# Patient Record
Sex: MALE | Race: WHITE | NOT HISPANIC OR LATINO | Employment: OTHER | URBAN - METROPOLITAN AREA
[De-identification: names, ages, dates, MRNs, and addresses within clinical notes are randomized per-mention and may not be internally consistent; named-entity substitution may affect disease eponyms.]

---

## 2017-01-09 ENCOUNTER — TRANSCRIBE ORDERS (OUTPATIENT)
Dept: ADMINISTRATIVE | Facility: HOSPITAL | Age: 47
End: 2017-01-09

## 2017-01-09 ENCOUNTER — APPOINTMENT (OUTPATIENT)
Dept: LAB | Facility: MEDICAL CENTER | Age: 47
End: 2017-01-09
Payer: MEDICARE

## 2017-01-09 DIAGNOSIS — Z79.891 ENCOUNTER FOR LONG-TERM METHADONE USE: Primary | ICD-10-CM

## 2017-01-09 DIAGNOSIS — Z79.891 ENCOUNTER FOR LONG-TERM METHADONE USE: ICD-10-CM

## 2017-01-09 LAB
ALBUMIN SERPL BCP-MCNC: 4.1 G/DL (ref 3.5–5)
ALP SERPL-CCNC: 129 U/L (ref 46–116)
ALT SERPL W P-5'-P-CCNC: 26 U/L (ref 12–78)
ANION GAP SERPL CALCULATED.3IONS-SCNC: 7 MMOL/L (ref 4–13)
AST SERPL W P-5'-P-CCNC: 11 U/L (ref 5–45)
BILIRUB SERPL-MCNC: 0.4 MG/DL (ref 0.2–1)
BUN SERPL-MCNC: 19 MG/DL (ref 5–25)
CALCIUM SERPL-MCNC: 9.8 MG/DL (ref 8.3–10.1)
CHLORIDE SERPL-SCNC: 100 MMOL/L (ref 100–108)
CO2 SERPL-SCNC: 34 MMOL/L (ref 21–32)
CREAT SERPL-MCNC: 1.44 MG/DL (ref 0.6–1.3)
ERYTHROCYTE [DISTWIDTH] IN BLOOD BY AUTOMATED COUNT: 13.1 % (ref 11.6–15.1)
GFR SERPL CREATININE-BSD FRML MDRD: 52.4 ML/MIN/1.73SQ M
GLUCOSE SERPL-MCNC: 240 MG/DL (ref 65–140)
HCT VFR BLD AUTO: 51.3 % (ref 36.5–49.3)
HGB BLD-MCNC: 16.5 G/DL (ref 12–17)
MCH RBC QN AUTO: 28.7 PG (ref 26.8–34.3)
MCHC RBC AUTO-ENTMCNC: 32.2 G/DL (ref 31.4–37.4)
MCV RBC AUTO: 89 FL (ref 82–98)
PLATELET # BLD AUTO: 223 THOUSANDS/UL (ref 149–390)
PMV BLD AUTO: 10.2 FL (ref 8.9–12.7)
POTASSIUM SERPL-SCNC: 4.2 MMOL/L (ref 3.5–5.3)
PROT SERPL-MCNC: 8.1 G/DL (ref 6.4–8.2)
RBC # BLD AUTO: 5.75 MILLION/UL (ref 3.88–5.62)
SODIUM SERPL-SCNC: 141 MMOL/L (ref 136–145)
WBC # BLD AUTO: 7.34 THOUSAND/UL (ref 4.31–10.16)

## 2017-01-09 PROCEDURE — 36415 COLL VENOUS BLD VENIPUNCTURE: CPT

## 2017-01-09 PROCEDURE — 85027 COMPLETE CBC AUTOMATED: CPT

## 2017-01-09 PROCEDURE — 80053 COMPREHEN METABOLIC PANEL: CPT

## 2017-05-18 ENCOUNTER — APPOINTMENT (EMERGENCY)
Dept: CT IMAGING | Facility: HOSPITAL | Age: 47
End: 2017-05-18
Payer: MEDICARE

## 2017-05-18 ENCOUNTER — HOSPITAL ENCOUNTER (EMERGENCY)
Facility: HOSPITAL | Age: 47
Discharge: HOME | End: 2017-05-18
Attending: EMERGENCY MEDICINE
Payer: MEDICARE

## 2017-05-18 VITALS
OXYGEN SATURATION: 95 % | RESPIRATION RATE: 18 BRPM | HEART RATE: 92 BPM | SYSTOLIC BLOOD PRESSURE: 137 MMHG | TEMPERATURE: 98.4 F | DIASTOLIC BLOOD PRESSURE: 81 MMHG | WEIGHT: 230 LBS

## 2017-05-18 DIAGNOSIS — S22.31XA CLOSED FRACTURE OF RIB OF RIGHT SIDE, INITIAL ENCOUNTER: Primary | ICD-10-CM

## 2017-05-18 DIAGNOSIS — J90 PLEURAL EFFUSION: ICD-10-CM

## 2017-05-18 LAB
ALBUMIN SERPL BCP-MCNC: 3.5 G/DL (ref 3.5–5)
ALP SERPL-CCNC: 124 U/L (ref 46–116)
ALT SERPL W P-5'-P-CCNC: 55 U/L (ref 12–78)
ANION GAP SERPL CALCULATED.3IONS-SCNC: 4 MMOL/L (ref 4–13)
AST SERPL W P-5'-P-CCNC: 29 U/L (ref 5–45)
BASOPHILS # BLD AUTO: 0.02 THOUSANDS/ΜL (ref 0–0.1)
BASOPHILS NFR BLD AUTO: 0 % (ref 0–1)
BILIRUB SERPL-MCNC: 0.6 MG/DL (ref 0.2–1)
BUN SERPL-MCNC: 13 MG/DL (ref 5–25)
CALCIUM SERPL-MCNC: 9.1 MG/DL (ref 8.3–10.1)
CHLORIDE SERPL-SCNC: 99 MMOL/L (ref 100–108)
CO2 SERPL-SCNC: 35 MMOL/L (ref 21–32)
CREAT SERPL-MCNC: 0.73 MG/DL (ref 0.6–1.3)
EOSINOPHIL # BLD AUTO: 0.15 THOUSAND/ΜL (ref 0–0.61)
EOSINOPHIL NFR BLD AUTO: 2 % (ref 0–6)
ERYTHROCYTE [DISTWIDTH] IN BLOOD BY AUTOMATED COUNT: 13.9 % (ref 11.6–15.1)
GFR SERPL CREATININE-BSD FRML MDRD: >60 ML/MIN/1.73SQ M
GLUCOSE SERPL-MCNC: 199 MG/DL (ref 65–140)
HCT VFR BLD AUTO: 44.9 % (ref 36.5–49.3)
HGB BLD-MCNC: 14.3 G/DL (ref 12–17)
LYMPHOCYTES # BLD AUTO: 1.84 THOUSANDS/ΜL (ref 0.6–4.47)
LYMPHOCYTES NFR BLD AUTO: 26 % (ref 14–44)
MCH RBC QN AUTO: 28.2 PG (ref 26.8–34.3)
MCHC RBC AUTO-ENTMCNC: 31.8 G/DL (ref 31.4–37.4)
MCV RBC AUTO: 89 FL (ref 82–98)
MONOCYTES # BLD AUTO: 0.67 THOUSAND/ΜL (ref 0.17–1.22)
MONOCYTES NFR BLD AUTO: 9 % (ref 4–12)
NEUTROPHILS # BLD AUTO: 4.46 THOUSANDS/ΜL (ref 1.85–7.62)
NEUTS SEG NFR BLD AUTO: 63 % (ref 43–75)
PLATELET # BLD AUTO: 218 THOUSANDS/UL (ref 149–390)
PMV BLD AUTO: 9.3 FL (ref 8.9–12.7)
POTASSIUM SERPL-SCNC: 4.2 MMOL/L (ref 3.5–5.3)
PROT SERPL-MCNC: 7.6 G/DL (ref 6.4–8.2)
RBC # BLD AUTO: 5.07 MILLION/UL (ref 3.88–5.62)
SODIUM SERPL-SCNC: 138 MMOL/L (ref 136–145)
WBC # BLD AUTO: 7.14 THOUSAND/UL (ref 4.31–10.16)

## 2017-05-18 PROCEDURE — 99284 EMERGENCY DEPT VISIT MOD MDM: CPT

## 2017-05-18 PROCEDURE — 96374 THER/PROPH/DIAG INJ IV PUSH: CPT

## 2017-05-18 PROCEDURE — 74177 CT ABD & PELVIS W/CONTRAST: CPT

## 2017-05-18 PROCEDURE — 71260 CT THORAX DX C+: CPT

## 2017-05-18 PROCEDURE — 96375 TX/PRO/DX INJ NEW DRUG ADDON: CPT

## 2017-05-18 PROCEDURE — 36415 COLL VENOUS BLD VENIPUNCTURE: CPT | Performed by: EMERGENCY MEDICINE

## 2017-05-18 PROCEDURE — 80053 COMPREHEN METABOLIC PANEL: CPT | Performed by: EMERGENCY MEDICINE

## 2017-05-18 PROCEDURE — 85025 COMPLETE CBC W/AUTO DIFF WBC: CPT | Performed by: EMERGENCY MEDICINE

## 2017-05-18 RX ORDER — IBUPROFEN 600 MG/1
600 TABLET ORAL EVERY 8 HOURS PRN
Qty: 15 TABLET | Refills: 0 | Status: SHIPPED | OUTPATIENT
Start: 2017-05-18 | End: 2017-06-17

## 2017-05-18 RX ORDER — ONDANSETRON 2 MG/ML
4 INJECTION INTRAMUSCULAR; INTRAVENOUS ONCE
Status: COMPLETED | OUTPATIENT
Start: 2017-05-18 | End: 2017-05-18

## 2017-05-18 RX ORDER — ZOLPIDEM TARTRATE 10 MG/1
10 TABLET ORAL
COMMUNITY
End: 2022-06-02

## 2017-05-18 RX ORDER — HYDROCODONE BITARTRATE AND ACETAMINOPHEN 5; 325 MG/1; MG/1
1 TABLET ORAL EVERY 6 HOURS PRN
Qty: 20 TABLET | Refills: 0 | Status: SHIPPED | OUTPATIENT
Start: 2017-05-18

## 2017-05-18 RX ORDER — OXYCODONE HYDROCHLORIDE AND ACETAMINOPHEN 5; 325 MG/1; MG/1
1 TABLET ORAL EVERY 6 HOURS PRN
Qty: 20 TABLET | Refills: 0 | Status: SHIPPED | OUTPATIENT
Start: 2017-05-18 | End: 2017-05-18

## 2017-05-18 RX ADMIN — IOHEXOL 100 ML: 350 INJECTION, SOLUTION INTRAVENOUS at 15:49

## 2017-05-18 RX ADMIN — ONDANSETRON 4 MG: 2 INJECTION INTRAMUSCULAR; INTRAVENOUS at 14:24

## 2017-05-18 RX ADMIN — HYDROMORPHONE HYDROCHLORIDE 1 MG: 1 INJECTION, SOLUTION INTRAMUSCULAR; INTRAVENOUS; SUBCUTANEOUS at 14:24

## 2018-01-12 NOTE — MISCELLANEOUS
Message  I called the patient with results of sleep study showing mild obstructive sleep apnea  With his hypercapnia, restrictive lung disease and daytime somnolence I believe he would benefit from a CPAP titration study and nocturnal use of at least CPAP and possibly BiPAP as that is what he has required in the past       Plan  DEONTE (obstructive sleep apnea)    · *Polysomnography, Sleep Study, CPAP/BiPAP titration; Status:Need Information -  Financial Authorization; Requested for:[id number];   there any other medical conditions or medications that would explain these      symptoms? : Yes  is the sleep disturbance affecting the patient's ability to function? : hypercapnia  History/Symptoms: : daytime somnolence  Study Only or Consult : Sleep Study Only Follow up with Referring Physician    Signatures   Electronically signed by : Ramos Burns DO;  Apr 11 2016 10:02AM EST                       (Author)

## 2019-09-17 ENCOUNTER — TRANSCRIBE ORDERS (OUTPATIENT)
Dept: ADMINISTRATIVE | Facility: HOSPITAL | Age: 49
End: 2019-09-17

## 2021-10-20 ENCOUNTER — APPOINTMENT (OUTPATIENT)
Dept: LAB | Facility: CLINIC | Age: 51
End: 2021-10-20
Payer: COMMERCIAL

## 2021-10-20 DIAGNOSIS — E78.5 HYPERLIPIDEMIA, UNSPECIFIED HYPERLIPIDEMIA TYPE: ICD-10-CM

## 2021-10-20 DIAGNOSIS — D64.9 ANEMIA, UNSPECIFIED TYPE: ICD-10-CM

## 2021-10-20 DIAGNOSIS — E55.9 AVITAMINOSIS D: ICD-10-CM

## 2021-10-20 LAB
25(OH)D3 SERPL-MCNC: 13.8 NG/ML (ref 30–100)
ALBUMIN SERPL BCP-MCNC: 4.1 G/DL (ref 3.5–5)
ALP SERPL-CCNC: 117 U/L (ref 46–116)
ALT SERPL W P-5'-P-CCNC: 34 U/L (ref 12–78)
ANION GAP SERPL CALCULATED.3IONS-SCNC: 2 MMOL/L (ref 4–13)
AST SERPL W P-5'-P-CCNC: 10 U/L (ref 5–45)
BILIRUB SERPL-MCNC: 0.77 MG/DL (ref 0.2–1)
BUN SERPL-MCNC: 20 MG/DL (ref 5–25)
CALCIUM SERPL-MCNC: 10.3 MG/DL (ref 8.3–10.1)
CHLORIDE SERPL-SCNC: 102 MMOL/L (ref 100–108)
CHOLEST SERPL-MCNC: 331 MG/DL (ref 50–200)
CO2 SERPL-SCNC: 32 MMOL/L (ref 21–32)
COLOGUARD RESULT REPORTABLE: NEGATIVE
CREAT SERPL-MCNC: 0.88 MG/DL (ref 0.6–1.3)
ERYTHROCYTE [DISTWIDTH] IN BLOOD BY AUTOMATED COUNT: 13 % (ref 11.6–15.1)
EST. AVERAGE GLUCOSE BLD GHB EST-MCNC: 217 MG/DL
GFR SERPL CREATININE-BSD FRML MDRD: 98 ML/MIN/1.73SQ M
GLUCOSE P FAST SERPL-MCNC: 172 MG/DL (ref 65–99)
HBA1C MFR BLD: 9.2 %
HCT VFR BLD AUTO: 53.4 % (ref 36.5–49.3)
HDLC SERPL-MCNC: 60 MG/DL
HGB BLD-MCNC: 16.6 G/DL (ref 12–17)
LDLC SERPL CALC-MCNC: 229 MG/DL (ref 0–100)
MCH RBC QN AUTO: 28.1 PG (ref 26.8–34.3)
MCHC RBC AUTO-ENTMCNC: 31.1 G/DL (ref 31.4–37.4)
MCV RBC AUTO: 90 FL (ref 82–98)
NONHDLC SERPL-MCNC: 271 MG/DL
PLATELET # BLD AUTO: 235 THOUSANDS/UL (ref 149–390)
PMV BLD AUTO: 9.9 FL (ref 8.9–12.7)
POTASSIUM SERPL-SCNC: 4.1 MMOL/L (ref 3.5–5.3)
PROT SERPL-MCNC: 8 G/DL (ref 6.4–8.2)
PSA SERPL-MCNC: 2.2 NG/ML (ref 0–4)
RBC # BLD AUTO: 5.91 MILLION/UL (ref 3.88–5.62)
SODIUM SERPL-SCNC: 136 MMOL/L (ref 136–145)
TRIGL SERPL-MCNC: 209 MG/DL
WBC # BLD AUTO: 5.79 THOUSAND/UL (ref 4.31–10.16)

## 2021-10-20 PROCEDURE — 84153 ASSAY OF PSA TOTAL: CPT

## 2021-10-20 PROCEDURE — 80061 LIPID PANEL: CPT

## 2021-10-20 PROCEDURE — 83036 HEMOGLOBIN GLYCOSYLATED A1C: CPT

## 2021-10-20 PROCEDURE — 82306 VITAMIN D 25 HYDROXY: CPT

## 2021-10-20 PROCEDURE — 80053 COMPREHEN METABOLIC PANEL: CPT

## 2021-10-20 PROCEDURE — 85027 COMPLETE CBC AUTOMATED: CPT

## 2021-10-20 PROCEDURE — 36415 COLL VENOUS BLD VENIPUNCTURE: CPT

## 2022-06-02 ENCOUNTER — TELEPHONE (OUTPATIENT)
Dept: FAMILY MEDICINE CLINIC | Facility: CLINIC | Age: 52
End: 2022-06-02

## 2022-06-02 ENCOUNTER — APPOINTMENT (OUTPATIENT)
Dept: LAB | Facility: CLINIC | Age: 52
End: 2022-06-02
Payer: COMMERCIAL

## 2022-06-02 ENCOUNTER — OFFICE VISIT (OUTPATIENT)
Dept: FAMILY MEDICINE CLINIC | Facility: CLINIC | Age: 52
End: 2022-06-02
Payer: COMMERCIAL

## 2022-06-02 VITALS
BODY MASS INDEX: 31.42 KG/M2 | OXYGEN SATURATION: 94 % | HEIGHT: 70 IN | DIASTOLIC BLOOD PRESSURE: 72 MMHG | TEMPERATURE: 97.3 F | HEART RATE: 86 BPM | SYSTOLIC BLOOD PRESSURE: 110 MMHG | WEIGHT: 219.5 LBS

## 2022-06-02 DIAGNOSIS — Z00.00 MEDICARE ANNUAL WELLNESS VISIT, SUBSEQUENT: Primary | ICD-10-CM

## 2022-06-02 DIAGNOSIS — J96.10 CHRONIC RESPIRATORY FAILURE, UNSPECIFIED WHETHER WITH HYPOXIA OR HYPERCAPNIA (HCC): ICD-10-CM

## 2022-06-02 DIAGNOSIS — E78.00 HYPERCHOLESTEREMIA: ICD-10-CM

## 2022-06-02 DIAGNOSIS — G83.9 SPASTIC PARESIS (HCC): ICD-10-CM

## 2022-06-02 DIAGNOSIS — E11.65 CONTROLLED TYPE 2 DIABETES MELLITUS WITH HYPERGLYCEMIA, WITHOUT LONG-TERM CURRENT USE OF INSULIN (HCC): ICD-10-CM

## 2022-06-02 DIAGNOSIS — F11.20 CONTINUOUS OPIOID DEPENDENCE (HCC): ICD-10-CM

## 2022-06-02 DIAGNOSIS — E11.9 CONTROLLED TYPE 2 DIABETES MELLITUS WITHOUT COMPLICATION, WITHOUT LONG-TERM CURRENT USE OF INSULIN (HCC): ICD-10-CM

## 2022-06-02 DIAGNOSIS — S24.109S: ICD-10-CM

## 2022-06-02 DIAGNOSIS — G47.33 OSA (OBSTRUCTIVE SLEEP APNEA): ICD-10-CM

## 2022-06-02 DIAGNOSIS — M54.12 CERVICAL RADICULOPATHY: ICD-10-CM

## 2022-06-02 PROBLEM — J96.11 CHRONIC RESPIRATORY FAILURE WITH HYPOXIA (HCC): Status: ACTIVE | Noted: 2022-06-02

## 2022-06-02 PROBLEM — Z97.8 PRESENCE OF INTRATHECAL BACLOFEN PUMP: Status: ACTIVE | Noted: 2020-09-02

## 2022-06-02 PROBLEM — S24.109A: Status: ACTIVE | Noted: 2020-09-02

## 2022-06-02 LAB
ALBUMIN SERPL BCP-MCNC: 4 G/DL (ref 3.5–5)
ALP SERPL-CCNC: 114 U/L (ref 46–116)
ALT SERPL W P-5'-P-CCNC: 37 U/L (ref 12–78)
ANION GAP SERPL CALCULATED.3IONS-SCNC: 1 MMOL/L (ref 4–13)
AST SERPL W P-5'-P-CCNC: 20 U/L (ref 5–45)
BILIRUB SERPL-MCNC: 0.57 MG/DL (ref 0.2–1)
BUN SERPL-MCNC: 16 MG/DL (ref 5–25)
CALCIUM SERPL-MCNC: 10.2 MG/DL (ref 8.3–10.1)
CHLORIDE SERPL-SCNC: 101 MMOL/L (ref 100–108)
CHOLEST SERPL-MCNC: 298 MG/DL
CO2 SERPL-SCNC: 34 MMOL/L (ref 21–32)
CREAT SERPL-MCNC: 0.84 MG/DL (ref 0.6–1.3)
ERYTHROCYTE [DISTWIDTH] IN BLOOD BY AUTOMATED COUNT: 13.1 % (ref 11.6–15.1)
GFR SERPL CREATININE-BSD FRML MDRD: 99 ML/MIN/1.73SQ M
GLUCOSE P FAST SERPL-MCNC: 161 MG/DL (ref 65–99)
HCT VFR BLD AUTO: 52.9 % (ref 36.5–49.3)
HDLC SERPL-MCNC: 57 MG/DL
HGB BLD-MCNC: 16.6 G/DL (ref 12–17)
LDLC SERPL CALC-MCNC: 202 MG/DL (ref 0–100)
MCH RBC QN AUTO: 28.8 PG (ref 26.8–34.3)
MCHC RBC AUTO-ENTMCNC: 31.4 G/DL (ref 31.4–37.4)
MCV RBC AUTO: 92 FL (ref 82–98)
PLATELET # BLD AUTO: 222 THOUSANDS/UL (ref 149–390)
PMV BLD AUTO: 9.9 FL (ref 8.9–12.7)
POTASSIUM SERPL-SCNC: 4.4 MMOL/L (ref 3.5–5.3)
PROT SERPL-MCNC: 8 G/DL (ref 6.4–8.2)
RBC # BLD AUTO: 5.77 MILLION/UL (ref 3.88–5.62)
SL AMB POCT HEMOGLOBIN AIC: 8.3 (ref ?–6.5)
SODIUM SERPL-SCNC: 136 MMOL/L (ref 136–145)
TRIGL SERPL-MCNC: 194 MG/DL
TSH SERPL DL<=0.05 MIU/L-ACNC: 3.02 UIU/ML (ref 0.45–4.5)
WBC # BLD AUTO: 5.97 THOUSAND/UL (ref 4.31–10.16)

## 2022-06-02 PROCEDURE — 36415 COLL VENOUS BLD VENIPUNCTURE: CPT

## 2022-06-02 PROCEDURE — 80061 LIPID PANEL: CPT

## 2022-06-02 PROCEDURE — 84443 ASSAY THYROID STIM HORMONE: CPT

## 2022-06-02 PROCEDURE — 85027 COMPLETE CBC AUTOMATED: CPT

## 2022-06-02 PROCEDURE — 83036 HEMOGLOBIN GLYCOSYLATED A1C: CPT | Performed by: FAMILY MEDICINE

## 2022-06-02 PROCEDURE — 3725F SCREEN DEPRESSION PERFORMED: CPT | Performed by: FAMILY MEDICINE

## 2022-06-02 PROCEDURE — G0439 PPPS, SUBSEQ VISIT: HCPCS | Performed by: FAMILY MEDICINE

## 2022-06-02 PROCEDURE — 1003F LEVEL OF ACTIVITY ASSESS: CPT | Performed by: FAMILY MEDICINE

## 2022-06-02 PROCEDURE — 99204 OFFICE O/P NEW MOD 45 MIN: CPT | Performed by: FAMILY MEDICINE

## 2022-06-02 PROCEDURE — 80053 COMPREHEN METABOLIC PANEL: CPT

## 2022-06-02 PROCEDURE — 3052F HG A1C>EQUAL 8.0%<EQUAL 9.0%: CPT | Performed by: FAMILY MEDICINE

## 2022-06-02 RX ORDER — CANAGLIFLOZIN AND METFORMIN HYDROCHLORIDE 50; 1000 MG/1; MG/1
1 TABLET, FILM COATED ORAL DAILY
COMMUNITY
Start: 2016-03-03 | End: 2022-06-02

## 2022-06-02 RX ORDER — PREGABALIN 25 MG/1
25 CAPSULE ORAL 3 TIMES DAILY
Qty: 90 CAPSULE | Refills: 0 | Status: SHIPPED | OUTPATIENT
Start: 2022-06-02 | End: 2022-07-06

## 2022-06-02 RX ORDER — BACLOFEN 500 UG/ML
INJECTION, SOLUTION INTRATHECAL
COMMUNITY

## 2022-06-02 RX ORDER — ATORVASTATIN CALCIUM 40 MG/1
TABLET, FILM COATED ORAL
COMMUNITY
Start: 2022-05-26

## 2022-06-02 RX ORDER — ZOLPIDEM TARTRATE 10 MG/1
1 TABLET ORAL
COMMUNITY
End: 2022-07-25 | Stop reason: SDUPTHER

## 2022-06-02 NOTE — ASSESSMENT & PLAN NOTE
Lab Results   Component Value Date    HGBA1C 8 3 (A) 06/02/2022   Uncontrolled  Currently on metformin 500 once daily  Increase gradually to 1000mg BID  Obtain labs  Review vaccines, foot/eye exam next visit   F/u 3 months

## 2022-06-02 NOTE — PATIENT INSTRUCTIONS
Medicare Preventive Visit Patient Instructions  Thank you for completing your Welcome to Medicare Visit or Medicare Annual Wellness Visit today  Your next wellness visit will be due in one year (6/3/2023)  The screening/preventive services that you may require over the next 5-10 years are detailed below  Some tests may not apply to you based off risk factors and/or age  Screening tests ordered at today's visit but not completed yet may show as past due  Also, please note that scanned in results may not display below  Preventive Screenings:  Service Recommendations Previous Testing/Comments   Colorectal Cancer Screening  · Colonoscopy    · Fecal Occult Blood Test (FOBT)/Fecal Immunochemical Test (FIT)  · Fecal DNA/Cologuard Test  · Flexible Sigmoidoscopy Age: 54-65 years old   Colonoscopy: every 10 years (May be performed more frequently if at higher risk)  OR  FOBT/FIT: every 1 year  OR  Cologuard: every 3 years  OR  Sigmoidoscopy: every 5 years  Screening may be recommended earlier than age 48 if at higher risk for colorectal cancer  Also, an individualized decision between you and your healthcare provider will decide whether screening between the ages of 74-80 would be appropriate   Colonoscopy: 10/20/2021  FOBT/FIT: Not on file  Cologuard: Not on file  Sigmoidoscopy: Not on file    Screening Current     Prostate Cancer Screening Individualized decision between patient and health care provider in men between ages of 53-78   Medicare will cover every 12 months beginning on the day after your 50th birthday PSA: 2 2 ng/mL     Screening Current     Hepatitis C Screening Once for adults born between 1945 and 1965  More frequently in patients at high risk for Hepatitis C Hep C Antibody: Not on file        Diabetes Screening 1-2 times per year if you're at risk for diabetes or have pre-diabetes Fasting glucose: 172 mg/dL   A1C: 9 2 %    Screening Current   Cholesterol Screening Once every 5 years if you don't have a lipid disorder  May order more often based on risk factors  Lipid panel: 10/20/2021    Screening Not Indicated  History Lipid Disorder      Other Preventive Screenings Covered by Medicare:  1  Abdominal Aortic Aneurysm (AAA) Screening: covered once if your at risk  You're considered to be at risk if you have a family history of AAA or a male between the age of 73-68 who smoking at least 100 cigarettes in your lifetime  2  Lung Cancer Screening: covers low dose CT scan once per year if you meet all of the following conditions: (1) Age 50-69; (2) No signs or symptoms of lung cancer; (3) Current smoker or have quit smoking within the last 15 years; (4) You have a tobacco smoking history of at least 30 pack years (packs per day x number of years you smoked); (5) You get a written order from a healthcare provider  3  Glaucoma Screening: covered annually if you're considered high risk: (1) You have diabetes OR (2) Family history of glaucoma OR (3)  aged 48 and older OR (3)  American aged 72 and older  3  Osteoporosis Screening: covered every 2 years if you meet one of the following conditions: (1) Have a vertebral abnormality; (2) On glucocorticoid therapy for more than 3 months; (3) Have primary hyperparathyroidism; (4) On osteoporosis medications and need to assess response to drug therapy  5  HIV Screening: covered annually if you're between the age of 12-76  Also covered annually if you are younger than 13 and older than 72 with risk factors for HIV infection  For pregnant patients, it is covered up to 3 times per pregnancy      Immunizations:  Immunization Recommendations   Influenza Vaccine Annual influenza vaccination during flu season is recommended for all persons aged >= 6 months who do not have contraindications   Pneumococcal Vaccine (Prevnar and Pneumovax)  * Prevnar = PCV13  * Pneumovax = PPSV23 Adults 25-60 years old: 1-3 doses may be recommended based on certain risk factors  Adults 72 years old: Prevnar (PCV13) vaccine recommended followed by Pneumovax (PPSV23) vaccine  If already received PPSV23 since turning 65, then PCV13 recommended at least one year after PPSV23 dose  Hepatitis B Vaccine 3 dose series if at intermediate or high risk (ex: diabetes, end stage renal disease, liver disease)   Tetanus (Td) Vaccine - COST NOT COVERED BY MEDICARE PART B Following completion of primary series, a booster dose should be given every 10 years to maintain immunity against tetanus  Td may also be given as tetanus wound prophylaxis  Tdap Vaccine - COST NOT COVERED BY MEDICARE PART B Recommended at least once for all adults  For pregnant patients, recommended with each pregnancy  Shingles Vaccine (Shingrix) - COST NOT COVERED BY MEDICARE PART B  2 shot series recommended in those aged 48 and above     Health Maintenance Due:      Topic Date Due    Hepatitis C Screening  Never done    HIV Screening  Never done    Colorectal Cancer Screening  Never done     Immunizations Due:      Topic Date Due    COVID-19 Vaccine (1) Never done    DTaP,Tdap,and Td Vaccines (1 - Tdap) Never done    Influenza Vaccine (Season Ended) 09/01/2022     Advance Directives   What are advance directives? Advance directives are legal documents that state your wishes and plans for medical care  These plans are made ahead of time in case you lose your ability to make decisions for yourself  Advance directives can apply to any medical decision, such as the treatments you want, and if you want to donate organs  What are the types of advance directives? There are many types of advance directives, and each state has rules about how to use them  You may choose a combination of any of the following:  · Living will: This is a written record of the treatment you want  You can also choose which treatments you do not want, which to limit, and which to stop at a certain time   This includes surgery, medicine, IV fluid, and tube feedings  · Durable power of  for healthcare [place] [facility]): This is a written record that states who you want to make healthcare choices for you when you are unable to make them for yourself  This person, called a proxy, is usually a family member or a friend  You may choose more than 1 proxy  · Do not resuscitate (DNR) order:  A DNR order is used in case your heart stops beating or you stop breathing  It is a request not to have certain forms of treatment, such as CPR  A DNR order may be included in other types of advance directives  · Medical directive: This covers the care that you want if you are in a coma, near death, or unable to make decisions for yourself  You can list the treatments you want for each condition  Treatment may include pain medicine, surgery, blood transfusions, dialysis, IV or tube feedings, and a ventilator (breathing machine)  · Values history: This document has questions about your views, beliefs, and how you feel and think about life  This information can help others choose the care that you would choose  Why are advance directives important? An advance directive helps you control your care  Although spoken wishes may be used, it is better to have your wishes written down  Spoken wishes can be misunderstood, or not followed  Treatments may be given even if you do not want them  An advance directive may make it easier for your family to make difficult choices about your care  Weight Management   Why it is important to manage your weight:  Being overweight increases your risk of health conditions such as heart disease, high blood pressure, type 2 diabetes, and certain types of cancer  It can also increase your risk for osteoarthritis, sleep apnea, and other respiratory problems  Aim for a slow, steady weight loss  Even a small amount of weight loss can lower your risk of health problems    How to lose weight safely:  A safe and healthy way to lose weight is to eat fewer calories and get regular exercise  You can lose up about 1 pound a week by decreasing the number of calories you eat by 500 calories each day  Healthy meal plan for weight management:  A healthy meal plan includes a variety of foods, contains fewer calories, and helps you stay healthy  A healthy meal plan includes the following:  · Eat whole-grain foods more often  A healthy meal plan should contain fiber  Fiber is the part of grains, fruits, and vegetables that is not broken down by your body  Whole-grain foods are healthy and provide extra fiber in your diet  Some examples of whole-grain foods are whole-wheat breads and pastas, oatmeal, brown rice, and bulgur  · Eat a variety of vegetables every day  Include dark, leafy greens such as spinach, kale, jay greens, and mustard greens  Eat yellow and orange vegetables such as carrots, sweet potatoes, and winter squash  · Eat a variety of fruits every day  Choose fresh or canned fruit (canned in its own juice or light syrup) instead of juice  Fruit juice has very little or no fiber  · Eat low-fat dairy foods  Drink fat-free (skim) milk or 1% milk  Eat fat-free yogurt and low-fat cottage cheese  Try low-fat cheeses such as mozzarella and other reduced-fat cheeses  · Choose meat and other protein foods that are low in fat  Choose beans or other legumes such as split peas or lentils  Choose fish, skinless poultry (chicken or turkey), or lean cuts of red meat (beef or pork)  Before you cook meat or poultry, cut off any visible fat  · Use less fat and oil  Try baking foods instead of frying them  Add less fat, such as margarine, sour cream, regular salad dressing and mayonnaise to foods  Eat fewer high-fat foods  Some examples of high-fat foods include french fries, doughnuts, ice cream, and cakes  · Eat fewer sweets  Limit foods and drinks that are high in sugar  This includes candy, cookies, regular soda, and sweetened drinks    Exercise: Exercise at least 30 minutes per day on most days of the week  Some examples of exercise include walking, biking, dancing, and swimming  You can also fit in more physical activity by taking the stairs instead of the elevator or parking farther away from stores  Ask your healthcare provider about the best exercise plan for you  Narcotic (Opioid) Safety    Use narcotics safely:  · Take prescribed narcotics exactly as directed  · Do not give narcotics to others or take narcotics that belong to someone else  · Do not mix narcotics without medicines or alcohol  · Do not drive or operate heavy machinery after you take the narcotic  · Monitor for side effects and notify your healthcare provider if you experienced side effects such as nausea, sleepiness, itching, or trouble thinking clearly  Manage constipation:    Constipation is the most common side effect of narcotic medicine  Constipation is when you have hard, dry bowel movements, or you go longer than usual between bowel movements  Tell your healthcare provider about all changes in your bowel movements while you are taking narcotics  He or she may recommend laxative medicine to help you have a bowel movement  He or she may also change the kind of narcotic you are taking, or change when you take it  The following are more ways you can prevent or relieve constipation:    · Drink liquids as directed  You may need to drink extra liquids to help soften and move your bowels  Ask how much liquid to drink each day and which liquids are best for you  · Eat high-fiber foods  This may help decrease constipation by adding bulk to your bowel movements  High-fiber foods include fruits, vegetables, whole-grain breads and cereals, and beans  Your healthcare provider or dietitian can help you create a high-fiber meal plan  Your provider may also recommend a fiber supplement if you cannot get enough fiber from food  · Exercise regularly    Regular physical activity can help stimulate your intestines  Walking is a good exercise to prevent or relieve constipation  Ask which exercises are best for you  · Schedule a time each day to have a bowel movement  This may help train your body to have regular bowel movements  Bend forward while you are on the toilet to help move the bowel movement out  Sit on the toilet for at least 10 minutes, even if you do not have a bowel movement  Store narcotics safely:   · Store narcotics where others cannot easily get them  Keep them in a locked cabinet or secure area  Do not  keep them in a purse or other bag you carry with you  A person may be looking for something else and find the narcotics  · Make sure narcotics are stored out of the reach of children  A child can easily overdose on narcotics  Narcotics may look like candy to a small child  The best way to dispose of narcotics: The laws vary by country and area  In the [place], the best way is to return the narcotics through a take-back program  This program is offered by the [employer])  The following are options for using the program:  · Take the narcotics to a VLAD collection site  The site is often a law enforcement center  Call your local law enforcement center for scheduled take-back days in your area  You will be given information on where to go if the collection site is in a different location  · Take the narcotics to an approved pharmacy or hospital   A pharmacy or hospital may be set up as a collection site  You will need to ask if it is a VLAD collection site if you were not directed there  A pharmacy or doctor's office may not be able to take back narcotics unless it is a VLAD site  · Use a mail-back system  This means you are given containers to put the narcotics into  You will then mail them in the containers  · Use a take-back drop box  This is a place to leave the narcotics at any time   People and animals will not be able to get into the box  Your local law enforcement agency can tell you where to find a drop box in your area  Other ways to manage pain:   · Ask your healthcare provider about non-narcotic medicines to control pain  Nonprescription medicines include NSAIDs (such as ibuprofen) and acetaminophen  Prescription medicines include muscle relaxers, antidepressants, and steroids  · Pain may be managed without any medicines  Some ways to relieve pain include massage, aromatherapy, or meditation  Physical or occupational therapy may also help  For more information:   · Drug Enforcement Administration  [facility] [address]  Phone: 4- 248 - [phone]  Web Address: [facility]/drug_disposal/    · [address]  and Drug Administration  [facility] , [address] [facility]  Phone: 5- 919 - [phone]  Web Address: http://[employer]/     © Copyright [employer] 2018 Information is for End User's use only and may not be sold, redistributed or otherwise used for commercial purposes   All illustrations and images included in CareNotes® are the copyrighted property of A D A M , Inc  or [employer]

## 2022-06-02 NOTE — ASSESSMENT & PLAN NOTE
Mostly compliant with CPAP  Does not follow up with specialist, no recent sleep exam  Will review settings next visit

## 2022-06-02 NOTE — PROGRESS NOTES
Brayden Thorpe Page 1970 male MRN: [id number]  [address]    Visit to Establish Care: Family Medicine    Assessment/Plan     Type 2 diabetes mellitus with hyperglycemia, without long-term current use of insulin ([facility] )    Lab Results   Component Value Date    HGBA1C 8 3 (A) 06/02/2022   Uncontrolled  Currently on metformin 500 once daily  Increase gradually to 1000mg BID  Obtain labs  Review vaccines, foot/eye exam next visit   F/u 3 months     Cervical radiculopathy  Abnormal biceps reflexes with induced fasciculations on exam, associated with paresthesias and significant pain  Obtain MRI to assess for progression since last imaging 2014 to to determine need for neuro-intervention  Trial of Lyrica 25mg TID for neuropathy   Continue OTC medication, CBD, and prn Norco for pain  Continue HEP     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[address] )  Managed by pain medicine with baclofen pump    DEONTE (obstructive sleep apnea)  Mostly compliant with CPAP  Does not follow up with specialist, no recent sleep exam  Will review settings next visit     Depression Screening and Follow-up Plan: Patient was screened for depression during today's encounter  They screened negative with a PHQ-2 score of 0  Leverne Dollar was seen today for medicare wellness visit and back pain  Diagnoses and all orders for this visit:    Medicare annual wellness visit, subsequent    Controlled type 2 diabetes mellitus with hyperglycemia, without long-term current use of insulin (HCC)  -     POCT hemoglobin A1c  -     metFORMIN (GLUCOPHAGE) 1000 MG tablet; Take 1 tablet (1,000 mg total) by mouth 2 (two) times a day with meals  -     Lipid Panel with Direct LDL reflex; Future  -     Comprehensive metabolic panel; Future  -     CBC; Future  -     Microalbumin / creatinine urine ratio; Future  -     UA (URINE) with reflex to Scope; Future  -     TSH, 3rd generation with Free T4 reflex;  Future    Cervical radiculopathy  -     pregabalin (LYRICA) 25 mg capsule; Take 1 capsule (25 mg total) by mouth 3 (three) times a day  -     MRI cervical spine wo contrast; Future    Continuous opioid dependence (HCC)    Chronic respiratory failure, unspecified whether with hypoxia or hypercapnia ([address] )    Thoracic cord injury without spinal bone injury, sequela (HCC)    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HCC)    BMI 31 0-31 9,adult    DEONTE (obstructive sleep apnea)    In addition to the above, the patient was counseled on general preventative health care subjects, including but not limited to:  - Nutrition, healthy weight, aerobic and weight-bearing exercise  - Mental health, social support, and self care  - Advised of the importance of dental hygiene and routine dental visits   - Patient made aware of  services at the office  Future Appointments   Date Time Provider Maricruz Hidalgo   9/13/2022 11:00 AM Shanta Jimenez MD [facility]         Shanta Jimenez MD  [address]  6/2/2022      Please be aware that this note contains text that was dictated and there may be errors pertaining to "sound-alike" words during the dictation process  SUBJECTIVE    HPI:  Anatoly Willis is a 45 y o  male who presented to establish care with this family medicine practice  He had diabetes  He is currently taking metformin 500mg at night  He is not taking other medications listed  He has Jardiance at home but is not taking it  His last labs were in October 2021  Insomnia- takes Ambien as needed  Cholesterol - takes Lipitor, no concerns  Chronic pain:  - 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  He states this caused nerve damage and he has spasticity and chronic pain from this  He has been seeing a pain management specialist and currently is managed for his spasticity with a baclofen pump  He denies having had surgical interventions, injections (steroid, analgesia) for his pain   Takes hydrocodone-acetaminophen 5-325 as needed for pain, he does not take this every day  He recalls previously being on Lyrica (unsure why it was stopped) and another medication he can't remember the name that made him loopy  Pain is primarily located in his arms, hands, and lower back  He gets radiculopathy in both arms, and has constant numbness/tingling  Gets shooting electric pain in his arms  Tried CBD with mixed benefits  Got medical marijuana card but it   Pain level: Average day 5/10  At worst 8/10  At best 3 5/10  Varies sometimes without trigger, and does change with weather  Not necessarily worse with activity or time of day  Pain does interfere with QOL and ADL  Review of Systems   Constitutional: Negative for chills, fatigue, fever and unexpected weight change  HENT: Negative for ear pain and sore throat  Eyes: Negative for pain and visual disturbance  Respiratory: Negative for cough and shortness of breath  Cardiovascular: Negative for chest pain and palpitations  Gastrointestinal: Negative for abdominal pain and vomiting  Genitourinary: Negative for dysuria and hematuria  Musculoskeletal: Positive for arthralgias, back pain and gait problem  Skin: Negative for color change and rash  Neurological: Positive for tremors and numbness  Negative for syncope, light-headedness and headaches  Psychiatric/Behavioral: Negative for sleep disturbance  All other systems reviewed and are negative        Historical Information   Past Medical History:   Diagnosis Date    Diabetes mellitus ([facility] [address] )      Past Surgical History:   Procedure Laterality Date    BACK SURGERY      KNEE SURGERY Left     VENTRAL HERNIA REPAIR       Family History   Problem Relation Age of Onset    Diabetes Mother     Lung cancer Father     Skin cancer Father     Prostate cancer Father     Colon cancer Neg Hx     Hypertension Neg Hx     Hyperlipidemia Neg Hx     Thyroid disease Neg Hx      Social History     Socioeconomic History    Marital status:      Spouse name: Not on file    Number of children: Not on file    Years of education: Not on file    Highest education level: Not on file   Occupational History    Not on file   Tobacco Use    Smoking status: Never Smoker    Smokeless tobacco: Never Used   Vaping Use    Vaping Use: Never used   Substance and Sexual Activity    Alcohol use: No    Drug use: No    Sexual activity: Not on file   Other Topics Concern    Not on file   Social History Narrative    Not on file     Social Determinants of Health     Financial Resource Strain: Not on file   Food Insecurity: Not on file   Transportation Needs: Not on file   Physical Activity: Not on file   Stress: Not on file   Social Connections: Not on file   Intimate Partner Violence: Not on file   Housing Stability: Not on file     ?       Medications:      Current Outpatient Medications:     atorvastatin (LIPITOR) 40 mg tablet, , Disp: , Rfl:     baclofen (LIORESAL) 10 MG/20ML, by Intrathecal route, Disp: , Rfl:     BACLOFEN IT, by Intrathecal route, Disp: , Rfl:     HYDROcodone-acetaminophen (NORCO) 5-325 mg per tablet, Take 1 tablet by mouth every 6 (six) hours as needed for pain Max Daily Amount: 4 tablets, Disp: 20 tablet, Rfl: 0    metFORMIN (GLUCOPHAGE) 1000 MG tablet, Take 1 tablet (1,000 mg total) by mouth 2 (two) times a day with meals, Disp: 180 tablet, Rfl: 1    pregabalin (LYRICA) 25 mg capsule, Take 1 capsule (25 mg total) by mouth 3 (three) times a day, Disp: 90 capsule, Rfl: 0    sertraline (ZOLOFT) 50 mg tablet, Take 50 mg by mouth daily, Disp: , Rfl:     zolpidem (AMBIEN) 10 mg tablet, Take 1 tablet by mouth, Disp: , Rfl:     ibuprofen (MOTRIN) 600 mg tablet, Take 1 tablet by mouth every 8 (eight) hours as needed for moderate pain (pain) for up to 30 days, Disp: 15 tablet, Rfl: 0    Allergies   Allergen Reactions    Gadolinium Derivatives Itching     Media Ready-Box       Most Recent Immunizations Administered Date(s) Administered    Pneumococcal Polysaccharide PPV23 1970       OBJECTIVE  Vitals:   Vitals:    06/02/22 0952   BP: 110/72   Pulse: 86   Temp: (!) 97 3 °F (36 3 °C)   SpO2: 94%   Weight: 99 6 kg (219 lb 8 oz)   Height: 5' 10" (1 778 m)     Wt Readings from Last 3 Encounters:   06/02/22 99 6 kg (219 lb 8 oz)   05/18/17 104 kg (230 lb)   03/03/16 102 kg (224 lb)     Body mass index is 31 49 kg/m²  BP Readings from Last 3 Encounters:   06/02/22 110/72   05/18/17 137/81   03/03/16 110/70     No LMP for male patient  Physical Exam  Vitals and nursing note reviewed  Constitutional:       General: He is not in acute distress  Appearance: He is well-developed  He is obese  He is not ill-appearing, toxic-appearing or diaphoretic  HENT:      Head: Normocephalic and atraumatic  Right Ear: Tympanic membrane, ear canal and external ear normal       Left Ear: Tympanic membrane, ear canal and external ear normal       Nose: Nose normal       Mouth/Throat:      Pharynx: Uvula midline  Tonsils: No tonsillar exudate  Eyes:      Conjunctiva/sclera: Conjunctivae normal       Pupils: Pupils are equal, round, and reactive to light  Neck:      Thyroid: No thyromegaly  Cardiovascular:      Rate and Rhythm: Normal rate and regular rhythm  Heart sounds: Normal heart sounds  No murmur heard  Pulmonary:      Effort: Pulmonary effort is normal  No respiratory distress  Breath sounds: Decreased breath sounds present  No wheezing, rhonchi or rales  Abdominal:      General: Abdomen is protuberant  Bowel sounds are normal  There is no distension  Palpations: Abdomen is soft  Abdomen is not rigid  Tenderness: There is no abdominal tenderness  There is no guarding or rebound  Comments: Baclofen in the abdominal cavity as marked    Musculoskeletal:      Cervical back: Normal range of motion and neck supple  Lymphadenopathy:      Cervical: No cervical adenopathy  Skin:     General: Skin is warm and dry  Neurological:      Mental Status: He is alert and oriented to person, place, and time  Sensory: No sensory deficit  Motor: No abnormal muscle tone  Gait: Gait abnormal and tandem walk abnormal       Deep Tendon Reflexes: Reflexes abnormal       Reflex Scores:       Bicep reflexes are 1+ on the right side and 1+ on the left side  Patellar reflexes are 3+ on the right side and 3+ on the left side  Comments: Abnormal gait  Some spasticity noted at rest during exam  Fasciculations induced by upper extremity reflex exam for >30s         Lab:  I have personally reviewed all pertinent results  Office Visit on 06/02/2022   Component Date Value Ref Range Status    Hemoglobin A1C 06/02/2022 8 3 (A) 6 5 Final     Imaging:  I have personally reviewed all pertinent results

## 2022-06-02 NOTE — ASSESSMENT & PLAN NOTE
Abnormal biceps reflexes with induced fasciculations on exam, associated with paresthesias and significant pain  Obtain MRI to assess for progression since last imaging 2014 to to determine need for neuro-intervention  Trial of Lyrica 25mg TID for neuropathy   Continue OTC medication, CBD, and prn Norco for pain  Continue HEP

## 2022-06-02 NOTE — TELEPHONE ENCOUNTER
Matt from [facility] care team called stating that patient has to call the manufacturers to see if baclofen pump is compatible with the MRI equipment

## 2022-06-02 NOTE — PROGRESS NOTES
Assessment and Plan:   BMI Counseling: Body mass index is 31 49 kg/m²  The BMI is above normal  Nutrition recommendations include decreasing portion sizes, encouraging healthy choices of fruits and vegetables and limiting drinks that contain sugar  Exercise recommendations include moderate physical activity 150 minutes/week, exercising 3-5 times per week and strength training exercises  Patient referred to PCP  Rationale for BMI follow-up plan is due to patient being overweight or obese  Depression Screening and Follow-up Plan: Patient was screened for depression during today's encounter  They screened negative with a PHQ-2 score of 0  Preventive health issues were discussed with patient, and age appropriate screening tests were ordered as noted in patient's After Visit Summary  Personalized health advice and appropriate referrals for health education or preventive services given if needed, as noted in patient's After Visit Summary       History of Present Illness:     Patient presents for Medicare Annual Wellness visit    Patient Care Team:  Aminah Butler MD as PCP - General (Family Medicine)  Avtar Jerez DO     Problem List:     Patient Active Problem List   Diagnosis    Type 2 diabetes mellitus with hyperglycemia, without long-term current use of insulin ([address] )    Thoracic cord injury without spinal bone injury ([address] )    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[address] )    DEONTE (obstructive sleep apnea)    Cervical radiculopathy    Chronic pain disorder    Continuous opioid dependence ([address] )    Chronic respiratory failure (HCC)    Elevated hemidiaphragm    Presence of intrathecal baclofen pump    Restrictive lung disease      Past Medical and Surgical History:     Past Medical History:   Diagnosis Date    Diabetes mellitus ([address] )      Past Surgical History:   Procedure Laterality Date    BACK SURGERY      KNEE SURGERY Left     VENTRAL HERNIA REPAIR        Family History:     Family History   Problem Relation Age of Onset    Diabetes Mother     Lung cancer Father     Skin cancer Father     Prostate cancer Father     Colon cancer Neg Hx     Hypertension Neg Hx     Hyperlipidemia Neg Hx     Thyroid disease Neg Hx       Social History:     Social History     Socioeconomic History    Marital status:      Spouse name: None    Number of children: None    Years of education: None    Highest education level: None   Occupational History    None   Tobacco Use    Smoking status: Never Smoker    Smokeless tobacco: Never Used   Vaping Use    Vaping Use: Never used   Substance and Sexual Activity    Alcohol use: No    Drug use: No    Sexual activity: None   Other Topics Concern    None   Social History Narrative    None     Social Determinants of Health     Financial Resource Strain: Not on file   Food Insecurity: Not on file   Transportation Needs: Not on file   Physical Activity: Not on file   Stress: Not on file   Social Connections: Not on file   Intimate Partner Violence: Not on file   Housing Stability: Not on file      Medications and Allergies:     Current Outpatient Medications   Medication Sig Dispense Refill    atorvastatin (LIPITOR) 40 mg tablet       baclofen (LIORESAL) 10 MG/20ML by Intrathecal route      BACLOFEN IT by Intrathecal route      HYDROcodone-acetaminophen (NORCO) 5-325 mg per tablet Take 1 tablet by mouth every 6 (six) hours as needed for pain Max Daily Amount: 4 tablets 20 tablet 0    metFORMIN (GLUCOPHAGE) 1000 MG tablet Take 1 tablet (1,000 mg total) by mouth 2 (two) times a day with meals 180 tablet 1    pregabalin (LYRICA) 25 mg capsule Take 1 capsule (25 mg total) by mouth 3 (three) times a day 90 capsule 0    sertraline (ZOLOFT) 50 mg tablet Take 50 mg by mouth daily      zolpidem (AMBIEN) 10 mg tablet Take 1 tablet by mouth      ibuprofen (MOTRIN) 600 mg tablet Take 1 tablet by mouth every 8 (eight) hours as needed for moderate pain (pain) for up to 30 days 15 tablet 0 No current facility-administered medications for this visit  Allergies   Allergen Reactions    Gadolinium Derivatives Itching     Media Ready-Box      Immunizations:     Immunization History   Administered Date(s) Administered    Pneumococcal Polysaccharide PPV23 1970      Health Maintenance:         Topic Date Due    Hepatitis C Screening  Never done    HIV Screening  Never done    Colorectal Cancer Screening  Never done         Topic Date Due    COVID-19 Vaccine (1) Never done    Pneumococcal Vaccine: Pediatrics (0 to 5 Years) and At-Risk Patients (6 to 59 Years) (1 - PCV) 05/12/1974    DTaP,Tdap,and Td Vaccines (1 - Tdap) Never done    Influenza Vaccine (Season Ended) 09/01/2022      Medicare Health Risk Assessment:     /72   Pulse 86   Temp (!) 97 3 °F (36 3 °C)   Ht 5' 10" (1 778 m)   Wt 99 6 kg (219 lb 8 oz)   SpO2 94%   BMI 31 49 kg/m²      Mayra Casillas is here for his Subsequent Wellness visit  Health Risk Assessment:   Patient rates overall health as good  Patient feels that their physical health rating is same  Patient is satisfied with their life  Eyesight was rated as same  Hearing was rated as same  Patient feels that their emotional and mental health rating is same  Patients states they are never, rarely angry  Patient states they are sometimes unusually tired/fatigued  Pain experienced in the last 7 days has been a lot  Patient's pain rating has been 7/10  Patient states that he has experienced no weight loss or gain in last 6 months  Depression Screening:   PHQ-2 Score: 0      Fall Risk Screening: In the past year, patient has experienced: history of falling in past year    Number of falls: 2 or more  Injured during fall?: No    Feels unsteady when standing or walking?: No    Worried about falling?: No      Home Safety:  Patient has trouble with stairs inside or outside of their home  Patient has working smoke alarms and has working carbon monoxide detector  Home safety hazards include: none  Nutrition:   Current diet is Regular  Medications:   Patient is not currently taking any over-the-counter supplements  Patient is able to manage medications  Activities of Daily Living (ADLs)/Instrumental Activities of Daily Living (IADLs):   Walk and transfer into and out of bed and chair?: Yes  Dress and groom yourself?: Yes    Bathe or shower yourself?: Yes    Feed yourself? Yes  Do your laundry/housekeeping?: Yes  Manage your money, pay your bills and track your expenses?: Yes  Make your own meals?: Yes    Do your own shopping?: Yes    Previous Hospitalizations:   Any hospitalizations or ED visits within the last 12 months?: No      Advance Care Planning:   Living will: Yes    Advanced directive: Yes      Cognitive Screening:   Provider or family/friend/caregiver concerned regarding cognition?: No    PREVENTIVE SCREENINGS      Cardiovascular Screening:    General: Screening Not Indicated and History Lipid Disorder      Diabetes Screening:     General: Screening Current      Colorectal Cancer Screening:     General: Screening Current      Prostate Cancer Screening:    General: Screening Current      Osteoporosis Screening:    General: Screening Not Indicated      Abdominal Aortic Aneurysm (AAA) Screening:        General: Screening Not Indicated      Lung Cancer Screening:     General: Screening Not Indicated    Screening, Brief Intervention, and Referral to Treatment (SBIRT)    Screening  Typical number of drinks in a day: 0  Typical number of drinks in a week: 0  Interpretation: Low risk drinking behavior      AUDIT-C Screenin) How often did you have a drink containing alcohol in the past year? never  2) How many drinks did you have on a typical day when you were drinking in the past year? 0  3) How often did you have 6 or more drinks on one occasion in the past year? never    AUDIT-C Score: 0  Interpretation: Score 0-3 (male): Negative screen for alcohol misuse    Single Item Drug Screening:  How often have you used an illegal drug (including marijuana) or a prescription medication for non-medical reasons in the past year? never    Single Item Drug Screen Score: 0  Interpretation: Negative screen for possible drug use disorder    Brief Intervention  Alcohol & drug use screenings were reviewed  No concerns regarding substance use disorder identified  Review of Current Opioid Use    Opioid Risk Tool (ORT) Interpretation: Score 0-3: Low risk for opioid misuse    [place] PDMP or [place]  reviewed   No red flags were identified    Educational information on non-opioid treatment options provided      Sol Red MD

## 2022-06-02 NOTE — TELEPHONE ENCOUNTER
Please call his other doctor to verify he can have an MRI with his baclofen pump    Marley Mcelroy MD    [address]   [address] [facility] [facility] [place] [address] [phone] Norbert Regalado

## 2022-06-10 ENCOUNTER — OFFICE VISIT (OUTPATIENT)
Dept: FAMILY MEDICINE CLINIC | Facility: CLINIC | Age: 52
End: 2022-06-10
Payer: COMMERCIAL

## 2022-06-10 ENCOUNTER — APPOINTMENT (OUTPATIENT)
Dept: LAB | Facility: CLINIC | Age: 52
End: 2022-06-10
Payer: COMMERCIAL

## 2022-06-10 VITALS
DIASTOLIC BLOOD PRESSURE: 70 MMHG | WEIGHT: 220 LBS | HEART RATE: 89 BPM | OXYGEN SATURATION: 96 % | HEIGHT: 70 IN | BODY MASS INDEX: 31.5 KG/M2 | SYSTOLIC BLOOD PRESSURE: 110 MMHG | TEMPERATURE: 97.1 F

## 2022-06-10 DIAGNOSIS — E11.8 DIABETIC FOOT (HCC): Primary | ICD-10-CM

## 2022-06-10 DIAGNOSIS — M54.12 CERVICAL RADICULOPATHY: ICD-10-CM

## 2022-06-10 LAB
BACTERIA UR QL AUTO: ABNORMAL /HPF
BILIRUB UR QL STRIP: NEGATIVE
CLARITY UR: CLEAR
COLOR UR: YELLOW
CREAT UR-MCNC: 139 MG/DL
GLUCOSE UR STRIP-MCNC: ABNORMAL MG/DL
HGB UR QL STRIP.AUTO: NEGATIVE
HYALINE CASTS #/AREA URNS LPF: ABNORMAL /LPF
KETONES UR STRIP-MCNC: NEGATIVE MG/DL
LEUKOCYTE ESTERASE UR QL STRIP: ABNORMAL
MICROALBUMIN UR-MCNC: 6.8 MG/L (ref 0–20)
MICROALBUMIN/CREAT 24H UR: 5 MG/G CREATININE (ref 0–30)
MUCOUS THREADS UR QL AUTO: ABNORMAL
NITRITE UR QL STRIP: NEGATIVE
NON-SQ EPI CELLS URNS QL MICRO: ABNORMAL /HPF
PH UR STRIP.AUTO: 6 [PH]
PROT UR STRIP-MCNC: ABNORMAL MG/DL
RBC #/AREA URNS AUTO: ABNORMAL /HPF
SP GR UR STRIP.AUTO: 1.03 (ref 1–1.03)
UROBILINOGEN UR STRIP-ACNC: <2 MG/DL
WBC #/AREA URNS AUTO: ABNORMAL /HPF

## 2022-06-10 PROCEDURE — 3008F BODY MASS INDEX DOCD: CPT | Performed by: FAMILY MEDICINE

## 2022-06-10 PROCEDURE — 82570 ASSAY OF URINE CREATININE: CPT

## 2022-06-10 PROCEDURE — 99213 OFFICE O/P EST LOW 20 MIN: CPT | Performed by: FAMILY MEDICINE

## 2022-06-10 PROCEDURE — 82043 UR ALBUMIN QUANTITATIVE: CPT

## 2022-06-10 PROCEDURE — 1036F TOBACCO NON-USER: CPT | Performed by: FAMILY MEDICINE

## 2022-06-10 PROCEDURE — 3061F NEG MICROALBUMINURIA REV: CPT | Performed by: FAMILY MEDICINE

## 2022-06-10 PROCEDURE — 81001 URINALYSIS AUTO W/SCOPE: CPT

## 2022-06-10 NOTE — PROGRESS NOTES
Peña Lawson Page 1970 male MRN: [id number]    Acute Visit    Assessment/Plan   Radiculopathy  We discussed Lyrica would be of benefit if the pain reduction outweighs the side effects  We agreed it may not benefit him to take on a regular basis, but may be of benefit if he is having a pain flare  We will switch this to PRN use  Diabetic foot exam  Recommend routine foot care by podiatry     Angie Latham was seen today for follow-up  Diagnoses and all orders for this visit:    Diabetic foot [facility])  -     Ambulatory Referral to Podiatry; Future    Cervical radiculopathy      Troy Pack MD  [address]  6/10/2022      Please be aware that this note contains text that was dictated and there may be errors pertaining to "sound-alike "words during the dictation process  SUBJECTIVE    CC: Follow-up (Discuss medication//)    HPI:  Peña Richmond is a 45 y o  male who presented for an acute visit to discuss medication side effect  He started the Lyrica and has noted he feels more off balance and weak while taking it  He did feel it helped with the burning symptoms of his pain  Review of Systems   Constitutional: Negative for activity change, chills and fever  HENT: Negative for congestion, rhinorrhea and sore throat  Eyes: Negative for visual disturbance  Respiratory: Negative for cough, shortness of breath and wheezing  Cardiovascular: Negative for chest pain and palpitations  Gastrointestinal: Negative for abdominal pain, blood in stool, constipation, diarrhea, nausea and vomiting  Genitourinary: Negative for dysuria  Musculoskeletal: Positive for arthralgias and back pain  Negative for myalgias  Skin: Negative for rash  Neurological: Positive for weakness and light-headedness (off balance)  Negative for dizziness and headaches  All other systems reviewed and are negative      Medications:   Meds/Allergies   Current Outpatient Medications   Medication Sig Dispense Refill    atorvastatin (LIPITOR) 40 mg tablet       baclofen (LIORESAL) 10 MG/20ML by Intrathecal route      BACLOFEN IT by Intrathecal route      metFORMIN (GLUCOPHAGE) 1000 MG tablet Take 1 tablet (1,000 mg total) by mouth 2 (two) times a day with meals 180 tablet 1    pregabalin (LYRICA) 25 mg capsule Take 1 capsule (25 mg total) by mouth 3 (three) times a day 90 capsule 0    sertraline (ZOLOFT) 50 mg tablet Take 50 mg by mouth daily      zolpidem (AMBIEN) 10 mg tablet Take 1 tablet by mouth      HYDROcodone-acetaminophen (NORCO) 5-325 mg per tablet Take 1 tablet by mouth every 6 (six) hours as needed for pain Max Daily Amount: 4 tablets (Patient not taking: Reported on 6/10/2022) 20 tablet 0    ibuprofen (MOTRIN) 600 mg tablet Take 1 tablet by mouth every 8 (eight) hours as needed for moderate pain (pain) for up to 30 days 15 tablet 0     No current facility-administered medications for this visit  Allergies   Allergen Reactions    Gadolinium Derivatives Itching     Media Ready-Box       OBJECTIVE    Vitals:   Vitals:    06/10/22 0900   BP: 110/70   Pulse: 89   Temp: (!) 97 1 °F (36 2 °C)   SpO2: 96%   Weight: 99 8 kg (220 lb)   Height: 5' 10" (1 778 m)       Physical Exam  Vitals and nursing note reviewed  Constitutional:       General: He is not in acute distress  Appearance: He is well-developed  He is not diaphoretic  HENT:      Head: Normocephalic and atraumatic  Right Ear: External ear normal       Left Ear: External ear normal    Eyes:      Conjunctiva/sclera: Conjunctivae normal    Cardiovascular:      Pulses: no weak pulses          Dorsalis pedis pulses are 1+ on the right side and 1+ on the left side  Posterior tibial pulses are 1+ on the right side and 1+ on the left side  Pulmonary:      Effort: Pulmonary effort is normal  No respiratory distress  Musculoskeletal:        Feet:    Feet:      Right foot:      Skin integrity: Callus and dry skin present   No ulcer, skin breakdown, erythema or warmth  Left foot:      Skin integrity: Callus and dry skin present  No ulcer, skin breakdown, erythema or warmth  Skin:     Findings: No rash  Neurological:      Mental Status: He is alert  Cranial Nerves: No cranial nerve deficit  Diabetic Foot Exam    Patient's shoes and socks removed  Right Foot/Ankle   Right Foot Inspection  Skin Exam: skin normal, skin intact, dry skin, callus, abnormal color and callus  No warmth, no erythema, no maceration, no pre-ulcer and no ulcer  Toe Exam: ROM and strength within normal limits  Sensory   Vibration: absent  Monofilament testing: diminished    Vascular  Capillary refills: elevated  The right DP pulse is 1+  The right PT pulse is 1+  Left Foot/Ankle  Left Foot Inspection  Skin Exam: skin normal, skin intact, dry skin, abnormal color and callus  No warmth, no erythema, no maceration, no pre-ulcer and no ulcer  Toe Exam: ROM and strength within normal limits  Sensory   Vibration: absent  Monofilament testing: diminished    Vascular  Capillary refills: elevated  The left DP pulse is 1+  The left PT pulse is 1+       Assign Risk Category  No deformity present  No loss of protective sensation  No weak pulses  Risk: 0

## 2022-06-20 ENCOUNTER — TELEPHONE (OUTPATIENT)
Dept: FAMILY MEDICINE CLINIC | Facility: CLINIC | Age: 52
End: 2022-06-20

## 2022-06-20 DIAGNOSIS — M54.12 CERVICAL RADICULOPATHY: Primary | ICD-10-CM

## 2022-06-20 NOTE — TELEPHONE ENCOUNTER
Pt called stating he can not get an MRI because of implants  States he needs you to order a cat scan instead

## 2022-07-01 ENCOUNTER — TELEPHONE (OUTPATIENT)
Dept: FAMILY MEDICINE CLINIC | Facility: CLINIC | Age: 52
End: 2022-07-01

## 2022-07-01 NOTE — TELEPHONE ENCOUNTER
Please call patient  Please let him know insurance denied his CT of the neck  They will approve it if he completes 6 weeks of physical therapy, either next door or with a home exercise program     Please mail letter to him that I created with exercises  I will cancel his CT

## 2022-07-01 NOTE — TELEPHONE ENCOUNTER
Explained to patient   He said he will look into his PT coverage and decide if he wants to do in home or come next door and let us know     I told him in the meantime I am going to send him the in home exercises so he has them

## 2022-07-06 DIAGNOSIS — M54.12 CERVICAL RADICULOPATHY: ICD-10-CM

## 2022-07-06 RX ORDER — PREGABALIN 25 MG/1
CAPSULE ORAL
Qty: 90 CAPSULE | Refills: 0 | Status: SHIPPED | OUTPATIENT
Start: 2022-07-06 | End: 2022-08-12

## 2022-07-19 ENCOUNTER — CONSULT (OUTPATIENT)
Dept: PODIATRY | Facility: CLINIC | Age: 52
End: 2022-07-19
Payer: COMMERCIAL

## 2022-07-19 VITALS — HEIGHT: 70 IN | BODY MASS INDEX: 31.78 KG/M2 | WEIGHT: 222 LBS

## 2022-07-19 DIAGNOSIS — B35.3 TINEA PEDIS OF BOTH FEET: ICD-10-CM

## 2022-07-19 DIAGNOSIS — M24.573 EQUINUS CONTRACTURE OF ANKLE: ICD-10-CM

## 2022-07-19 DIAGNOSIS — E11.40 TYPE 2 DIABETES MELLITUS WITH DIABETIC NEUROPATHY, WITHOUT LONG-TERM CURRENT USE OF INSULIN (HCC): Primary | ICD-10-CM

## 2022-07-19 PROCEDURE — 99203 OFFICE O/P NEW LOW 30 MIN: CPT | Performed by: PODIATRIST

## 2022-07-19 RX ORDER — CLOTRIMAZOLE AND BETAMETHASONE DIPROPIONATE 10; .64 MG/G; MG/G
CREAM TOPICAL 2 TIMES DAILY
Qty: 45 G | Refills: 6 | Status: SHIPPED | OUTPATIENT
Start: 2022-07-19 | End: 2022-08-26 | Stop reason: ALTCHOICE

## 2022-07-19 NOTE — PROGRESS NOTES
PATIENT:  Guilherme Pizarro Page    1970    ASSESSMENT:     1  Type 2 diabetes mellitus with diabetic neuropathy, without long-term current use of insulin ([facility] [address] )  Ambulatory Referral to Podiatry   2  Tinea pedis of both feet  clotrimazole-betamethasone (LOTRISONE) 1-0 05 % cream   3  Equinus contracture of ankle           PLAN:  1  Reviewed medical records and note from PCP  Patient was counseled on the condition and diagnosis  2   Educated disease prevention and risks related to diabetes  3   Educated proper daily foot care and exam   Instructed proper skin care / protection and footwear  Instructed to identify any signs of infection and related foot problem  4   The recent blood work was reviewed / discussed and the last HbA1c was 8 3  Discussed proper blood glucose control with diet and exercise  5  Rx: Lotrisone cream for chronic tinea pedis  Discussed proper skin care  6  Instructed stretching exercise for ankle equinus  7  He has low risk diabetic foot and will return in 1 year for diabetic foot exam       Imaging: I have personally reviewed pertinent films in PACS  Labs, pathology, and Other Studies: I have personally reviewed pertinent reports  Subjective:          HPI  The patient was referred to my office for diabetic foot evaluation  The patient has diabetes for 20 years  The blood glucose has been better  The patient denied any history of diabetic foot ulcer, related foot infection, or amputation  He has some numbness and paresthesia  He related this to spine surgery  Denied weakness or significant functional deficit  The patient presents with sneakers  No acute pedal problems  The following portions of the patient's history were reviewed and updated as appropriate: allergies, current medications, past family history, past medical history, past social history, past surgical history and problem list   All pertinent labs and images were reviewed      Past Medical History  Past Medical History:   Diagnosis Date    Diabetes mellitus ([facility] [address] )        Past Surgical History  Past Surgical History:   Procedure Laterality Date    BACK SURGERY      KNEE SURGERY Left     VENTRAL HERNIA REPAIR          Allergies:  Gadolinium derivatives    Medications:  Current Outpatient Medications   Medication Sig Dispense Refill    atorvastatin (LIPITOR) 40 mg tablet       baclofen (LIORESAL) 10 MG/20ML by Intrathecal route      BACLOFEN IT by Intrathecal route      clotrimazole-betamethasone (LOTRISONE) 1-0 05 % cream Apply topically 2 (two) times a day 45 g 6    metFORMIN (GLUCOPHAGE) 1000 MG tablet Take 1 tablet (1,000 mg total) by mouth 2 (two) times a day with meals 180 tablet 1    pregabalin (LYRICA) 25 mg capsule TAKE 1 CAPSULE BY MOUTH 3 TIMES A DAY  90 capsule 0    sertraline (ZOLOFT) 50 mg tablet Take 50 mg by mouth daily      zolpidem (AMBIEN) 10 mg tablet Take 1 tablet by mouth      HYDROcodone-acetaminophen (NORCO) 5-325 mg per tablet Take 1 tablet by mouth every 6 (six) hours as needed for pain Max Daily Amount: 4 tablets (Patient not taking: No sig reported) 20 tablet 0    ibuprofen (MOTRIN) 600 mg tablet Take 1 tablet by mouth every 8 (eight) hours as needed for moderate pain (pain) for up to 30 days 15 tablet 0     No current facility-administered medications for this visit         Social History:  Social History     Socioeconomic History    Marital status:      Spouse name: None    Number of children: None    Years of education: None    Highest education level: None   Occupational History    None   Tobacco Use    Smoking status: Never Smoker    Smokeless tobacco: Never Used   Vaping Use    Vaping Use: Never used   Substance and Sexual Activity    Alcohol use: No    Drug use: No    Sexual activity: None   Other Topics Concern    None   Social History Narrative    None     Social Determinants of Health     Financial Resource Strain: Not on file   Food Insecurity: Not on file   Transportation Needs: Not on file   Physical Activity: Not on file   Stress: Not on file   Social Connections: Not on file   Intimate Partner Violence: Not on file   Housing Stability: Not on file        Review of Systems   Constitutional: Negative for appetite change, chills and fever  HENT: Negative for sore throat  Eyes: Negative for visual disturbance  Respiratory: Negative for cough and shortness of breath  Cardiovascular: Negative for chest pain  Gastrointestinal: Negative for diarrhea, nausea and vomiting  Musculoskeletal: Negative for gait problem and joint swelling  Skin: Negative for rash and wound  Neurological: Positive for numbness  Negative for weakness  Hematological: Negative  Psychiatric/Behavioral: Negative for behavioral problems and confusion  Objective:      Ht 5' 10" (1 778 m) Comment: verbal  Wt 101 kg (222 lb)   BMI 31 85 kg/m²          Physical Exam  Vitals reviewed  Constitutional:       General: He is not in acute distress  Appearance: He is obese  He is not toxic-appearing  HENT:      Head: Normocephalic and atraumatic  Eyes:      Extraocular Movements: Extraocular movements intact  Cardiovascular:      Rate and Rhythm: Normal rate and regular rhythm  Pulses: Normal pulses  no weak pulses          Dorsalis pedis pulses are 2+ on the right side and 2+ on the left side  Posterior tibial pulses are 2+ on the right side and 2+ on the left side  Pulmonary:      Effort: Pulmonary effort is normal  No respiratory distress  Musculoskeletal:         General: Deformity present  No swelling, tenderness or signs of injury  Cervical back: Normal range of motion and neck supple  Right foot: No Charcot foot or foot drop  Left foot: No Charcot foot or foot drop  Comments: Pes cavus presents  Tight achilles/ calf with moderate equinus     Feet:      Right foot:      Protective Sensation: 10 sites tested  10 sites sensed  Skin integrity: Dry skin present  No ulcer or skin breakdown  Left foot:      Protective Sensation: 10 sites tested  10 sites sensed  Skin integrity: Dry skin present  No ulcer or skin breakdown  Skin:     General: Skin is warm and dry  Capillary Refill: Capillary refill takes less than 2 seconds  Coloration: Skin is not cyanotic or mottled  Findings: No abscess or ecchymosis  Nails: There is no clubbing  Comments: Chronic tinea pedis with skin peeling and redness around toes and plantar feet  Thick, mycotic toenails noted  Neurological:      General: No focal deficit present  Mental Status: He is alert and oriented to person, place, and time  Cranial Nerves: No cranial nerve deficit  Sensory: No sensory deficit  Motor: No weakness  Coordination: Coordination normal    Psychiatric:         Mood and Affect: Mood normal          Behavior: Behavior normal          Thought Content: Thought content normal          Judgment: Judgment normal              Diabetic Foot Exam    Patient's shoes and socks removed  Right Foot/Ankle   Right Foot Inspection  Skin Exam: skin intact and dry skin  No maceration, no pre-ulcer and no ulcer  Toe Exam: No swelling, no tenderness, erythema and  no right toe deformity    Sensory   Vibration: diminished  Proprioception: intact  Monofilament testing: intact    Vascular  Capillary refills: < 3 seconds  The right DP pulse is 2+  The right PT pulse is 2+  Left Foot/Ankle  Left Foot Inspection  Skin Exam: skin intact and dry skin  No maceration, no pre-ulcer and no ulcer  Toe Exam: No swelling, no tenderness, no erythema and no left toe deformity  Sensory   Vibration: diminished  Proprioception: intact  Monofilament testing: intact    Vascular  Capillary refills: < 3 seconds  The left DP pulse is 2+  The left PT pulse is 2+       Assign Risk Category  No deformity present  No loss of protective sensation  No weak pulses  Risk: 0

## 2022-07-19 NOTE — LETTER
July 20, 2022     Melina Nobles MD  [id number] [address] [address]    Patient: Tabby Jackson Page   YOB: 1970   Date of Visit: 7/19/2022       Dear Dr Nohemi Gardner: Thank you for referring Erica Garcia to me for evaluation  Below are my notes for this consultation  If you have questions, please do not hesitate to call me  I look forward to following your patient along with you  Sincerely,        Milad Bernstein DPM        CC: No Recipients  CHARIS Moura [facility]  7/20/2022  6:30 PM  Sign when Signing Visit        PATIENT:  Tabby Jackson Page    1970    ASSESSMENT:     1  Type 2 diabetes mellitus with diabetic neuropathy, without long-term current use of insulin ([facility] [address] )  Ambulatory Referral to Podiatry   2  Tinea pedis of both feet  clotrimazole-betamethasone (LOTRISONE) 1-0 05 % cream   3  Equinus contracture of ankle           PLAN:  1  Reviewed medical records and note from PCP  Patient was counseled on the condition and diagnosis  2   Educated disease prevention and risks related to diabetes  3   Educated proper daily foot care and exam   Instructed proper skin care / protection and footwear  Instructed to identify any signs of infection and related foot problem  4   The recent blood work was reviewed / discussed and the last HbA1c was 8 3  Discussed proper blood glucose control with diet and exercise  5  Rx: Lotrisone cream for chronic tinea pedis  Discussed proper skin care  6  Instructed stretching exercise for ankle equinus  7  He has low risk diabetic foot and will return in 1 year for diabetic foot exam       Imaging: I have personally reviewed pertinent films in PACS  Labs, pathology, and Other Studies: I have personally reviewed pertinent reports  Subjective:          HPI  The patient was referred to my office for diabetic foot evaluation  The patient has diabetes for 20 years  The blood glucose has been better    The patient denied any history of diabetic foot ulcer, related foot infection, or amputation  He has some numbness and paresthesia  He related this to spine surgery  Denied weakness or significant functional deficit  The patient presents with sneakers  No acute pedal problems  The following portions of the patient's history were reviewed and updated as appropriate: allergies, current medications, past family history, past medical history, past social history, past surgical history and problem list   All pertinent labs and images were reviewed  Past Medical History  Past Medical History:   Diagnosis Date    Diabetes mellitus ([address] )        Past Surgical History  Past Surgical History:   Procedure Laterality Date    BACK SURGERY      KNEE SURGERY Left     VENTRAL HERNIA REPAIR          Allergies:  Gadolinium derivatives    Medications:  Current Outpatient Medications   Medication Sig Dispense Refill    atorvastatin (LIPITOR) 40 mg tablet       baclofen (LIORESAL) 10 MG/20ML by Intrathecal route      BACLOFEN IT by Intrathecal route      clotrimazole-betamethasone (LOTRISONE) 1-0 05 % cream Apply topically 2 (two) times a day 45 g 6    metFORMIN (GLUCOPHAGE) 1000 MG tablet Take 1 tablet (1,000 mg total) by mouth 2 (two) times a day with meals 180 tablet 1    pregabalin (LYRICA) 25 mg capsule TAKE 1 CAPSULE BY MOUTH 3 TIMES A DAY  90 capsule 0    sertraline (ZOLOFT) 50 mg tablet Take 50 mg by mouth daily      zolpidem (AMBIEN) 10 mg tablet Take 1 tablet by mouth      HYDROcodone-acetaminophen (NORCO) 5-325 mg per tablet Take 1 tablet by mouth every 6 (six) hours as needed for pain Max Daily Amount: 4 tablets (Patient not taking: No sig reported) 20 tablet 0    ibuprofen (MOTRIN) 600 mg tablet Take 1 tablet by mouth every 8 (eight) hours as needed for moderate pain (pain) for up to 30 days 15 tablet 0     No current facility-administered medications for this visit         Social History:  Social History     Socioeconomic History    Marital status:      Spouse name: None    Number of children: None    Years of education: None    Highest education level: None   Occupational History    None   Tobacco Use    Smoking status: Never Smoker    Smokeless tobacco: Never Used   Vaping Use    Vaping Use: Never used   Substance and Sexual Activity    Alcohol use: No    Drug use: No    Sexual activity: None   Other Topics Concern    None   Social History Narrative    None     Social Determinants of Health     Financial Resource Strain: Not on file   Food Insecurity: Not on file   Transportation Needs: Not on file   Physical Activity: Not on file   Stress: Not on file   Social Connections: Not on file   Intimate Partner Violence: Not on file   Housing Stability: Not on file        Review of Systems   Constitutional: Negative for appetite change, chills and fever  HENT: Negative for sore throat  Eyes: Negative for visual disturbance  Respiratory: Negative for cough and shortness of breath  Cardiovascular: Negative for chest pain  Gastrointestinal: Negative for diarrhea, nausea and vomiting  Musculoskeletal: Negative for gait problem and joint swelling  Skin: Negative for rash and wound  Neurological: Positive for numbness  Negative for weakness  Hematological: Negative  Psychiatric/Behavioral: Negative for behavioral problems and confusion  Objective:      Ht 5' 10" (1 778 m) Comment: verbal  Wt 101 kg (222 lb)   BMI 31 85 kg/m²          Physical Exam  Vitals reviewed  Constitutional:       General: He is not in acute distress  Appearance: He is obese  He is not toxic-appearing  HENT:      Head: Normocephalic and atraumatic  Eyes:      Extraocular Movements: Extraocular movements intact  Cardiovascular:      Rate and Rhythm: Normal rate and regular rhythm  Pulses: Normal pulses  no weak pulses          Dorsalis pedis pulses are 2+ on the right side and 2+ on the left side          Posterior tibial pulses are 2+ on the right side and 2+ on the left side  Pulmonary:      Effort: Pulmonary effort is normal  No respiratory distress  Musculoskeletal:         General: Deformity present  No swelling, tenderness or signs of injury  Cervical back: Normal range of motion and neck supple  Right foot: No Charcot foot or foot drop  Left foot: No Charcot foot or foot drop  Comments: Pes cavus presents  Tight achilles/ calf with moderate equinus  Feet:      Right foot:      Protective Sensation: 10 sites tested  10 sites sensed  Skin integrity: Dry skin present  No ulcer or skin breakdown  Left foot:      Protective Sensation: 10 sites tested  10 sites sensed  Skin integrity: Dry skin present  No ulcer or skin breakdown  Skin:     General: Skin is warm and dry  Capillary Refill: Capillary refill takes less than 2 seconds  Coloration: Skin is not cyanotic or mottled  Findings: No abscess or ecchymosis  Nails: There is no clubbing  Comments: Chronic tinea pedis with skin peeling and redness around toes and plantar feet  Thick, mycotic toenails noted  Neurological:      General: No focal deficit present  Mental Status: He is alert and oriented to person, place, and time  Cranial Nerves: No cranial nerve deficit  Sensory: No sensory deficit  Motor: No weakness  Coordination: Coordination normal    Psychiatric:         Mood and Affect: Mood normal          Behavior: Behavior normal          Thought Content: Thought content normal          Judgment: Judgment normal              Diabetic Foot Exam    Patient's shoes and socks removed  Right Foot/Ankle   Right Foot Inspection  Skin Exam: skin intact and dry skin  No maceration, no pre-ulcer and no ulcer       Toe Exam: No swelling, no tenderness, erythema and  no right toe deformity    Sensory   Vibration: diminished  Proprioception: intact  Monofilament testing: intact    Vascular  Capillary refills: < 3 seconds  The right DP pulse is 2+  The right PT pulse is 2+  Left Foot/Ankle  Left Foot Inspection  Skin Exam: skin intact and dry skin  No maceration, no pre-ulcer and no ulcer  Toe Exam: No swelling, no tenderness, no erythema and no left toe deformity  Sensory   Vibration: diminished  Proprioception: intact  Monofilament testing: intact    Vascular  Capillary refills: < 3 seconds  The left DP pulse is 2+  The left PT pulse is 2+       Assign Risk Category  No deformity present  No loss of protective sensation  No weak pulses  Risk: 0

## 2022-07-19 NOTE — PATIENT INSTRUCTIONS
Foot Care for People with Diabetes   AMBULATORY CARE:   What you need to know about foot care: Foot care helps protect your feet and prevent foot ulcers or sores  Long-term high blood sugar levels can damage the blood vessels and nerves in your legs and feet  This damage makes it hard to feel pressure, pain, temperature, and touch  You may not be able to feel a cut or sore, or shoes that are too tight  Foot care is needed to prevent serious problems, such as an infection or amputation  Diabetes may cause your toes to become crooked or curved under  These changes may affect the way you walk and can lead to increased pressure on your foot  The pressure can decrease blood flow to your feet  Lack of blood flow increases your risk for a foot ulcer  Do not ignore small problems, such as dry skin or small wounds  These can become life-threatening over time without proper care  Call your care team provider if:   Your feet become numb, weak, or hard to move  You have pus draining from a sore on your foot  You have a wound on your foot that gets bigger, deeper, or does not heal      You see blisters, cuts, scratches, calluses, or sores on your foot  You have a fever, and your feet become red, warm, and swollen  Your toenails become thick, curled, or yellow  You find it hard to check your feet because your vision is poor  You have questions or concerns about your condition or care  How to care for your feet:   Check your feet each day  Look at your whole foot, including the bottom, and between and under your toes  Check for wounds, corns, and calluses  Use a mirror to see the bottom of your feet  The skin on your feet may be shiny, tight, or darker than normal  Your feet may also be cold and pale  Feel your feet by running your hands along the tops, bottoms, sides, and between your toes  Redness, swelling, and warmth are signs of blood flow problems that can lead to a foot ulcer   Do not try to remove corns or calluses yourself  Wash your feet each day with soap and warm water  Do not use hot water, because this can injure your foot  Dry your feet gently with a towel after you wash them  Dry between and under your toes  Apply lotion or a moisturizer on your dry feet  Ask your care team provider what lotions are best to use  Do not put lotion or moisturizer between your toes  Moisture between your toes could lead to skin breakdown  Cut your toenails correctly  File or cut your toenails straight across  Use a soft brush to clean around your toenails  If your toenails are very thick, you may need to have a care team provider or specialist cut them  Protect your feet  Do not walk barefoot or wear your shoes without socks  Check your shoes for rocks or other objects that can hurt your feet  Wear cotton socks to help keep your feet dry  Wear socks without toe seams, or wear them with the seams inside out  Change your socks each day  Do not wear socks that are dirty or damp  Wear shoes that fit well  Wear shoes that do not rub against any area of your feet  Your shoes should be ½ to ¾ inch (1 to 2 centimeters) longer than your feet  Your shoes should also have extra space around the widest part of your feet  Walking or athletic shoes with laces or straps that adjust are best  Ask your care team provider for help to choose shoes that fit you best  Ask him or her if you need to wear an insert, orthotic, or bandage on your feet  Go to your follow-up visits  Your care team provider will do a foot exam at least once a year  You may need a foot exam more often if you have nerve damage, foot deformities, or ulcers  He will check for nerve damage and how well you can feel your feet  He will check your shoes to see if they fit well  Do not smoke  Smoking can damage your blood vessels and put you at increased risk for foot ulcers   Ask your care team provider for information if you currently smoke and need help to quit  E-cigarettes or smokeless tobacco still contain nicotine  Talk to your care team provider before you use these products  Follow up with your diabetes care team provider or foot specialist as directed: You will need to have your feet checked at least once a year  You may need a foot exam more often if you have nerve damage, foot deformities, or ulcers  Write down your questions so you remember to ask them during your visits  © Copyright [id number] 2022 Information is for End User's use only and may not be sold, redistributed or otherwise used for commercial purposes  All illustrations and images included in CareNotes® are the copyrighted property of A D A M , Inc  or [facility] Joselito Hubbard   The above information is an  only  It is not intended as medical advice for individual conditions or treatments  Talk to your doctor, nurse or pharmacist before following any medical regimen to see if it is safe and effective for you

## 2022-07-25 DIAGNOSIS — F51.01 PRIMARY INSOMNIA: Primary | ICD-10-CM

## 2022-07-25 DIAGNOSIS — F33.0 MILD EPISODE OF RECURRENT MAJOR DEPRESSIVE DISORDER (HCC): ICD-10-CM

## 2022-07-25 RX ORDER — ZOLPIDEM TARTRATE 10 MG/1
10 TABLET ORAL
Qty: 30 TABLET | Refills: 0 | Status: SHIPPED | OUTPATIENT
Start: 2022-07-25 | End: 2022-08-24

## 2022-07-25 NOTE — TELEPHONE ENCOUNTER
1  [id number] 07/06/2022 07/06/2022 Pregabalin (Capsule)  90 0 30 25 MG NA [facility]  [place] / [place]    1  [id number] 06/03/2022 06/02/2022 Pregabalin (Capsule)  90 0 30 25 MG NA [facility]  [place] 0 / 0 [place]    1  [id number] 04/28/2022 04/28/2022 Zolpidem Tartrate (Tablet)  90 0 90 10 MG NA [address] [place] [address] [facility]    Medicare 00 / 0 [place]

## 2022-08-12 DIAGNOSIS — M54.12 CERVICAL RADICULOPATHY: ICD-10-CM

## 2022-08-12 RX ORDER — PREGABALIN 25 MG/1
CAPSULE ORAL
Qty: 90 CAPSULE | Refills: 0 | Status: SHIPPED | OUTPATIENT
Start: 2022-08-12 | End: 2022-08-26 | Stop reason: SINTOL

## 2022-08-12 NOTE — TELEPHONE ENCOUNTER
[place]MP verified  Patient follows with me for this controlled substance  Last refills:    07/25/2022  1   07/25/2022  Zolpidem Tartrate 10 MG Tablet    30 00  [address]   [id number]   Pen (2423)     [place]   [place]   07/06/2022  1   07/06/2022  Pregabalin 25 MG Capsule    90 00  30 [address]   [id number]   Pen (242           I will refill as ordered

## 2022-08-24 DIAGNOSIS — F51.01 PRIMARY INSOMNIA: ICD-10-CM

## 2022-08-24 RX ORDER — ZOLPIDEM TARTRATE 10 MG/1
TABLET ORAL
Qty: 30 TABLET | Refills: 0 | Status: SHIPPED | OUTPATIENT
Start: 2022-08-24 | End: 2022-09-22 | Stop reason: SDUPTHER

## 2022-08-24 NOTE — TELEPHONE ENCOUNTER
1  [id number] 07/25/2022 07/25/2022 Zolpidem Tartrate (Tablet)  30 0 30 10 MG NA [facility]  [place] 0 / 0 [place]    1  [id number] 07/06/2022 07/06/2022 Pregabalin (Capsule)  90 0 30 25 MG NA [facility]  [place] 0 / 0 [place]    1  [id number] 06/03/2022 06/02/2022 Pregabalin (Capsule)  90 0 30 25 MG NA [facility]    [place]are 0 / 0 [place]

## 2022-08-25 ENCOUNTER — TELEPHONE (OUTPATIENT)
Dept: FAMILY MEDICINE CLINIC | Facility: CLINIC | Age: 52
End: 2022-08-25

## 2022-08-26 ENCOUNTER — OFFICE VISIT (OUTPATIENT)
Dept: FAMILY MEDICINE CLINIC | Facility: CLINIC | Age: 52
End: 2022-08-26
Payer: COMMERCIAL

## 2022-08-26 VITALS
SYSTOLIC BLOOD PRESSURE: 114 MMHG | OXYGEN SATURATION: 95 % | HEIGHT: 70 IN | WEIGHT: 222.5 LBS | DIASTOLIC BLOOD PRESSURE: 60 MMHG | HEART RATE: 77 BPM | BODY MASS INDEX: 31.85 KG/M2 | RESPIRATION RATE: 16 BRPM | TEMPERATURE: 97.6 F

## 2022-08-26 DIAGNOSIS — E11.65 CONTROLLED TYPE 2 DIABETES MELLITUS WITH HYPERGLYCEMIA, WITHOUT LONG-TERM CURRENT USE OF INSULIN (HCC): Primary | ICD-10-CM

## 2022-08-26 DIAGNOSIS — G83.9 SPASTIC PARESIS (HCC): ICD-10-CM

## 2022-08-26 PROCEDURE — 99213 OFFICE O/P EST LOW 20 MIN: CPT | Performed by: FAMILY MEDICINE

## 2022-08-26 RX ORDER — MELOXICAM 15 MG/1
15 TABLET ORAL DAILY
Qty: 30 TABLET | Refills: 1 | Status: SHIPPED | OUTPATIENT
Start: 2022-08-26 | End: 2022-10-19

## 2022-08-26 RX ORDER — HYDROCODONE BITARTRATE AND ACETAMINOPHEN 5; 325 MG/1; MG/1
1 TABLET ORAL DAILY PRN
Qty: 30 TABLET | Refills: 0 | Status: SHIPPED | OUTPATIENT
Start: 2022-08-26

## 2022-08-26 NOTE — PROGRESS NOTES
Robb Jackman Page 1970 male MRN: [id number]    Acute Visit    Assessment/Plan   Ana Maria Ramos was seen today for fall  Diagnoses and all orders for this visit:    Controlled type 2 diabetes mellitus with hyperglycemia, without long-term current use of insulin (HCC)  -     meloxicam (Mobic) 15 mg tablet; Take 1 tablet (15 mg total) by mouth daily  -     Ambulatory Referral to Ophthalmology; Future    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HCC)  -     HYDROcodone-acetaminophen (NORCO) 5-325 mg per tablet; Take 1 tablet by mouth daily as needed (severe pain) Max Daily Amount: 1 tablet    Vickie Pinto MD  [address]  8/26/2022      Please be aware that this note contains text that was dictated and there may be errors pertaining to "sound-alike "words during the dictation process  SUBJECTIVE    CC: Fall (Due to medication (Lyrica) making patient "wobbly"/1 fall a week ago; 1 fall was two days ago/New intrafecal pump placed about a month ago by LÁZARO BURNETTE per patient)      HPI:  Jeff Sin is a 45 y o  male who presented for an acute visit to discuss since starting the Lyrica he has had some slight improvement with his pain, but he has felt more wobbly with walking  He has fallen twice since starting it but luckily has not injured  He's previously tried gabapentin but it made him loopy  He says that his pain is 7/10 today  He notes they swapped his baclofen pump a month ago  Review of Systems   Constitutional: Negative for activity change, chills and fever  HENT: Negative for congestion, rhinorrhea and sore throat  Eyes: Negative for visual disturbance  Respiratory: Negative for cough, shortness of breath and wheezing  Cardiovascular: Negative for chest pain and palpitations  Gastrointestinal: Negative for abdominal pain, blood in stool, constipation, diarrhea, nausea and vomiting  Genitourinary: Negative for dysuria  Musculoskeletal: Positive for arthralgias, back pain and myalgias     Skin: Negative for rash  Neurological: Negative for dizziness, weakness and headaches  All other systems reviewed and are negative  Medications:   Meds/Allergies   Current Outpatient Medications   Medication Sig Dispense Refill    atorvastatin (LIPITOR) 40 mg tablet Take 40 mg by mouth daily      baclofen (LIORESAL) 10 MG/20ML by Intrathecal route if needed      HYDROcodone-acetaminophen (NORCO) 5-325 mg per tablet Take 1 tablet by mouth daily as needed (severe pain) Max Daily Amount: 1 tablet 30 tablet 0    ibuprofen (MOTRIN) 600 mg tablet Take 1 tablet by mouth every 8 (eight) hours as needed for moderate pain (pain) for up to 30 days 15 tablet 0    meloxicam (Mobic) 15 mg tablet Take 1 tablet (15 mg total) by mouth daily 30 tablet 1    metFORMIN (GLUCOPHAGE) 1000 MG tablet Take 1 tablet (1,000 mg total) by mouth 2 (two) times a day with meals 180 tablet 1    sertraline (ZOLOFT) 50 mg tablet Take 1 tablet (50 mg total) by mouth daily 90 tablet 1    zolpidem (AMBIEN) 10 mg tablet TAKE 1 TABLET BY MOUTH DAILY AT BEDTIME AS NEEDED FOR SLEEP 30 tablet 0     No current facility-administered medications for this visit  Allergies   Allergen Reactions    Gadolinium Derivatives Itching     Media Ready-Box (Contrast Dye)        OBJECTIVE    Vitals:   Vitals:    08/26/22 1433   BP: 114/60   Pulse: 77   Resp: 16   Temp: 97 6 °F (36 4 °C)   SpO2: 95%   Weight: 101 kg (222 lb 8 oz)   Height: 5' 10" (1 778 m)       Physical Exam  Constitutional:       General: He is not in acute distress  Appearance: He is well-developed  He is not diaphoretic  HENT:      Head: Normocephalic and atraumatic  Right Ear: External ear normal       Left Ear: External ear normal    Eyes:      Conjunctiva/sclera: Conjunctivae normal    Pulmonary:      Effort: Pulmonary effort is normal  No respiratory distress  Skin:     Findings: No rash  Neurological:      Mental Status: He is alert

## 2022-09-13 ENCOUNTER — OFFICE VISIT (OUTPATIENT)
Dept: FAMILY MEDICINE CLINIC | Facility: CLINIC | Age: 52
End: 2022-09-13
Payer: COMMERCIAL

## 2022-09-13 VITALS
HEIGHT: 70 IN | DIASTOLIC BLOOD PRESSURE: 72 MMHG | OXYGEN SATURATION: 98 % | SYSTOLIC BLOOD PRESSURE: 124 MMHG | BODY MASS INDEX: 32.07 KG/M2 | TEMPERATURE: 97.8 F | WEIGHT: 224 LBS | HEART RATE: 96 BPM

## 2022-09-13 DIAGNOSIS — Z12.5 PROSTATE CANCER SCREENING: ICD-10-CM

## 2022-09-13 DIAGNOSIS — E11.65 TYPE 2 DIABETES MELLITUS WITH HYPERGLYCEMIA, WITHOUT LONG-TERM CURRENT USE OF INSULIN (HCC): Primary | ICD-10-CM

## 2022-09-13 DIAGNOSIS — Z11.59 ENCOUNTER FOR HEPATITIS C SCREENING TEST FOR LOW RISK PATIENT: ICD-10-CM

## 2022-09-13 LAB — SL AMB POCT HEMOGLOBIN AIC: 7.9 (ref ?–6.5)

## 2022-09-13 PROCEDURE — 3051F HG A1C>EQUAL 7.0%<8.0%: CPT | Performed by: FAMILY MEDICINE

## 2022-09-13 PROCEDURE — 83036 HEMOGLOBIN GLYCOSYLATED A1C: CPT | Performed by: FAMILY MEDICINE

## 2022-09-13 PROCEDURE — 99214 OFFICE O/P EST MOD 30 MIN: CPT | Performed by: FAMILY MEDICINE

## 2022-09-13 NOTE — PROGRESS NOTES
Sydney Su Page 1970 male MRN: [id number]    Family Medicine Follow-up Visit    Assessment/Plan   Type 2 diabetes mellitus with hyperglycemia, without long-term current use of insulin ([facility] [address] )    Lab Results   Component Value Date    HGBA1C 7 9 (A) 09/13/2022   Improving (down from 8 3)  Continue metformin 1000mg BID  Would add GLP-1 agonist or similar in the future if still not at goal  S/p foot exam  Obtain eye exam  Declined PNA shot  F/u 3 months and obtain labs prior     Apollo Kearns was seen today for follow-up  Diagnoses and all orders for this visit:    Type 2 diabetes mellitus with hyperglycemia, without long-term current use of insulin (HCC)  -     POCT hemoglobin A1c  -     Lipid Panel with Direct LDL reflex; Future  -     Cancel: Hemoglobin A1C; Future  -     Comprehensive metabolic panel; Future  -     CBC; Future  -     Cancel: TSH, 3rd generation with Free T4 reflex; Future    Prostate cancer screening  -     PSA, Total Screen; Future    Encounter for hepatitis C screening test for low risk patient  -     Hepatitis C antibody; Future      Valeriano Anna MD  [address]  9/13/2022      Please be aware that this note contains text that was dictated and there may be errors pertaining to "sound-alike" words during the dictation process  SUBJECTIVE    CC: Follow-up    HPI:  Vijaya De Dios is a 45 y o  male who presented for a follow-up of diabetes  He is following a diabetic diet  He denies hypoglycemic episodes/symptoms  He is generally 120's in the morning but doesn't often check anymore  He is doing well and feels better on the higher dose of metformin  Pain is ok recently  He has tried the Mobic but wasn't sure how much it helped  Might have helped  Symptoms resolved after he stopped Lyrica  Review of Systems   Constitutional: Negative for activity change, chills and fever  HENT: Negative for congestion, rhinorrhea and sore throat      Eyes: Negative for visual disturbance  Respiratory: Negative for cough, shortness of breath and wheezing  Cardiovascular: Negative for chest pain and palpitations  Gastrointestinal: Negative for abdominal pain, blood in stool, constipation, diarrhea, nausea and vomiting  Genitourinary: Negative for dysuria  Musculoskeletal: Positive for arthralgias, back pain and myalgias  Pain is pretty good over the last few days   Skin: Negative for rash  Neurological: Positive for light-headedness  Negative for dizziness and headaches  All other systems reviewed and are negative      Historical Information     The following portions of the patient's history were reviewed and updated as appropriate: allergies, current medications, past medical history, past social history and problem list     Medications:   Meds/Allergies     Current Outpatient Medications:     atorvastatin (LIPITOR) 40 mg tablet, Take 40 mg by mouth daily, Disp: , Rfl:     baclofen (LIORESAL) 10 MG/20ML, by Intrathecal route if needed, Disp: , Rfl:     HYDROcodone-acetaminophen (NORCO) 5-325 mg per tablet, Take 1 tablet by mouth daily as needed (severe pain) Max Daily Amount: 1 tablet, Disp: 30 tablet, Rfl: 0    ibuprofen (MOTRIN) 600 mg tablet, Take 1 tablet by mouth every 8 (eight) hours as needed for moderate pain (pain) for up to 30 days, Disp: 15 tablet, Rfl: 0    metFORMIN (GLUCOPHAGE) 1000 MG tablet, Take 1 tablet (1,000 mg total) by mouth 2 (two) times a day with meals, Disp: 180 tablet, Rfl: 1    sertraline (ZOLOFT) 50 mg tablet, Take 1 tablet (50 mg total) by mouth daily, Disp: 90 tablet, Rfl: 1    zolpidem (AMBIEN) 10 mg tablet, TAKE 1 TABLET BY MOUTH DAILY AT BEDTIME AS NEEDED FOR SLEEP, Disp: 30 tablet, Rfl: 0    meloxicam (Mobic) 15 mg tablet, Take 1 tablet (15 mg total) by mouth daily (Patient not taking: Reported on 9/13/2022), Disp: 30 tablet, Rfl: 1  Allergies   Allergen Reactions    Gadolinium Derivatives Itching     Media Ready-Box (Contrast Dye)        OBJECTIVE    Vitals:   Vitals:    09/13/22 1057   BP: 124/72   Pulse: 96   Temp: 97 8 °F (36 6 °C)   SpO2: 98%   Weight: 102 kg (224 lb)   Height: 5' 10" (1 778 m)     Wt Readings from Last 3 Encounters:   09/13/22 102 kg (224 lb)   08/26/22 101 kg (222 lb 8 oz)   07/19/22 101 kg (222 lb)     Body mass index is 32 14 kg/m²  BP Readings from Last 3 Encounters:   09/13/22 124/72   08/26/22 114/60   06/10/22 110/70     Pulse Readings from Last 3 Encounters:   09/13/22 96   08/26/22 77   06/10/22 89     No LMP for male patient  Physical Exam:    Physical Exam  Vitals and nursing note reviewed  Constitutional:       General: He is not in acute distress  Appearance: He is well-developed  He is not diaphoretic  HENT:      Head: Normocephalic and atraumatic  Right Ear: External ear normal       Left Ear: External ear normal    Eyes:      Conjunctiva/sclera: Conjunctivae normal    Pulmonary:      Effort: Pulmonary effort is normal  No respiratory distress  Skin:     Findings: No rash  Neurological:      Mental Status: He is alert  Cranial Nerves: No cranial nerve deficit  Labs: I have personally reviewed all pertinent results  Imaging:  I have personally reviewed all pertinent results

## 2022-09-13 NOTE — ASSESSMENT & PLAN NOTE
Lab Results   Component Value Date    HGBA1C 7 9 (A) 09/13/2022   Improving (down from 8 3)  Continue metformin 1000mg BID  Would add GLP-1 agonist or similar in the future if still not at goal  S/p foot exam  Obtain eye exam  Declined PNA shot  F/u 3 months and obtain labs prior

## 2022-09-14 ENCOUNTER — TELEPHONE (OUTPATIENT)
Dept: ADMINISTRATIVE | Facility: OTHER | Age: 52
End: 2022-09-14

## 2022-09-14 NOTE — TELEPHONE ENCOUNTER
----- Message from Jeff Corrales sent at 9/13/2022 11:28 AM EDT -----  Regarding: Colonoscopy  09/13/22 11:28 AM    Hello, our patient Floridalma Huang has had CRC: Cologuard completed/performed  Please assist in updating the patient chart by pulling the Care Everywhere (CE) document  The date of service is 10/2021       Thank you,  [address]

## 2022-09-14 NOTE — TELEPHONE ENCOUNTER
Upon review of the In Basket request we were able to locate, review, and update the patient chart as requested for CRC: [place]  Any additional questions or concerns should be emailed to the Practice Liaisons via [email or web address] email, please do not reply via In Basket      Thank you  Gretchen Kat

## 2022-09-22 DIAGNOSIS — F51.01 PRIMARY INSOMNIA: ICD-10-CM

## 2022-09-22 RX ORDER — ZOLPIDEM TARTRATE 10 MG/1
10 TABLET ORAL
Qty: 30 TABLET | Refills: 0 | Status: SHIPPED | OUTPATIENT
Start: 2022-09-22 | End: 2022-10-24

## 2022-09-22 RX ORDER — BACLOFEN 500 UG/ML
INJECTION, SOLUTION INTRATHECAL AS NEEDED
Status: CANCELLED | OUTPATIENT
Start: 2022-09-22

## 2022-09-22 NOTE — TELEPHONE ENCOUNTER
Patient requested baclofen refill, but this usually comes from his pain management doctor so I didn't refill     I did send the Ambien

## 2022-10-19 DIAGNOSIS — E11.65 CONTROLLED TYPE 2 DIABETES MELLITUS WITH HYPERGLYCEMIA, WITHOUT LONG-TERM CURRENT USE OF INSULIN (HCC): ICD-10-CM

## 2022-10-19 RX ORDER — MELOXICAM 15 MG/1
15 TABLET ORAL DAILY
Qty: 30 TABLET | Refills: 1 | Status: SHIPPED | OUTPATIENT
Start: 2022-10-19

## 2022-10-23 DIAGNOSIS — F51.01 PRIMARY INSOMNIA: ICD-10-CM

## 2022-10-24 RX ORDER — ZOLPIDEM TARTRATE 10 MG/1
TABLET ORAL
Qty: 30 TABLET | Refills: 0 | Status: SHIPPED | OUTPATIENT
Start: 2022-10-24

## 2022-10-24 NOTE — TELEPHONE ENCOUNTER
1  [id number] 09/22/2022 09/22/2022 Zolpidem Tartrate (Tablet)  30 0 30 10 MG NA [facility]  Medicare 0 / 0 [place]    1  [id number] 08/26/2022 08/26/2022 HYDROcodone BITARTRATE-ACETAMINOPHE (Tablet)  30 0 30 325 MG-5 MG 5 0 [facility]  Private Pay 0 / 0 [place]    1  [id number] 08/24/2022 08/24/2022 Zolpidem Tartrate (Tablet)  30 0 30 10 MG NA [facility]    Medicare 0 / 0 [place]

## 2022-11-01 ENCOUNTER — TELEPHONE (OUTPATIENT)
Dept: MRI IMAGING | Facility: HOSPITAL | Age: 52
End: 2022-11-01

## 2022-11-01 NOTE — TELEPHONE ENCOUNTER
Removed from [place], [facility]  - [facility] 11/1  Trying to schedule pt since 6/24/22  9/8  l/m for pt to nina  10/31  l/m to nina

## 2022-11-30 DIAGNOSIS — G83.9 SPASTIC PARESIS (HCC): ICD-10-CM

## 2022-11-30 RX ORDER — HYDROCODONE BITARTRATE AND ACETAMINOPHEN 5; 325 MG/1; MG/1
1 TABLET ORAL DAILY PRN
Qty: 30 TABLET | Refills: 0 | Status: SHIPPED | OUTPATIENT
Start: 2022-11-30

## 2022-11-30 NOTE — TELEPHONE ENCOUNTER
1  [id number] 11/25/2022 11/25/2022 Zolpidem Tartrate (Tablet)  30 0 30 10 MG NA [facility]  Medicare 0 / 0 [place]    1  [id number] 10/24/2022  10/24/2022 Zolpidem Tartrate (Tablet)  30 0 30 10 MG NA [facility]  Medicare 0 / 0 [place]    1  [id number] 09/22/2022 09/22/2022 Zolpidem Tartrate (Tablet)  30 0 30 10 MG NA [facility]  Medicare 0 / 0 [place]    1  [id number] 08/26/2022 08/26/2022 HYDROcodone BITARTRATE-ACETAMINOPHE (Tablet)  30 0 30 325 MG-5 MG 5 0 [facility]    Private Pay 0 / 0 [place]

## 2022-12-02 DIAGNOSIS — E11.65 CONTROLLED TYPE 2 DIABETES MELLITUS WITH HYPERGLYCEMIA, WITHOUT LONG-TERM CURRENT USE OF INSULIN (HCC): ICD-10-CM

## 2022-12-12 ENCOUNTER — OFFICE VISIT (OUTPATIENT)
Dept: FAMILY MEDICINE CLINIC | Facility: CLINIC | Age: 52
End: 2022-12-12

## 2022-12-12 VITALS
SYSTOLIC BLOOD PRESSURE: 108 MMHG | WEIGHT: 227 LBS | OXYGEN SATURATION: 91 % | TEMPERATURE: 97.8 F | HEART RATE: 88 BPM | HEIGHT: 70 IN | DIASTOLIC BLOOD PRESSURE: 82 MMHG | BODY MASS INDEX: 32.5 KG/M2

## 2022-12-12 DIAGNOSIS — E11.65 TYPE 2 DIABETES MELLITUS WITH HYPERGLYCEMIA, WITHOUT LONG-TERM CURRENT USE OF INSULIN (HCC): Primary | ICD-10-CM

## 2022-12-12 DIAGNOSIS — F33.1 MODERATE RECURRENT MAJOR DEPRESSION (HCC): ICD-10-CM

## 2022-12-12 PROBLEM — F11.20 CONTINUOUS OPIOID DEPENDENCE (HCC): Status: RESOLVED | Noted: 2022-06-02 | Resolved: 2022-12-12

## 2022-12-12 LAB — SL AMB POCT HEMOGLOBIN AIC: 7.7 (ref ?–6.5)

## 2022-12-12 NOTE — ASSESSMENT & PLAN NOTE
Lab Results   Component Value Date    HGBA1C 7 7 (A) 12/12/2022   improving gradually (8 3>7 9>7 7)  Continue metformin 1000mg BID  We discussed Ozempic, may start next visit if not <7  Eye exam scheduled for this week  S/p foot exam  F/u 3 months

## 2022-12-12 NOTE — PROGRESS NOTES
Name: Brie Dunne      : 1970      MRN: [id number]  Encounter Provider: Kristopher Mendez MD  Encounter Date: 2022   Encounter department: [address]     1  Type 2 diabetes mellitus with hyperglycemia, without long-term current use of insulin ([facility])  Assessment & Plan:    Lab Results   Component Value Date    HGBA1C 7 7 (A) 2022   improving gradually (8 3>7 9>7 7)  Continue metformin 1000mg BID  We discussed Ozempic, may start next visit if not <7  Eye exam scheduled for this week  S/p foot exam  F/u 3 months     Orders:  -     POCT hemoglobin A1c    2  Moderate recurrent major depression ([facility] [address] )    Influenza: Patient was counseled regarding risks and benefits of immunization against influenza virus  he was encouraged to receive vaccination  The patient voiced understands and declines to receive immunization this season  Depression Screening and Follow-up Plan: Patient's depression screening was positive with a PHQ-2 score of 5  Their PHQ-9 score was 10  Patient assessed for underlying major depression  Brief counseling provided and recommend additional follow-up/re-evaluation next office visit  Increase Zoloft to 100mg over the winter, then may lower back to 50mg if desired once days are longer  Subjective      HPI     He is doing about the same as last visit  When he checks his glucose before bed it's usually 130-150, but he doesn't check regularly  He says his spasms/aches are about the same, but they do get a little worse (particularly numbness/dropping items) in the winter  He has depression which may be a bit worse in the winter, too     PHQ-2/9 Depression Screening    Little interest or pleasure in doing things: 3 - nearly every day  Feeling down, depressed, or hopeless: 2 - more than half the days  Trouble falling or staying asleep, or sleeping too much: 0 - not at all  Feeling tired or having little energy: 2 - more than half the days  Poor appetite or overeating: 3 - nearly every day  Feeling bad about yourself - or that you are a failure or have let yourself or your family down: 0 - not at all  Trouble concentrating on things, such as reading the newspaper or watching television: 0 - not at all  Moving or speaking so slowly that other people could have noticed  Or the opposite - being so fidgety or restless that you have been moving around a lot more than usual: 0 - not at all  Thoughts that you would be better off dead, or of hurting yourself in some way: 0 - not at all  PHQ-2 Score: 5  PHQ-2 Interpretation: POSITIVE depression screen  PHQ-9 Score: 10   PHQ-9 Interpretation: Moderate depression            Review of Systems   Constitutional: Negative for activity change, chills and fever  HENT: Negative for congestion, rhinorrhea and sore throat  Eyes: Negative for visual disturbance  Respiratory: Negative for cough, shortness of breath and wheezing  Cardiovascular: Negative for chest pain and palpitations  Gastrointestinal: Negative for abdominal pain, blood in stool, constipation, diarrhea, nausea and vomiting  Genitourinary: Negative for dysuria  Musculoskeletal: Positive for arthralgias, back pain and myalgias  Skin: Negative for rash  Neurological: Negative for dizziness, light-headedness and headaches  All other systems reviewed and are negative  Current Outpatient Medications on File Prior to Visit   Medication Sig   • atorvastatin (LIPITOR) 40 mg tablet Take 40 mg by mouth daily   • baclofen (LIORESAL) 10 MG/20ML by Intrathecal route if needed   • HYDROcodone-acetaminophen (NORCO) 5-325 mg per tablet Take 1 tablet by mouth daily as needed (severe pain) Max Daily Amount: 1 tablet   • ibuprofen (MOTRIN) 600 mg tablet Take 1 tablet by mouth every 8 (eight) hours as needed for moderate pain (pain) for up to 30 days   • meloxicam (MOBIC) 15 mg tablet TAKE 1 TABLET (15 MG TOTAL) BY MOUTH DAILY     • metFORMIN (GLUCOPHAGE) 1000 MG tablet TAKE 1 TABLET BY MOUTH TWICE A DAY WITH MEALS   • sertraline (ZOLOFT) 50 mg tablet Take 1 tablet (50 mg total) by mouth daily   • zolpidem (AMBIEN) 10 mg tablet TAKE 1 TABLET BY MOUTH DAILY AT BEDTIME AS NEEDED FOR SLEEP  • [DISCONTINUED] metFORMIN (GLUCOPHAGE) 500 mg tablet        Objective     /82   Pulse 88   Temp 97 8 °F (36 6 °C)   Ht 5' 10" (1 778 m)   Wt 103 kg (227 lb)   SpO2 91%   BMI 32 57 kg/m²     Physical Exam  Vitals and nursing note reviewed  Constitutional:       General: He is not in acute distress  Appearance: He is well-developed  He is not diaphoretic  HENT:      Head: Normocephalic and atraumatic  Right Ear: External ear normal       Left Ear: External ear normal    Eyes:      Conjunctiva/sclera: Conjunctivae normal    Cardiovascular:      Rate and Rhythm: Normal rate and regular rhythm  Pulses: Normal pulses  Heart sounds: Normal heart sounds  No murmur heard  Pulmonary:      Effort: Pulmonary effort is normal  No respiratory distress  Skin:     Findings: No rash  Neurological:      Mental Status: He is alert  Cranial Nerves: No cranial nerve deficit  Tanya Meraz MD  Depression Screening Follow-up Plan: Patient's depression screening was positive with a PHQ-2 score of 5  Their PHQ-9 score was 10  Patient assessed for underlying major depression  They have no active suicidal ideations  Brief counseling provided and recommend additional follow-up/re-evaluation next office visit

## 2023-02-17 DIAGNOSIS — E11.65 CONTROLLED TYPE 2 DIABETES MELLITUS WITH HYPERGLYCEMIA, WITHOUT LONG-TERM CURRENT USE OF INSULIN (HCC): ICD-10-CM

## 2023-02-17 DIAGNOSIS — G83.9 SPASTIC PARESIS (HCC): ICD-10-CM

## 2023-02-17 RX ORDER — MELOXICAM 15 MG/1
TABLET ORAL
Qty: 30 TABLET | Refills: 1 | Status: SHIPPED | OUTPATIENT
Start: 2023-02-17

## 2023-02-17 RX ORDER — HYDROCODONE BITARTRATE AND ACETAMINOPHEN 5; 325 MG/1; MG/1
1 TABLET ORAL DAILY PRN
Qty: 30 TABLET | Refills: 0 | Status: SHIPPED | OUTPATIENT
Start: 2023-02-17

## 2023-02-17 RX ORDER — BACLOFEN 500 UG/ML
INJECTION, SOLUTION INTRATHECAL AS NEEDED
OUTPATIENT
Start: 2023-02-17

## 2023-02-17 NOTE — TELEPHONE ENCOUNTER
[id number] 01/23/2023 01/23/2023 Zolpidem Tartrate (Tablet)  30 0 30 10 MG NA [facility]  Medicare 0 / 0 PA     1  [id number] 12/23/2022 12/23/2022 Zolpidem Tartrate (Tablet)  30 0 30 10 MG NA [facility]  Medicare 0 / 0 PA    1  [id number] 11/30/2022 11/30/2022 ACETAMINOPHEN 325 MG / HYDROcodone BITARTRATE 5 MG ORAL TABLET (Tablet)  30 0 30 5 0 MG/325 0 MG 5 0 [facility]  Private Pay 0 / 0 PA    1  [id number] 11/25/2022 11/25/2022 Zolpidem Tartrate (Tablet)  30 0 30 10 MG NA [facility]  Medicare 0 / 0 PA    1  [id number] 10/24/2022  10/24/2022 Zolpidem Tartrate (Tablet)  30 0 30 10 MG NA [facility]    Medicare 0 / 0

## 2023-02-21 DIAGNOSIS — F51.01 PRIMARY INSOMNIA: ICD-10-CM

## 2023-02-21 RX ORDER — ZOLPIDEM TARTRATE 10 MG/1
TABLET ORAL
Qty: 30 TABLET | Refills: 0 | Status: SHIPPED | OUTPATIENT
Start: 2023-02-21

## 2023-02-21 NOTE — TELEPHONE ENCOUNTER
[id number] 01/23/2023 01/23/2023 Zolpidem Tartrate (Tablet)  30 0 30 10 MG NA [facility]  Medicare 0 / 0 PA     1  [id number] 12/23/2022 12/23/2022 Zolpidem Tartrate (Tablet)  30 0 30 10 MG NA [facility]  Medicare 0 / 0 PA    1  [id number] 11/30/2022 11/30/2022 ACETAMINOPHEN 325 MG / HYDROcodone BITARTRATE 5 MG ORAL TABLET (Tablet)  30 0 30 5 0 MG/325 0 MG 5 0 [facility]  Private Pay 0 / 0 PA    1  [id number] 11/25/2022 11/25/2022 Zolpidem Tartrate (Tablet)  30 0 30 10 MG NA [facility]  Medicare 0 / 0 PA    1  [id number] 10/24/2022  10/24/2022 Zolpidem Tartrate (Tablet)  30 0 30 10 MG NA [facility]    Medicare 0 / 0

## 2023-03-07 ENCOUNTER — RA CDI HCC (OUTPATIENT)
Dept: OTHER | Facility: HOSPITAL | Age: 53
End: 2023-03-07

## 2023-03-07 NOTE — PROGRESS NOTES
[facility]  coding opportunities       Chart reviewed, no opportunity found:   [address]        Patients Insurance     Medicare Insurance: [employer]

## 2023-03-13 ENCOUNTER — OFFICE VISIT (OUTPATIENT)
Dept: FAMILY MEDICINE CLINIC | Facility: CLINIC | Age: 53
End: 2023-03-13

## 2023-03-13 VITALS
WEIGHT: 224 LBS | BODY MASS INDEX: 32.07 KG/M2 | SYSTOLIC BLOOD PRESSURE: 120 MMHG | TEMPERATURE: 98.4 F | HEART RATE: 90 BPM | DIASTOLIC BLOOD PRESSURE: 84 MMHG | OXYGEN SATURATION: 94 % | HEIGHT: 70 IN

## 2023-03-13 DIAGNOSIS — Z11.59 ENCOUNTER FOR HEPATITIS C SCREENING TEST FOR LOW RISK PATIENT: ICD-10-CM

## 2023-03-13 DIAGNOSIS — E11.65 TYPE 2 DIABETES MELLITUS WITH HYPERGLYCEMIA, WITHOUT LONG-TERM CURRENT USE OF INSULIN (HCC): Primary | ICD-10-CM

## 2023-03-13 DIAGNOSIS — F33.1 MODERATE RECURRENT MAJOR DEPRESSION (HCC): ICD-10-CM

## 2023-03-13 DIAGNOSIS — S24.109S: ICD-10-CM

## 2023-03-13 DIAGNOSIS — Z12.5 PROSTATE CANCER SCREENING: ICD-10-CM

## 2023-03-13 DIAGNOSIS — G83.9 SPASTIC PARESIS (HCC): ICD-10-CM

## 2023-03-13 DIAGNOSIS — J96.10 CHRONIC RESPIRATORY FAILURE, UNSPECIFIED WHETHER WITH HYPOXIA OR HYPERCAPNIA (HCC): ICD-10-CM

## 2023-03-13 LAB — SL AMB POCT HEMOGLOBIN AIC: 7.7 (ref ?–6.5)

## 2023-03-13 RX ORDER — BACLOFEN 10 MG/1
10 TABLET ORAL 3 TIMES DAILY
Qty: 90 TABLET | Refills: 1 | Status: SHIPPED | OUTPATIENT
Start: 2023-03-13

## 2023-03-13 NOTE — ASSESSMENT & PLAN NOTE
Lab Results   Component Value Date    HGBA1C 7 7 (A) 03/13/2023   Stable, above goal  Continue metformin 1000mg BID  Add Ozempic 0 25mg q week for 4 weeks then increase to 0 5 q week  Recommend eye exam   F/u 3 months

## 2023-03-13 NOTE — PROGRESS NOTES
Name: Pilar Major      : 1970      MRN: [id number]  Encounter Provider: Kavon Foster MD  Encounter Date: 3/13/2023   Encounter department: [address] [place] [address] Road     1  Type 2 diabetes mellitus with hyperglycemia, without long-term current use of insulin (HCC)  Assessment & Plan:    Lab Results   Component Value Date    HGBA1C 7 7 (A) 2023   Stable, above goal  Continue metformin 1000mg BID  Add Ozempic 0 25mg q week for 4 weeks then increase to 0 5 q week  Recommend eye exam   F/u 3 months     Orders:  -     POCT hemoglobin A1c  -     Lipid Panel with Direct LDL reflex; Future  -     Comprehensive metabolic panel; Future  -     CBC; Future; Expected date: 2023  -     Microalbumin / creatinine urine ratio; Future; Expected date: 2023  -     UA (URINE) with reflex to Scope; Future  -     TSH, 3rd generation with Free T4 reflex; Future; Expected date: 2023  -     semaglutide, 0 25 or 0 5 mg/dose, (Ozempic) 2 mg/1 5 mL injection pen; Inject 0 19 mL (0 25 mg total) under the skin every 7 days for 30 days, THEN 0 38 mL (0 5 mg total) every 7 days  2  Moderate recurrent major depression (HCC)  Assessment & Plan:  Chronic, stable  Continue Zoloft 50mg qd       3  Thoracic cord injury without spinal bone injury, sequela ([facility] )  Assessment & Plan:  Chronic, stable  Continue to monitor    Orders:  -     baclofen 10 mg tablet; Take 1 tablet (10 mg total) by mouth 3 (three) times a day    4  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HCC)  Assessment & Plan:  Chronic, stable  Managed by pain management with baclofen pump  Requests refill of po baclofen as backup    Orders:  -     baclofen 10 mg tablet; Take 1 tablet (10 mg total) by mouth 3 (three) times a day    5  Chronic respiratory failure, unspecified whether with hypoxia or hypercapnia (HCC)  Assessment & Plan:  Chronic, stable  Continue to monitor      6   Encounter for hepatitis C screening test for low risk patient  - Hepatitis C antibody; Future    7  Prostate cancer screening  -     PSA, Total Screen; Future           Subjective      HPI   Patient is doing well overall  He is working on [employer]  He is compliant with medication  He had been on jardiance and Sanjana Siemens in the past but they were too expensive  He is open to taking a new medication  He went to the eye doctor shortly after our last visit - they unfortunately didn't take his insurance so he still hasn't had a diabetic eye exam      Review of Systems   Constitutional: Negative for activity change, chills and fever  HENT: Negative for congestion, rhinorrhea and sore throat  Eyes: Negative for visual disturbance  Respiratory: Negative for cough, shortness of breath and wheezing  Cardiovascular: Negative for chest pain and palpitations  Gastrointestinal: Negative for abdominal pain, blood in stool, constipation, diarrhea, nausea and vomiting  Genitourinary: Negative for dysuria  Musculoskeletal: Positive for arthralgias, back pain and myalgias  Skin: Negative for rash  Neurological: Negative for dizziness, light-headedness and headaches  All other systems reviewed and are negative        Current Outpatient Medications on File Prior to Visit   Medication Sig   • atorvastatin (LIPITOR) 40 mg tablet Take 40 mg by mouth daily   • baclofen (LIORESAL) 10 MG/20ML by Intrathecal route if needed   • HYDROcodone-acetaminophen (NORCO) 5-325 mg per tablet Take 1 tablet by mouth daily as needed (severe pain) Max Daily Amount: 1 tablet   • ibuprofen (MOTRIN) 600 mg tablet Take 1 tablet by mouth every 8 (eight) hours as needed for moderate pain (pain) for up to 30 days   • meloxicam (MOBIC) 15 mg tablet TAKE 1 TABLET BY MOUTH EVERY DAY   • metFORMIN (GLUCOPHAGE) 1000 MG tablet TAKE 1 TABLET BY MOUTH TWICE A DAY WITH MEALS   • sertraline (ZOLOFT) 50 mg tablet Take 1 tablet (50 mg total) by mouth daily   • zolpidem (AMBIEN) 10 mg tablet TAKE 1 TABLET BY MOUTH DAILY AT BEDTIME AS NEEDED FOR SLEEP  Objective     /84   Pulse 90   Temp 98 4 °F (36 9 °C)   Ht 5' 10" (1 778 m)   Wt 102 kg (224 lb)   SpO2 94%   BMI 32 14 kg/m²     Physical Exam  Vitals and nursing note reviewed  Constitutional:       General: He is not in acute distress  Appearance: He is well-developed  He is not diaphoretic  HENT:      Head: Normocephalic and atraumatic  Right Ear: External ear normal       Left Ear: External ear normal    Eyes:      Conjunctiva/sclera: Conjunctivae normal    Pulmonary:      Effort: Pulmonary effort is normal  No respiratory distress  Skin:     Findings: No rash  Neurological:      Mental Status: He is alert  Cranial Nerves: No cranial nerve deficit         Julieta Harrington MD

## 2023-03-13 NOTE — ASSESSMENT & PLAN NOTE
Chronic, stable  Managed by pain management with baclofen pump  Requests refill of po baclofen as backup

## 2023-03-27 ENCOUNTER — TELEPHONE (OUTPATIENT)
Dept: FAMILY MEDICINE CLINIC | Facility: CLINIC | Age: 53
End: 2023-03-27

## 2023-03-27 DIAGNOSIS — G83.9 SPASTIC PARESIS (HCC): ICD-10-CM

## 2023-03-27 DIAGNOSIS — F51.01 PRIMARY INSOMNIA: ICD-10-CM

## 2023-03-27 RX ORDER — ZOLPIDEM TARTRATE 10 MG/1
10 TABLET ORAL
Qty: 30 TABLET | Refills: 0 | Status: SHIPPED | OUTPATIENT
Start: 2023-03-27

## 2023-03-27 RX ORDER — HYDROCODONE BITARTRATE AND ACETAMINOPHEN 5; 325 MG/1; MG/1
1 TABLET ORAL DAILY PRN
Qty: 30 TABLET | Refills: 0 | Status: SHIPPED | OUTPATIENT
Start: 2023-03-27

## 2023-03-27 NOTE — TELEPHONE ENCOUNTER
"Patient called stating that \"please tell her that the diabetic medicine is affordable\"  He did not wish to elaborate  Patient also requesting refills on medications Zolpidem and Hydrocodone-acetaminophen to be sent to [facility]     "

## 2023-04-23 DIAGNOSIS — E11.65 CONTROLLED TYPE 2 DIABETES MELLITUS WITH HYPERGLYCEMIA, WITHOUT LONG-TERM CURRENT USE OF INSULIN (HCC): ICD-10-CM

## 2023-04-24 RX ORDER — MELOXICAM 15 MG/1
TABLET ORAL
Qty: 30 TABLET | Refills: 1 | Status: SHIPPED | OUTPATIENT
Start: 2023-04-24

## 2023-04-26 DIAGNOSIS — F51.01 PRIMARY INSOMNIA: ICD-10-CM

## 2023-04-26 RX ORDER — ZOLPIDEM TARTRATE 10 MG/1
10 TABLET ORAL
Qty: 30 TABLET | Refills: 0 | Status: SHIPPED | OUTPATIENT
Start: 2023-04-26 | End: 2023-05-26 | Stop reason: SDUPTHER

## 2023-04-26 NOTE — TELEPHONE ENCOUNTER
[id number] 04/05/2023 03/27/2023 ACETAMINOPHEN 325 MG / HYDROcodone BITARTRATE 5 MG ORAL TABLET (Tablet)  30.0 30 5.0 MG/325.0 MG NA [facility]  Private [facility] 0 / 0 PA    1  [id number] 03/27/2023 03/27/2023 Zolpidem Tartrate (Tablet)  30.0 30 10 MG NA [facility]  Medicare 0 / 0 PA    1  [id number] 02/21/2023 02/21/2023 Zolpidem Tartrate (Tablet)  30.0 30 10 MG [facility]  Medicare 0 / 0 PA    1  [id number] 01/23/2023 01/23/2023 Zolpidem Tartrate (Tablet)  30.0 30 10 MG NA RISHABH JOAQUIN  [facility]

## 2023-05-25 DIAGNOSIS — E11.65 CONTROLLED TYPE 2 DIABETES MELLITUS WITH HYPERGLYCEMIA, WITHOUT LONG-TERM CURRENT USE OF INSULIN (HCC): ICD-10-CM

## 2023-05-26 DIAGNOSIS — F51.01 PRIMARY INSOMNIA: ICD-10-CM

## 2023-05-26 RX ORDER — ZOLPIDEM TARTRATE 10 MG/1
10 TABLET ORAL
Qty: 30 TABLET | Refills: 0 | Status: SHIPPED | OUTPATIENT
Start: 2023-05-26

## 2023-06-01 ENCOUNTER — APPOINTMENT (OUTPATIENT)
Dept: LAB | Facility: CLINIC | Age: 53
End: 2023-06-01
Payer: COMMERCIAL

## 2023-06-01 DIAGNOSIS — Z12.5 PROSTATE CANCER SCREENING: ICD-10-CM

## 2023-06-01 DIAGNOSIS — E11.65 TYPE 2 DIABETES MELLITUS WITH HYPERGLYCEMIA, WITHOUT LONG-TERM CURRENT USE OF INSULIN (HCC): ICD-10-CM

## 2023-06-01 DIAGNOSIS — Z11.59 ENCOUNTER FOR HEPATITIS C SCREENING TEST FOR LOW RISK PATIENT: ICD-10-CM

## 2023-06-01 LAB
ALBUMIN SERPL BCP-MCNC: 3.9 G/DL (ref 3.5–5)
ALP SERPL-CCNC: 128 U/L (ref 46–116)
ALT SERPL W P-5'-P-CCNC: 41 U/L (ref 12–78)
ANION GAP SERPL CALCULATED.3IONS-SCNC: 2 MMOL/L (ref 4–13)
AST SERPL W P-5'-P-CCNC: 14 U/L (ref 5–45)
BILIRUB SERPL-MCNC: 0.5 MG/DL (ref 0.2–1)
BUN SERPL-MCNC: 20 MG/DL (ref 5–25)
CALCIUM SERPL-MCNC: 9.4 MG/DL (ref 8.3–10.1)
CHLORIDE SERPL-SCNC: 101 MMOL/L (ref 96–108)
CHOLEST SERPL-MCNC: 188 MG/DL
CO2 SERPL-SCNC: 33 MMOL/L (ref 21–32)
CREAT SERPL-MCNC: 0.93 MG/DL (ref 0.6–1.3)
ERYTHROCYTE [DISTWIDTH] IN BLOOD BY AUTOMATED COUNT: 12.5 % (ref 11.6–15.1)
GFR SERPL CREATININE-BSD FRML MDRD: 92 ML/MIN/1.73SQ M
GLUCOSE P FAST SERPL-MCNC: 174 MG/DL (ref 65–99)
HCT VFR BLD AUTO: 49.6 % (ref 36.5–49.3)
HCV AB SER QL: NORMAL
HDLC SERPL-MCNC: 58 MG/DL
HGB BLD-MCNC: 15.7 G/DL (ref 12–17)
LDLC SERPL CALC-MCNC: 104 MG/DL (ref 0–100)
MCH RBC QN AUTO: 28.6 PG (ref 26.8–34.3)
MCHC RBC AUTO-ENTMCNC: 31.7 G/DL (ref 31.4–37.4)
MCV RBC AUTO: 91 FL (ref 82–98)
PLATELET # BLD AUTO: 225 THOUSANDS/UL (ref 149–390)
PMV BLD AUTO: 9.6 FL (ref 8.9–12.7)
POTASSIUM SERPL-SCNC: 4.4 MMOL/L (ref 3.5–5.3)
PROT SERPL-MCNC: 7.8 G/DL (ref 6.4–8.4)
PSA SERPL-MCNC: 2.5 NG/ML (ref 0–4)
RBC # BLD AUTO: 5.48 MILLION/UL (ref 3.88–5.62)
SODIUM SERPL-SCNC: 136 MMOL/L (ref 135–147)
TRIGL SERPL-MCNC: 131 MG/DL
TSH SERPL DL<=0.05 MIU/L-ACNC: 2.71 UIU/ML (ref 0.45–4.5)
WBC # BLD AUTO: 5.48 THOUSAND/UL (ref 4.31–10.16)

## 2023-06-01 PROCEDURE — 80053 COMPREHEN METABOLIC PANEL: CPT

## 2023-06-01 PROCEDURE — 84443 ASSAY THYROID STIM HORMONE: CPT

## 2023-06-01 PROCEDURE — 80061 LIPID PANEL: CPT

## 2023-06-01 PROCEDURE — 86803 HEPATITIS C AB TEST: CPT

## 2023-06-01 PROCEDURE — 36415 COLL VENOUS BLD VENIPUNCTURE: CPT

## 2023-06-01 PROCEDURE — 85027 COMPLETE CBC AUTOMATED: CPT

## 2023-06-01 PROCEDURE — G0103 PSA SCREENING: HCPCS

## 2023-06-06 ENCOUNTER — OFFICE VISIT (OUTPATIENT)
Dept: FAMILY MEDICINE CLINIC | Facility: CLINIC | Age: 53
End: 2023-06-06
Payer: COMMERCIAL

## 2023-06-06 VITALS
HEART RATE: 102 BPM | SYSTOLIC BLOOD PRESSURE: 136 MMHG | DIASTOLIC BLOOD PRESSURE: 78 MMHG | BODY MASS INDEX: 32.21 KG/M2 | WEIGHT: 225 LBS | HEIGHT: 70 IN | OXYGEN SATURATION: 96 % | TEMPERATURE: 97.7 F

## 2023-06-06 DIAGNOSIS — D36.7 DERMOID CYST OF LEFT LOWER EXTREMITY: ICD-10-CM

## 2023-06-06 DIAGNOSIS — Z00.00 MEDICARE ANNUAL WELLNESS VISIT, SUBSEQUENT: Primary | ICD-10-CM

## 2023-06-06 DIAGNOSIS — E11.65 TYPE 2 DIABETES MELLITUS WITH HYPERGLYCEMIA, WITHOUT LONG-TERM CURRENT USE OF INSULIN (HCC): ICD-10-CM

## 2023-06-06 PROCEDURE — 99214 OFFICE O/P EST MOD 30 MIN: CPT | Performed by: FAMILY MEDICINE

## 2023-06-06 PROCEDURE — G0439 PPPS, SUBSEQ VISIT: HCPCS | Performed by: FAMILY MEDICINE

## 2023-06-06 RX ORDER — DULAGLUTIDE 0.75 MG/.5ML
0.75 INJECTION, SOLUTION SUBCUTANEOUS
Qty: 2 ML | Refills: 0 | Status: SHIPPED | OUTPATIENT
Start: 2023-06-06 | End: 2023-07-06

## 2023-06-06 NOTE — PATIENT INSTRUCTIONS
Medicare Preventive Visit Patient Instructions  Thank you for completing your Welcome to Medicare Visit or Medicare Annual Wellness Visit today  Your next wellness visit will be due in one year (6/6/2024)  The screening/preventive services that you may require over the next 5-10 years are detailed below  Some tests may not apply to you based off risk factors and/or age  Screening tests ordered at today's visit but not completed yet may show as past due  Also, please note that scanned in results may not display below  Preventive Screenings:  Service Recommendations Previous Testing/Comments   Colorectal Cancer Screening  · Colonoscopy    · Fecal Occult Blood Test (FOBT)/Fecal Immunochemical Test (FIT)  · Fecal DNA/Cologuard Test  · Flexible Sigmoidoscopy Age: 39-70 years old   Colonoscopy: every 10 years (May be performed more frequently if at higher risk)  OR  FOBT/FIT: every 1 year  OR  Cologuard: every 3 years  OR  Sigmoidoscopy: every 5 years  Screening may be recommended earlier than age 39 if at higher risk for colorectal cancer  Also, an individualized decision between you and your healthcare provider will decide whether screening between the ages of 74-80 would be appropriate   Colonoscopy: 10/20/2021  FOBT/FIT: Not on file  Cologuard: 10/20/2021  Sigmoidoscopy: Not on file    Screening Current     Prostate Cancer Screening Individualized decision between patient and health care provider in men between ages of 53-78   Medicare will cover every 12 months beginning on the day after your 50th birthday PSA: 2 50 ng/mL     Screening Current     Hepatitis C Screening Once for adults born between 1945 and 1965  More frequently in patients at high risk for Hepatitis C Hep C Antibody: 06/01/2023    Screening Current   Diabetes Screening 1-2 times per year if you're at risk for diabetes or have pre-diabetes Fasting glucose: 174 mg/dL (6/1/2023)  A1C: 7 7 (3/13/2023)  Screening Not Indicated  History Diabetes Cholesterol Screening Once every 5 years if you don't have a lipid disorder  May order more often based on risk factors  Lipid panel: 06/01/2023  Screening Current      Other Preventive Screenings Covered by Medicare:  1  Abdominal Aortic Aneurysm (AAA) Screening: covered once if your at risk  You're considered to be at risk if you have a family history of AAA or a male between the age of 73-68 who smoking at least 100 cigarettes in your lifetime  2  Lung Cancer Screening: covers low dose CT scan once per year if you meet all of the following conditions: (1) Age 50-69; (2) No signs or symptoms of lung cancer; (3) Current smoker or have quit smoking within the last 15 years; (4) You have a tobacco smoking history of at least 20 pack years (packs per day x number of years you smoked); (5) You get a written order from a healthcare provider  3  Glaucoma Screening: covered annually if you're considered high risk: (1) You have diabetes OR (2) Family history of glaucoma OR (3)  aged 48 and older OR (3)  American aged 72 and older  3  Osteoporosis Screening: covered every 2 years if you meet one of the following conditions: (1) Have a vertebral abnormality; (2) On glucocorticoid therapy for more than 3 months; (3) Have primary hyperparathyroidism; (4) On osteoporosis medications and need to assess response to drug therapy  5  HIV Screening: covered annually if you're between the age of 12-76  Also covered annually if you are younger than 13 and older than 72 with risk factors for HIV infection  For pregnant patients, it is covered up to 3 times per pregnancy      Immunizations:  Immunization Recommendations   Influenza Vaccine Annual influenza vaccination during flu season is recommended for all persons aged >= 6 months who do not have contraindications   Pneumococcal Vaccine   * Pneumococcal conjugate vaccine = PCV13 (Prevnar 13), PCV15 (Vaxneuvance), PCV20 (Prevnar 20)  * Pneumococcal polysaccharide vaccine = PPSV23 (Pneumovax) Adults 2364 years old: 1-3 doses may be recommended based on certain risk factors  Adults 72 years old: 1-2 doses may be recommended based off what pneumonia vaccine you previously received   Hepatitis B Vaccine 3 dose series if at intermediate or high risk (ex: diabetes, end stage renal disease, liver disease)   Tetanus (Td) Vaccine - COST NOT COVERED BY MEDICARE PART B Following completion of primary series, a booster dose should be given every 10 years to maintain immunity against tetanus  Td may also be given as tetanus wound prophylaxis  Tdap Vaccine - COST NOT COVERED BY MEDICARE PART B Recommended at least once for all adults  For pregnant patients, recommended with each pregnancy  Shingles Vaccine (Shingrix) - COST NOT COVERED BY MEDICARE PART B  2 shot series recommended in those aged 48 and above     Health Maintenance Due:      Topic Date Due   • HIV Screening  Never done   • Colorectal Cancer Screening  10/20/2024   • Hepatitis C Screening  Completed     Immunizations Due:      Topic Date Due   • COVID-19 Vaccine (1) Never done   • Influenza Vaccine (Season Ended) 09/01/2023     Advance Directives   What are advance directives? Advance directives are legal documents that state your wishes and plans for medical care  These plans are made ahead of time in case you lose your ability to make decisions for yourself  Advance directives can apply to any medical decision, such as the treatments you want, and if you want to donate organs  What are the types of advance directives? There are many types of advance directives, and each state has rules about how to use them  You may choose a combination of any of the following:  · Living will: This is a written record of the treatment you want  You can also choose which treatments you do not want, which to limit, and which to stop at a certain time  This includes surgery, medicine, IV fluid, and tube feedings  · Durable power of  for healthcare [facility]): This is a written record that states who you want to make healthcare choices for you when you are unable to make them for yourself  This person, called a proxy, is usually a family member or a friend  You may choose more than 1 proxy  · Do not resuscitate (DNR) order:  A DNR order is used in case your heart stops beating or you stop breathing  It is a request not to have certain forms of treatment, such as CPR  A DNR order may be included in other types of advance directives  · Medical directive: This covers the care that you want if you are in a coma, near death, or unable to make decisions for yourself  You can list the treatments you want for each condition  Treatment may include pain medicine, surgery, blood transfusions, dialysis, IV or tube feedings, and a ventilator (breathing machine)  · Values history: This document has questions about your views, beliefs, and how you feel and think about life  This information can help others choose the care that you would choose  Why are advance directives important? An advance directive helps you control your care  Although spoken wishes may be used, it is better to have your wishes written down  Spoken wishes can be misunderstood, or not followed  Treatments may be given even if you do not want them  An advance directive may make it easier for your family to make difficult choices about your care  Weight Management   Why it is important to manage your weight:  Being overweight increases your risk of health conditions such as heart disease, high blood pressure, type 2 diabetes, and certain types of cancer  It can also increase your risk for osteoarthritis, sleep apnea, and other respiratory problems  Aim for a slow, steady weight loss  Even a small amount of weight loss can lower your risk of health problems    How to lose weight safely:  A safe and healthy way to lose weight is to eat fewer calories and get regular exercise  You can lose up about 1 pound a week by decreasing the number of calories you eat by 500 calories each day  Healthy meal plan for weight management:  A healthy meal plan includes a variety of foods, contains fewer calories, and helps you stay healthy  A healthy meal plan includes the following:  · Eat whole-grain foods more often  A healthy meal plan should contain fiber  Fiber is the part of grains, fruits, and vegetables that is not broken down by your body  Whole-grain foods are healthy and provide extra fiber in your diet  Some examples of whole-grain foods are whole-wheat breads and pastas, oatmeal, brown rice, and bulgur  · Eat a variety of vegetables every day  Include dark, leafy greens such as spinach, kale, jay greens, and mustard greens  Eat yellow and orange vegetables such as carrots, sweet potatoes, and winter squash  · Eat a variety of fruits every day  Choose fresh or canned fruit (canned in its own juice or light syrup) instead of juice  Fruit juice has very little or no fiber  · Eat low-fat dairy foods  Drink fat-free (skim) milk or 1% milk  Eat fat-free yogurt and low-fat cottage cheese  Try low-fat cheeses such as mozzarella and other reduced-fat cheeses  · Choose meat and other protein foods that are low in fat  Choose beans or other legumes such as split peas or lentils  Choose fish, skinless poultry (chicken or turkey), or lean cuts of red meat (beef or pork)  Before you cook meat or poultry, cut off any visible fat  · Use less fat and oil  Try baking foods instead of frying them  Add less fat, such as margarine, sour cream, regular salad dressing and mayonnaise to foods  Eat fewer high-fat foods  Some examples of high-fat foods include french fries, doughnuts, ice cream, and cakes  · Eat fewer sweets  Limit foods and drinks that are high in sugar  This includes candy, cookies, regular soda, and sweetened drinks    Exercise:  Exercise at least 30 minutes per day on most days of the week  Some examples of exercise include walking, biking, dancing, and swimming  You can also fit in more physical activity by taking the stairs instead of the elevator or parking farther away from stores  Ask your healthcare provider about the best exercise plan for you  Narcotic (Opioid) Safety    Use narcotics safely:  · Take prescribed narcotics exactly as directed  · Do not give narcotics to others or take narcotics that belong to someone else  · Do not mix narcotics without medicines or alcohol  · Do not drive or operate heavy machinery after you take the narcotic  · Monitor for side effects and notify your healthcare provider if you experienced side effects such as nausea, sleepiness, itching, or trouble thinking clearly  Manage constipation:    Constipation is the most common side effect of narcotic medicine  Constipation is when you have hard, dry bowel movements, or you go longer than usual between bowel movements  Tell your healthcare provider about all changes in your bowel movements while you are taking narcotics  He or she may recommend laxative medicine to help you have a bowel movement  He or she may also change the kind of narcotic you are taking, or change when you take it  The following are more ways you can prevent or relieve constipation:    · Drink liquids as directed  You may need to drink extra liquids to help soften and move your bowels  Ask how much liquid to drink each day and which liquids are best for you  · Eat high-fiber foods  This may help decrease constipation by adding bulk to your bowel movements  High-fiber foods include fruits, vegetables, whole-grain breads and cereals, and beans  Your healthcare provider or dietitian can help you create a high-fiber meal plan  Your provider may also recommend a fiber supplement if you cannot get enough fiber from food  · Exercise regularly  Regular physical activity can help stimulate your intestines  Walking is a good exercise to prevent or relieve constipation  Ask which exercises are best for you  · Schedule a time each day to have a bowel movement  This may help train your body to have regular bowel movements  Bend forward while you are on the toilet to help move the bowel movement out  Sit on the toilet for at least 10 minutes, even if you do not have a bowel movement  Store narcotics safely:   · Store narcotics where others cannot easily get them  Keep them in a locked cabinet or secure area  Do not  keep them in a purse or other bag you carry with you  A person may be looking for something else and find the narcotics  · Make sure narcotics are stored out of the reach of children  A child can easily overdose on narcotics  Narcotics may look like candy to a small child  The best way to dispose of narcotics: The laws vary by country and area  In the [place], the best way is to return the narcotics through a take-back program  This program is offered by the [employer]  The following are options for using the program:  · Take the narcotics to a VLAD collection site  The site is often a law enforcement center  Call your local law enforcement center for scheduled take-back days in your area  You will be given information on where to go if the collection site is in a different location  · Take the narcotics to an approved pharmacy or hospital   A pharmacy or hospital may be set up as a collection site  You will need to ask if it is a VLAD collection site if you were not directed there  A pharmacy or doctor's office may not be able to take back narcotics unless it is a VLAD site  · Use a mail-back system  This means you are given containers to put the narcotics into  You will then mail them in the containers  · Use a take-back drop box  This is a place to leave the narcotics at any time  People and animals will not be able to get into the box   Your local law enforcement agency can tell you where to find a drop box in your area  Other ways to manage pain:   · Ask your healthcare provider about non-narcotic medicines to control pain  Nonprescription medicines include NSAIDs (such as ibuprofen) and acetaminophen  Prescription medicines include muscle relaxers, antidepressants, and steroids  · Pain may be managed without any medicines  Some ways to relieve pain include massage, aromatherapy, or meditation  Physical or occupational therapy may also help  For more information:   · Drug Enforcement Administration  [facility] [address]  Phone: 7- 636 - 022-6939  Web Address: [facility]/drug_disposal/    · [address]  and Drug Administration  [facility] , [address] [facility]  Phone: 8- 746 - 181-1057  Web Address: http://[employer]/     © Copyright [address] 2018 Information is for End User's use only and may not be sold, redistributed or otherwise used for commercial purposes   All illustrations and images included in CareNotes® are the copyrighted property of A D A M , Inc  or [address] [place] [address]

## 2023-06-06 NOTE — ASSESSMENT & PLAN NOTE
Lab Results   Component Value Date    HGBA1C 7 7 (A) 03/13/2023   Stop Ozempic due to headaches side effect, start Trulicity 2 72MV   Assuming it's tolerated, he will reach out after 1 month for dose increases  F/u 3 months with Akmila and POC A1c  F/u 6 months with DM labs with me

## 2023-06-06 NOTE — PROGRESS NOTES
Assessment and Plan:     Problem List Items Addressed This Visit        Endocrine    Type 2 diabetes mellitus with hyperglycemia, without long-term current use of insulin ([facility] [address] )       Lab Results   Component Value Date    HGBA1C 7 7 (A) 03/13/2023   Stop Ozempic due to headaches side effect, start Trulicity 7 91GG   Assuming it's tolerated, he will reach out after 1 month for dose increases  F/u 3 months with Kamila and POC A1c  F/u 6 months with DM labs with me          Relevant Medications    dulaglutide (Trulicity) 2 77 UR/8 0KG injection    Other Relevant Orders    Lipid Panel with Direct LDL reflex    Hemoglobin A1C    Comprehensive metabolic panel    CBC    Albumin / creatinine urine ratio    UA (URINE) with reflex to Scope    TSH, 3rd generation with Free T4 reflex   Other Visit Diagnoses     Medicare annual wellness visit, subsequent    -  Primary    Dermoid cyst of left lower extremity        Relevant Orders    Ambulatory referral to General Surgery    BMI 31 0-31 9,adult            BMI Counseling: Body mass index is 32 28 kg/m²  The BMI is above normal  Nutrition recommendations include decreasing portion sizes, encouraging healthy choices of fruits and vegetables and limiting drinks that contain sugar  Exercise recommendations include moderate physical activity 150 minutes/week, exercising 3-5 times per week and strength training exercises  Patient referred to PCP  Rationale for BMI follow-up plan is due to patient being overweight or obese  Preventive health issues were discussed with patient, and age appropriate screening tests were ordered as noted in patient's After Visit Summary  Personalized health advice and appropriate referrals for health education or preventive services given if needed, as noted in patient's After Visit Summary       History of Present Illness:     Patient presents for a Medicare Wellness Visit    Patient did receive and start Ozempic, however self-discontinued after 3 doses as he noted headaches that started a few days after his first dose, that did not remit  He also reports a lump that is sometimes irritated or tender at the superior point of his scar over the left knee, would like removed  Patient Care Team:  Yokasta Arreola MD as PCP - General (Family Medicine)  Roman Moss DO     Review of Systems:     Review of Systems   Constitutional: Negative for activity change, chills and fever  HENT: Negative for congestion, rhinorrhea and sore throat  Eyes: Negative for visual disturbance  Respiratory: Negative for cough, shortness of breath and wheezing  Cardiovascular: Negative for chest pain and palpitations  Gastrointestinal: Negative for abdominal pain, blood in stool, constipation, diarrhea, nausea and vomiting  Genitourinary: Negative for dysuria  Musculoskeletal: Negative for arthralgias and myalgias  Skin: Negative for rash  Neurological: Negative for dizziness, weakness and headaches  All other systems reviewed and are negative         Problem List:     Patient Active Problem List   Diagnosis   • Type 2 diabetes mellitus with hyperglycemia, without long-term current use of insulin ([facility])   • Thoracic cord [facility] without [facility]   • 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[facility])   • DEONTE (obstructive sleep apnea)   • Cervical radiculopathy   • Chronic pain disorder   • Chronic respiratory failure ([facility])   • Elevated hemidiaphragm   • Presence of intrathecal baclofen pump   • Restrictive lung disease   • Moderate recurrent major depression ([facility] [address] )      Past Medical and Surgical History:     Past Medical History:   Diagnosis Date   • Diabetes mellitus ([facility]      Past Surgical History:   Procedure Laterality Date   • BACK SURGERY     • KNEE SURGERY Left    • VENTRAL HERNIA REPAIR        Family History:     Family History   Problem Relation Age of Onset   • Diabetes Mother    • Lung cancer Father    • Skin cancer Father    • Prostate cancer Father • Colon cancer Neg Hx    • Hypertension Neg Hx    • Hyperlipidemia Neg Hx    • Thyroid disease Neg Hx       Social History:     Social History     Socioeconomic History   • Marital status:      Spouse name: None   • Number of children: None   • Years of education: None   • Highest education level: None   Occupational History   • None   Tobacco Use   • Smoking status: Never     Passive exposure: Never   • Smokeless tobacco: Never   Vaping Use   • Vaping Use: Never used   Substance and Sexual Activity   • Alcohol use: No   • Drug use: No   • Sexual activity: None   Other Topics Concern   • None   Social History Narrative   • None     Social Determinants of Health     Financial Resource Strain: Low Risk  (6/6/2023)    Overall Financial Resource Strain (CARDIA)    • Difficulty of Paying Living Expenses: Not very hard   Food Insecurity: Not on file   Transportation Needs: No Transportation Needs (6/6/2023)    PRAPARE - Transportation    • Lack of Transportation (Medical): No    • Lack of Transportation (Non-Medical):  No   Physical Activity: Not on file   Stress: Not on file   Social Connections: Not on file   Intimate Partner Violence: Not on file   Housing Stability: Not on file      Medications and Allergies:     Current Outpatient Medications   Medication Sig Dispense Refill   • atorvastatin (LIPITOR) 40 mg tablet Take 40 mg by mouth daily     • baclofen (LIORESAL) 10 MG/20ML by Intrathecal route if needed     • baclofen 10 mg tablet TAKE 1 TABLET BY MOUTH THREE TIMES A  tablet 1   • dulaglutide (Trulicity) 2 73 WO/6 1KA injection Inject 0 5 mL (0 75 mg total) under the skin every 7 days 2 mL 0   • HYDROcodone-acetaminophen (NORCO) 5-325 mg per tablet Take 1 tablet by mouth daily as needed (severe pain) Max Daily Amount: 1 tablet 30 tablet 0   • ibuprofen (MOTRIN) 600 mg tablet Take 1 tablet by mouth every 8 (eight) hours as needed for moderate pain (pain) for up to 30 days 15 tablet 0   • meloxicam (MOBIC) 15 mg tablet TAKE 1 TABLET BY MOUTH EVERY DAY 30 tablet 1   • metFORMIN (GLUCOPHAGE) 1000 MG tablet TAKE 1 TABLET BY MOUTH TWICE A DAY WITH MEALS 180 tablet 1   • sertraline (ZOLOFT) 50 mg tablet TAKE 1 TABLET BY MOUTH EVERY DAY 90 tablet 1   • zolpidem (AMBIEN) 10 mg tablet Take 1 tablet (10 mg total) by mouth daily at bedtime as needed for sleep 30 tablet 0     No current facility-administered medications for this visit  Allergies   Allergen Reactions   • Gadolinium Derivatives Itching     Media Ready-Box (Contrast Dye)    • Ozempic (0 25 Or 0 5 Mg-Dose) [Semaglutide(0 25 Or 0 5mg-Dos)] Headache      Immunizations:     Immunization History   Administered Date(s) Administered   • Pneumococcal Polysaccharide PPV23 1970      Health Maintenance:         Topic Date Due   • HIV Screening  Never done   • Colorectal Cancer Screening  10/20/2024   • Hepatitis C Screening  Completed         Topic Date Due   • COVID-19 Vaccine (1) Never done   • Influenza Vaccine (Season Ended) 09/01/2023      Medicare Screening Tests and Risk Assessments:     Sandy Orozco is here for his Subsequent Wellness visit  Health Risk Assessment:   Patient rates overall health as good  Patient feels that their physical health rating is same  Patient is satisfied with their life  Eyesight was rated as same  Hearing was rated as same  Patient feels that their emotional and mental health rating is same  Patients states they are never, rarely angry  Patient states they are sometimes unusually tired/fatigued  Pain experienced in the last 7 days has been none  Patient states that he has experienced no weight loss or gain in last 6 months  Depression Screening:   PHQ-9 Score: 2      Fall Risk Screening:    In the past year, patient has experienced: history of falling in past year    Number of falls: 2 or more  Injured during fall?: No    Feels unsteady when standing or walking?: Yes    Worried about falling?: No      Home Safety:  Patient has trouble with stairs inside or outside of their home  Patient has working smoke alarms and has working carbon monoxide detector  Home safety hazards include: none  Nutrition:   Current diet is Regular  Medications:   Patient is currently taking over-the-counter supplements  OTC medications include: see medication list  Patient is able to manage medications  Activities of Daily Living (ADLs)/Instrumental Activities of Daily Living (IADLs):   Walk and transfer into and out of bed and chair?: Yes  Dress and groom yourself?: Yes    Bathe or shower yourself?: Yes    Feed yourself? Yes  Do your laundry/housekeeping?: Yes  Manage your money, pay your bills and track your expenses?: Yes  Make your own meals?: Yes    Do your own shopping?: Yes    Previous Hospitalizations:   Any hospitalizations or ED visits within the last 12 months?: No      Advance Care Planning:   Living will: Yes    Durable POA for healthcare: Yes    Advanced directive: Yes      Cognitive Screening:   Provider or family/friend/caregiver concerned regarding cognition?: No    PREVENTIVE SCREENINGS      Cardiovascular Screening:    General: Screening Current      Diabetes Screening:     General: Screening Not Indicated and History Diabetes      Colorectal Cancer Screening:     General: Screening Current      Prostate Cancer Screening:    General: Screening Current      Osteoporosis Screening:    General: Screening Not Indicated      Abdominal Aortic Aneurysm (AAA) Screening:        General: Screening Not Indicated      Lung Cancer Screening:     General: Screening Not Indicated      Hepatitis C Screening:    General: Screening Current    Screening, Brief Intervention, and Referral to Treatment (SBIRT)    Screening  Typical number of drinks in a day: 0  Typical number of drinks in a week: 0  Interpretation: Low risk drinking behavior      AUDIT-C Screenin) How often did you have a drink containing alcohol in the past "year? never  2) How many drinks did you have on a typical day when you were drinking in the past year? 0  3) How often did you have 6 or more drinks on one occasion in the past year? never    AUDIT-C Score: 0  Interpretation: Score 0-3 (male): Negative screen for alcohol misuse    Single Item Drug Screening:  How often have you used an illegal drug (including marijuana) or a prescription medication for non-medical reasons in the past year? never    Single Item Drug Screen Score: 0  Interpretation: Negative screen for possible drug use disorder    Brief Intervention  Alcohol & drug use screenings were reviewed  No concerns regarding substance use disorder identified  Review of Current Opioid Use    Opioid Risk Tool (ORT) Interpretation: Complete Opioid Risk Tool (ORT)    Other Counseling Topics:   Regular weightbearing exercise  No results found  Physical Exam:     /78   Pulse 102   Temp 97 7 °F (36 5 °C)   Ht 5' 10\" (1 778 m)   Wt 102 kg (225 lb)   SpO2 96%   BMI 32 28 kg/m²     Physical Exam  Vitals and nursing note reviewed  Constitutional:       General: He is not in acute distress  Appearance: Normal appearance  He is well-developed and normal weight  HENT:      Head: Normocephalic and atraumatic  Right Ear: Tympanic membrane, ear canal and external ear normal       Left Ear: Tympanic membrane, ear canal and external ear normal       Nose: Nose normal       Mouth/Throat:      Mouth: Mucous membranes are moist       Pharynx: No oropharyngeal exudate  Eyes:      Extraocular Movements: Extraocular movements intact  Conjunctiva/sclera: Conjunctivae normal       Pupils: Pupils are equal, round, and reactive to light  Neck:      Trachea: No tracheal deviation  Cardiovascular:      Rate and Rhythm: Normal rate and regular rhythm  Pulses: Normal pulses  Heart sounds: Normal heart sounds  No murmur heard    Pulmonary:      Effort: Pulmonary effort is normal  No " respiratory distress  Breath sounds: Normal breath sounds  No wheezing, rhonchi or rales  Chest:      Chest wall: No swelling, crepitus or edema  Abdominal:      General: Abdomen is protuberant  Bowel sounds are normal  There is no distension  Palpations: Abdomen is soft  There is no mass  Tenderness: There is no abdominal tenderness  There is no guarding  Comments: Baclofen pump as indicated    Musculoskeletal:      Right lower leg: No edema  Left lower leg: No edema  Lymphadenopathy:      Cervical: No cervical adenopathy  Skin:     General: Skin is warm and dry  Capillary Refill: Capillary refill takes less than 2 seconds  Findings: No erythema  Comments: approx 1cm diameter subdermal mass, suspected cyst    Neurological:      General: No focal deficit present  Mental Status: He is alert and oriented to person, place, and time  Cranial Nerves: No cranial nerve deficit  Gait: Gait abnormal (due to spastic paresis)     Psychiatric:         Mood and Affect: Mood normal           Lety Rojas MD

## 2023-06-09 LAB
CREAT UR-MCNC: 117 MG/DL
MICROALBUMIN UR-MCNC: 8.2 MG/L (ref 0–20)
MICROALBUMIN/CREAT 24H UR: 7 MG/G CREATININE (ref 0–30)

## 2023-06-09 PROCEDURE — 82043 UR ALBUMIN QUANTITATIVE: CPT | Performed by: FAMILY MEDICINE

## 2023-06-09 PROCEDURE — 82570 ASSAY OF URINE CREATININE: CPT | Performed by: FAMILY MEDICINE

## 2023-06-23 DIAGNOSIS — E11.65 CONTROLLED TYPE 2 DIABETES MELLITUS WITH HYPERGLYCEMIA, WITHOUT LONG-TERM CURRENT USE OF INSULIN (HCC): ICD-10-CM

## 2023-06-23 RX ORDER — MELOXICAM 15 MG/1
TABLET ORAL
Qty: 30 TABLET | Refills: 1 | Status: SHIPPED | OUTPATIENT
Start: 2023-06-23

## 2023-06-24 DIAGNOSIS — F51.01 PRIMARY INSOMNIA: ICD-10-CM

## 2023-06-26 RX ORDER — ZOLPIDEM TARTRATE 10 MG/1
TABLET ORAL
Qty: 30 TABLET | Refills: 0 | Status: SHIPPED | OUTPATIENT
Start: 2023-06-26

## 2023-06-26 RX ORDER — ZOLPIDEM TARTRATE 10 MG/1
10 TABLET ORAL
Qty: 30 TABLET | Refills: 0 | OUTPATIENT
Start: 2023-06-26

## 2023-06-26 NOTE — TELEPHONE ENCOUNTER
1 [id number] 05/26/2023 05/26/2023 Zolpidem Tartrate (Tablet) 30 0 30 10 MG NA SHAYY MG [facility]  Medicare 0 / 0 [place]    1 [id number] 04/26/2023 04/26/2023 Zolpidem Tartrate (Tablet) 30 0 30 10 MG NA [facility]  Medicare 0 / 0 PA    1 [id number] 04/05/2023 03/27/2023 ACETAMINOPHEN 325 MG / HYDROcodone BITARTRATE 5 MG ORAL TABLET (Tablet) 30 0 30 5 0 MG/325 0 MG NA [facility]  Private Pay 0 / 0 PA    1 [id number] 03/27/2023 03/27/2023 Zolpidem Tartrate (Tablet) 30 0 30 10 [facility]  Medicare 0 / 0 PA    1 [id number] 02/21/2023 02/21/2023 Zolpidem Tartrate (Tablet) 30 0 30 10 MG [facility]  Medicare 0 / 0 PA    1 [id number] 01/23/2023 01/23/2023 Zolpidem Tartrate (Tablet) 30 0 30 10 MG [facility]  Medicare 0 / 0 PA    1 [id number] 12/23/2022 12/23/2022 Zolpidem Tartrate (Tablet) 30 0 30 10 MG [facility]  Medicare 0 / 0 PA    1 [id number] 11/30/2022 11/30/2022 ACETAMINOPHEN 325 MG / HYDROcodone BITARTRATE 5 MG ORAL TABLET (Tablet) 30 0 30 5 0 MG/325 0 MG NA [facility]  Private Pay 0 / 0 PA    1 [id number] 11/25/2022 11/25/2022 Zolpidem Tartrate (Tablet) 30 0 30 10 MG [facility]  Medicare 0 / 0 PA    1 [id number] 10/24/2022 10/24/2022 Zolpidem Tartrate (Tablet) 30 0 30 10 MG [facility]

## 2023-06-28 ENCOUNTER — CONSULT (OUTPATIENT)
Dept: SURGERY | Facility: CLINIC | Age: 53
End: 2023-06-28

## 2023-06-28 VITALS
HEART RATE: 89 BPM | BODY MASS INDEX: 31.98 KG/M2 | RESPIRATION RATE: 12 BRPM | OXYGEN SATURATION: 98 % | DIASTOLIC BLOOD PRESSURE: 82 MMHG | SYSTOLIC BLOOD PRESSURE: 123 MMHG | HEIGHT: 70 IN | WEIGHT: 223.4 LBS | TEMPERATURE: 96.5 F

## 2023-06-28 DIAGNOSIS — D36.7 DERMOID CYST OF LEFT LOWER EXTREMITY: ICD-10-CM

## 2023-06-28 PROCEDURE — 88304 TISSUE EXAM BY PATHOLOGIST: CPT | Performed by: PATHOLOGY

## 2023-06-28 PROCEDURE — 88342 IMHCHEM/IMCYTCHM 1ST ANTB: CPT | Performed by: PATHOLOGY

## 2023-06-28 PROCEDURE — 88341 IMHCHEM/IMCYTCHM EA ADD ANTB: CPT | Performed by: PATHOLOGY

## 2023-06-28 NOTE — ASSESSMENT & PLAN NOTE
On excision, this looks more like a sebaceous cyst   We will give him a call with path report when available  He was given instructions on care and activity

## 2023-06-28 NOTE — PROGRESS NOTES
Office Visit - General Surgery  Reuben Friend MRN: [id number]  Encounter: [id number]    Assessment and Plan  Problem List Items Addressed This Visit        Other    Dermoid cyst of left lower extremity     On excision, this looks more like a sebaceous cyst   We will give him a call with path report when available  He was given instructions on care and activity  Relevant Orders    Tissue Exam       Chief Complaint:  Bernabe Richmond is a 52 y o  male who presents for Consult (Consult lump left leg )    Subjective  52-year-old male who has a lesion on the left thigh for a number of years like to have it excised      Past Medical History:   Diagnosis Date   • Diabetes mellitus ([facility] [address] )        Past Surgical History:   Procedure Laterality Date   • BACK SURGERY     • KNEE SURGERY Left    • VENTRAL HERNIA REPAIR         Family History   Problem Relation Age of Onset   • Diabetes Mother    • Lung cancer Father    • Skin cancer Father    • Prostate cancer Father    • Colon cancer Neg Hx    • Hypertension Neg Hx    • Hyperlipidemia Neg Hx    • Thyroid disease Neg Hx        Social History     Tobacco Use   • Smoking status: Never     Passive exposure: Never   • Smokeless tobacco: Never   Vaping Use   • Vaping Use: Never used   Substance Use Topics   • Alcohol use: No   • Drug use: No        Medications  Current Outpatient Medications on File Prior to Visit   Medication Sig Dispense Refill   • atorvastatin (LIPITOR) 40 mg tablet Take 40 mg by mouth daily     • baclofen (LIORESAL) 10 MG/20ML by Intrathecal route if needed     • baclofen 10 mg tablet TAKE 1 TABLET BY MOUTH THREE TIMES A  tablet 1   • dulaglutide (Trulicity) 8 49 TF/7 8XT injection Inject 0 5 mL (0 75 mg total) under the skin every 7 days 2 mL 0   • HYDROcodone-acetaminophen (NORCO) 5-325 mg per tablet Take 1 tablet by mouth daily as needed (severe pain) Max Daily Amount: 1 tablet 30 tablet 0   • meloxicam (MOBIC) 15 mg tablet TAKE 1 TABLET BY MOUTH EVERY DAY 30 tablet 1   • metFORMIN (GLUCOPHAGE) 1000 MG tablet TAKE 1 TABLET BY MOUTH TWICE A DAY WITH MEALS 180 tablet 1   • sertraline (ZOLOFT) 50 mg tablet TAKE 1 TABLET BY MOUTH EVERY DAY 90 tablet 1   • zolpidem (AMBIEN) 10 mg tablet TAKE 1 TABLET BY MOUTH DAILY AT BEDTIME AS NEEDED FOR SLEEP 30 tablet 0   • ibuprofen (MOTRIN) 600 mg tablet Take 1 tablet by mouth every 8 (eight) hours as needed for moderate pain (pain) for up to 30 days 15 tablet 0     No current facility-administered medications on file prior to visit  Allergies  Allergies   Allergen Reactions   • Gadolinium Derivatives Itching     Media Ready-Box (Contrast Dye)    • Ozempic (0 25 Or 0 5 Mg-Dose) [Semaglutide(0 25 Or 0 5mg-Dos)] Headache       Review of Systems    Objective  Vitals:    06/28/23 0926   BP: 123/82   Pulse: 89   Resp: 12   Temp: (!) 96 5 °F (35 8 °C)   SpO2: 98%       Physical Exam   Extremity: Left anterior thigh above the knee is a raised bluish area about 1 to 1 5 cm across  Feels firm  Procedures  After permission, the areas prepped and draped in usual fashion  Time-out taken  Local anesthesia 2% lidocaine with epinephrine was infiltrated into the skin and subcutaneous tissue  A total of 7 ml was used  Knife was used and elliptical incision made around the lesion in question caring it into the subcutaneous tissue  The size of the ellipse was 3x2cm  Skin flaps were created on both sides with a knife  The subcutaneous tissue was then approximated with 3-0 Vicryl  The skin closed with subcuticular 4 O Monocryl  Dressing applied  Patient tolerated procedure well    Size of the mass was 1 5 cm

## 2023-07-14 PROCEDURE — 88342 IMHCHEM/IMCYTCHM 1ST ANTB: CPT | Performed by: PATHOLOGY

## 2023-07-14 PROCEDURE — 88304 TISSUE EXAM BY PATHOLOGIST: CPT | Performed by: PATHOLOGY

## 2023-07-14 PROCEDURE — 88341 IMHCHEM/IMCYTCHM EA ADD ANTB: CPT | Performed by: PATHOLOGY

## 2023-07-24 DIAGNOSIS — F51.01 PRIMARY INSOMNIA: ICD-10-CM

## 2023-07-24 DIAGNOSIS — G83.9 SPASTIC PARESIS (HCC): ICD-10-CM

## 2023-07-25 RX ORDER — HYDROCODONE BITARTRATE AND ACETAMINOPHEN 5; 325 MG/1; MG/1
1 TABLET ORAL DAILY PRN
Qty: 30 TABLET | Refills: 0 | Status: SHIPPED | OUTPATIENT
Start: 2023-07-25

## 2023-07-25 RX ORDER — ZOLPIDEM TARTRATE 10 MG/1
10 TABLET ORAL
Qty: 30 TABLET | Refills: 0 | Status: SHIPPED | OUTPATIENT
Start: 2023-07-25

## 2023-07-25 NOTE — TELEPHONE ENCOUNTER
1 [id number] 06/26/2023 06/26/2023 Zolpidem Tartrate (Tablet) 30.0 30 10 MG NA ELSPETH BLACK-MIRELES [facility] Medicare 0 / 0 [place]    1 [id number] 05/26/2023 05/26/2023 Zolpidem Tartrate (Tablet) 30.0 30 10 MG NA SHAYY HOLLIDAY [facility]  Medicare 0 / 0 PA    1 [id number] 04/26/2023 04/26/2023 Zolpidem Tartrate (Tablet) 30.0 30 10 MG NA RISHABH JOAQUIN

## 2023-08-20 DIAGNOSIS — E11.65 CONTROLLED TYPE 2 DIABETES MELLITUS WITH HYPERGLYCEMIA, WITHOUT LONG-TERM CURRENT USE OF INSULIN (HCC): ICD-10-CM

## 2023-08-21 RX ORDER — MELOXICAM 15 MG/1
TABLET ORAL
Qty: 30 TABLET | Refills: 1 | Status: SHIPPED | OUTPATIENT
Start: 2023-08-21

## 2023-08-23 DIAGNOSIS — F51.01 PRIMARY INSOMNIA: ICD-10-CM

## 2023-08-25 DIAGNOSIS — F51.01 PRIMARY INSOMNIA: ICD-10-CM

## 2023-08-25 RX ORDER — ZOLPIDEM TARTRATE 10 MG/1
10 TABLET ORAL
Qty: 30 TABLET | Refills: 0 | Status: SHIPPED | OUTPATIENT
Start: 2023-08-25

## 2023-08-25 RX ORDER — ZOLPIDEM TARTRATE 10 MG/1
10 TABLET ORAL
Qty: 30 TABLET | Refills: 0 | OUTPATIENT
Start: 2023-08-25

## 2023-08-25 NOTE — TELEPHONE ENCOUNTER
[id number] 07/25/2023 07/25/2023 ACETAMINOPHEN 325 MG / HYDROcodone BITARTRATE 5 MG ORAL TABLET (Tablet) 30.0 30 5.0 MG/325.0 MG 5.0 [facility]     1 [id number] 07/25/2023 07/25/2023 Zolpidem Tartrate (Tablet) 30.0 30 10 MG NA [facility] / 0 [facility]    1 [id number] 06/26/2023 06/26/2023 Zolpidem Tartrate (Tablet) 30.0 30 10 MG NA [facility]  Medicare 0 / 0 [place]    1 [id number] 05/26/2023 05/26/2023 Zolpidem Tartrate (Tablet) 30.0 30 10 MG NA SHAYY [place]AY [place]

## 2023-09-06 LAB
LEFT EYE DIABETIC RETINOPATHY: NORMAL
RIGHT EYE DIABETIC RETINOPATHY: NORMAL

## 2023-09-25 DIAGNOSIS — F51.01 PRIMARY INSOMNIA: ICD-10-CM

## 2023-09-25 RX ORDER — ZOLPIDEM TARTRATE 10 MG/1
10 TABLET ORAL
Qty: 30 TABLET | Refills: 0 | Status: SHIPPED | OUTPATIENT
Start: 2023-09-25

## 2023-09-25 NOTE — TELEPHONE ENCOUNTER
Reason for call:   [x] Refill   [] Prior Auth  [] Other:     Office:   [x] PCP/Provider - Fam [facility] Tati/ Tobi  [] Speciality/Provider -     Medication: zolpidem    Dose/Frequency: 10mg qd hs prn    Quantity: 30    Pharmacy:   [facility]/pharmacy #[id number]. Does the patient have enough for 3 days?    [] Yes   [x] No - Send as HP to POD

## 2023-10-18 ENCOUNTER — VBI (OUTPATIENT)
Dept: ADMINISTRATIVE | Facility: OTHER | Age: 53
End: 2023-10-18

## 2023-10-19 DIAGNOSIS — G83.9 SPASTIC PARESIS (HCC): ICD-10-CM

## 2023-10-19 RX ORDER — HYDROCODONE BITARTRATE AND ACETAMINOPHEN 5; 325 MG/1; MG/1
1 TABLET ORAL DAILY PRN
Qty: 30 TABLET | Refills: 0 | Status: SHIPPED | OUTPATIENT
Start: 2023-10-19

## 2023-10-19 NOTE — TELEPHONE ENCOUNTER
1 [id number] 09/25/2023 09/25/2023 Zolpidem Tartrate (Tablet) 30.0 30 10 MG NA [facility] Medicare 0 / 0 [place]    1 [id number] 08/25/2023 08/25/2023 Zolpidem Tartrate (Tablet) 30.0 30 10 MG NA [facility]. Medicare 0 / 0 PA    1 [id number] 07/25/2023 07/25/2023 ACETAMINOPHEN 325 MG / HYDROcodone BITARTRATE 5 MG ORAL TABLET (Tablet) 30.0 30 5.0 MG/325.0 MG 5.0 [facility], [facility] Private Pay 0 / 0 PA    1 [id number] 07/25/2023 07/25/2023 Zolpidem Tartrate (Tablet) 30.0 30 10 MG NA [facility], [facility] Medicare 0 / 0 PA    1 [id number] 06/26/2023 06/26/2023 Zolpidem Tartrate (Tablet) 30.0 30 10 MG NA [facility]. Medicare 0 / 0 PA    1 [id number] 05/26/2023 05/26/2023 Zolpidem Tartrate (Tablet) 30.0 30 10 MG NA [facility] Medicare 0 / 0 PA    1 [id number] 04/26/2023 04/26/2023 Zolpidem Tartrate (Tablet) 30.0 30 10 MG NA [facility] Medicare 0 / 0 PA    1 [id number] 04/05/2023 03/27/2023 ACETAMINOPHEN 325 MG / HYDROcodone BITARTRATE 5 MG ORAL TABLET (Tablet) 30.0 30 5.0 MG/325.0 MG 5.0 [facility] Private Pay 0 / 0 PA    1 [id number] 03/27/2023 03/27/2023 Zolpidem Tartrate (Tablet) 30.0 30 10 MG NA [facility] Medicare 0 / 0 PA    1 [id number] 02/21/2023 02/21/2023 Zolpidem Tartrate (Tablet) 30.0 30 10 MG NA ELSPETH BLACK-[facility]  Medicare 0 / 0 [place]

## 2023-10-19 NOTE — TELEPHONE ENCOUNTER
Reason for call:   [x] Refill   [] Prior Auth  [] Other:     Office:   [x] PCP/Provider - Black  [] Specialty/Provider -     Medication: Hydrocodone-Apap 5-325mg / 1 tab daily prn      Quantity: 30    Pharmacy: [facility]    Does the patient have enough for 3 days?    [x] Yes   [] No - Send as HP to POD

## 2023-10-23 DIAGNOSIS — E11.65 CONTROLLED TYPE 2 DIABETES MELLITUS WITH HYPERGLYCEMIA, WITHOUT LONG-TERM CURRENT USE OF INSULIN (HCC): ICD-10-CM

## 2023-10-23 RX ORDER — MELOXICAM 15 MG/1
TABLET ORAL
Qty: 30 TABLET | Refills: 1 | Status: SHIPPED | OUTPATIENT
Start: 2023-10-23

## 2023-10-25 DIAGNOSIS — F51.01 PRIMARY INSOMNIA: ICD-10-CM

## 2023-10-25 NOTE — TELEPHONE ENCOUNTER
[id number] 09/25/2023 09/25/2023 Zolpidem Tartrate (Tablet) 30.0 30 10 MG NA SHAYY HOLLIDAY [facility] Medicare 0 / 0 PA     1 [id number] 08/25/2023 08/25/2023 Zolpidem Tartrate (Tablet) 30.0 30 10 MG NA RISHABH JOAQUIN [facility] Medicare 0 / 0 PA    1 [id number] 07/25/2023 07/25/2023 ACETAMINOPHEN 325 MG / HYDROcodone BITARTRATE 5 MG ORAL TABLET (Tablet) 30.0 30 5.0 MG/325.0 MG 5.0 SHENA [facility] 0 / 0 PA    1 [id number] 07/25/2023 07/25/2023 Zolpidem Tartrate (Tablet) 30.0 30 10 [facility]  Medicare 0 / 0 PA

## 2023-10-25 NOTE — TELEPHONE ENCOUNTER
Reason for call:   [x] Refill   [] Prior Auth  [] Other:     Office:   [x] PCP/Provider -   [] Specialty/Provider -     Medication: Ambien    Dose/Frequency: 10 mg    Quantity: #30    Pharmacy: [facility]     Does the patient have enough for 3 days?    [] Yes   [x] No - Send as HP to POD

## 2023-10-26 RX ORDER — ZOLPIDEM TARTRATE 10 MG/1
10 TABLET ORAL
Qty: 30 TABLET | Refills: 0 | Status: SHIPPED | OUTPATIENT
Start: 2023-10-26

## 2023-11-20 DIAGNOSIS — E11.65 CONTROLLED TYPE 2 DIABETES MELLITUS WITH HYPERGLYCEMIA, WITHOUT LONG-TERM CURRENT USE OF INSULIN (HCC): ICD-10-CM

## 2023-11-24 DIAGNOSIS — F51.01 PRIMARY INSOMNIA: ICD-10-CM

## 2023-11-24 NOTE — TELEPHONE ENCOUNTER
Reason for call:   [x] Refill   [] Prior Auth  [] Other:     Office:   [x] PCP/Provider -   [] Specialty/Provider -     Medication: Ambien    Dose/Frequency: 10 mg tablet    Quantity: #30    Pharmacy: [facility]     Does the patient have enough for 3 days?    [] Yes   [x] No - Send as HP to POD

## 2023-11-27 RX ORDER — ZOLPIDEM TARTRATE 10 MG/1
10 TABLET ORAL
Qty: 30 TABLET | Refills: 0 | Status: SHIPPED | OUTPATIENT
Start: 2023-11-27

## 2023-11-27 NOTE — TELEPHONE ENCOUNTER
1 [id number] 10/26/2023 10/26/2023 Zolpidem Tartrate (Tablet) 30.0 30 10 MG NA [facility] Medicare 0 / 0 PA    1 [id number] 10/19/2023 10/19/2023 ACETAMINOPHEN 325 MG / HYDROcodone BITARTRATE 5 MG ORAL TABLET (Tablet) 30.0 30 5.0 MG/325.0 MG 5.0 [facility] Private Pay 0 / 0 PA    1 [id number] 09/25/2023 09/25/2023 Zolpidem Tartrate (Tablet) 30.0 30 10 MG NA [facility]. Medicare 0 / 0 PA    1 [id number] 08/25/2023 08/25/2023 Zolpidem Tartrate (Tablet) 30.0 30 10 MG NA [facility] ..C. Medicare 0 / 0 PA    1 [id number] 07/25/2023 07/25/2023 ACETAMINOPHEN 325 MG / HYDROcodone BITARTRATE 5 MG ORAL TABLET (Tablet) 30.0 30 5.0 MG/325.0 MG 5.0 [facility] Private Pay 0 / 0 PA    1 [id number] 07/25/2023 07/25/2023 Zolpidem Tartrate (Tablet) 30.0 30 10 MG NA [facility] ..C. Medicare 0 / 0 PA    1 [id number] 06/26/2023 06/26/2023 Zolpidem Tartrate (Tablet) 30.0 30 10 MG NA [facility] Medicare 0 / 0 PA    1 [id number] 05/26/2023 05/26/2023 Zolpidem Tartrate (Tablet) 30.0 30 10 MG NA [facility]. Medicare 0 / 0 PA    1 [id number] 04/26/2023 04/26/2023 Zolpidem Tartrate (Tablet) 30.0 30 10 MG NA [facility]. Medicare 0 / 0 PA    1 [id number] 04/05/2023 03/27/2023 ACETAMINOPHEN 325 MG / HYDROcodone BITARTRATE 5 MG ORAL TABLET (Tablet) 30.0 30 5.0 MG/325.0 MG 5.0 [facility]

## 2023-12-04 ENCOUNTER — RA CDI HCC (OUTPATIENT)
Dept: OTHER | Facility: HOSPITAL | Age: 53
End: 2023-12-04

## 2023-12-04 NOTE — PROGRESS NOTES
[address] [facility] coding opportunities       Chart reviewed, no opportunity found: [address] REA        Patients Insurance     Medicare Insurance: [employer] Advantage

## 2023-12-23 DIAGNOSIS — E11.65 CONTROLLED TYPE 2 DIABETES MELLITUS WITH HYPERGLYCEMIA, WITHOUT LONG-TERM CURRENT USE OF INSULIN (HCC): ICD-10-CM

## 2023-12-23 DIAGNOSIS — F51.01 PRIMARY INSOMNIA: ICD-10-CM

## 2023-12-26 RX ORDER — MELOXICAM 15 MG/1
TABLET ORAL
Qty: 30 TABLET | Refills: 5 | Status: SHIPPED | OUTPATIENT
Start: 2023-12-26

## 2023-12-26 RX ORDER — ZOLPIDEM TARTRATE 10 MG/1
10 TABLET ORAL
Qty: 30 TABLET | Refills: 0 | Status: SHIPPED | OUTPATIENT
Start: 2023-12-26

## 2023-12-26 NOTE — TELEPHONE ENCOUNTER
REASON FOR CONSULTATION:  Type 2 diabetes    REFERRING PROVIDER: Hossein Dennison MD     HPI: Francine Chairez is a 70 year old female with past medical history of type 2 diabetes complicated by CKD, CAD, hyperglycemia, neuropathy hypertension, hyperlipidemia and obesity, on continuous glucose monitor who is presenting for initial  visit regarding type 2 diabetes, last seen by Dr Kyle in 12/2022    Diabetes:     Diagnosis: More than 20 years ago  Family history: Parents  BMI: 30.99  Lost 8 lbs since starting Ozempic    Wt Readings from Last 4 Encounters:   06/27/23 87.1 kg (192 lb)   03/01/23 87.5 kg (192 lb 14.4 oz)   02/10/23 87.4 kg (192 lb 9.2 oz)   01/28/23 86.7 kg (191 lb 2.2 oz)     HbA1C: 6.1% in 12/2022 -> 6.9 % on 6/27/23  C-peptide, SHAGUFTA, and Islet cell antibodies: None  Home glucose monitoring:   Freestyle keke data    Patterns: Highs post lunch and dinner, likely related to diet ( rice)  Hypoglycemia: No  Current medications:    Lantus 14 units daily     Metformin 500 mg once a day   Ozempic 0.5 mg once a week, rarely constipation  Jardiance 10 mg daily   Compliance: Good  Complications:   CAD/CVA/Foot ulcers/ gangrene: History of CAD, status post pacemaker  CKD/ microalbuminuria: Has CKD, last GFR 31  Neuropathy: yes  Retinopathy/cataracts: No  Last eye exam: 10/2022 per patient  Last foot exam: 6/27/23  Diet:  Breakfast: Coffee, oatmeal, egg  Lunch: Rice, beans, meat  Dinner: Salad, chicken, rice   Snack: Peanuts, banana( for potassium)   Exercise: walks  HTN: On ARB  LDL: On statin    ROS  Review of Systems   Constitutional: Negative for activity change, appetite change, diaphoresis, fatigue and unexpected weight change.   HENT: Negative for sore throat and voice change.    Eyes: Positive for visual disturbance. Negative for photophobia.   Respiratory: Negative for cough and shortness of breath.    Cardiovascular: Negative for chest pain, palpitations and leg swelling.   Gastrointestinal: Negative for  Reason for call:   [x] Refill   [] Prior Auth  [] Other:     Office:   [x] PCP/Provider -   [] Specialty/Provider -     Medication: zolpidem 10 mg, one tab at nighttime prn, 30 tablets      [facility]acy: [facility]    Does the patient have enough for 3 days?   [] Yes   [x] No - Send as HP to POD        Pharmacy req rx for mobic on 12/23   abdominal pain, constipation, diarrhea, nausea and vomiting.   Endocrine: Negative.    Genitourinary: Negative for dysuria.   Musculoskeletal: Positive for arthralgias. Negative for back pain.   Skin: Negative for rash.   Neurological: Positive for numbness. Negative for syncope, weakness and headaches.   Psychiatric/Behavioral: Negative for dysphoric mood and sleep disturbance.   All other systems reviewed and are negative.       Past Medical History:   Diagnosis Date   • Diabetes mellitus (CMD)    • Essential (primary) hypertension        Past Surgical History:   Procedure Laterality Date   • Appendectomy     •  section, low transverse      5   • Pacemaker implant         Current Outpatient Medications   Medication Sig Dispense Refill   • Semaglutide,0.25 or 0.5MG/DOS, (Ozempic, 0.25 or 0.5 MG/DOSE,) 2 MG/3ML Solution Pen-injector Inject 0.5 mg into the skin every 7 days. 3 mL 3   • Continuous Blood Gluc  (FreeStyle Irina 14 Day [place]) Device      • atorvastatin (LIPITOR) 20 MG tablet TAKE 1 TABLET BY MOUTH DAILY 90 tablet 1   • insulin glargine (Lantus SoloStar) 100 UNIT/ML pen-injector Inject 14 Units into the skin nightly. Prime 2 units before each dose. 15 mL 0   • amLODIPine (NORVASC) 10 MG tablet TAKE 1 TABLET BY MOUTH DAILY 90 tablet 1   • losartan (COZAAR) 100 MG tablet Take 1 tablet by mouth daily. 180 tablet 1   • metFORMIN (GLUCOPHAGE) 500 MG tablet Take 1 tablet by mouth daily (with breakfast). 180 tablet 2   • empagliflozin (JARDIANCE) 10 MG tablet Take 1 tablet by mouth daily (before breakfast). 90 tablet 3   • chlorthalidone (THALITONE) 25 MG tablet Take 1 tablet by mouth daily. 90 tablet 1   • metoPROLOL succinate (TOPROL-XL) 200 MG 24 hr tablet Take 1 tablet by mouth daily. 90 tablet 1   • Insulin Pen Needle (TRUEplus Pen Needles) 32G X 4 MM Misc Use to inject insulin 1 times daily. Remove needle cover(s) to expose needle before injecting. 100 each 11   • blood glucose (True  Metrix Blood Glucose Test) test strip Test blood sugar 2 times daily. Diagnosis:T2DM on insulin 180 strip 11   • Lancets 28G Misc Check BG BID - Dx T2DM on insulin with hyperglycemia 180 each 3   • aspirin 81 MG tablet Take 1 tablet by mouth daily. 90 tablet 3     No current facility-administered medications for this visit.       ALLERGIES:  No Known Allergies    Social History     Tobacco Use   • Smoking status: Never   • Smokeless tobacco: Never   Vaping Use   • Vaping Use: never used   Substance Use Topics   • Alcohol use: No   • Drug use: Never       Family History   Problem Relation Age of Onset   • Diabetes Mother    • Diabetes Father          PHYSICAL EXAM:    Visit Vitals  /72 (BP Location: RUE - Right upper extremity, Patient Position: Sitting, Cuff Size: Regular)   Pulse 67   Temp 97.2 °F (36.2 °C) (Temporal)   Wt 87.1 kg (192 lb)   SpO2 99%   BMI 30.99 kg/m²       Wt Readings from Last 3 Encounters:   06/27/23 87.1 kg (192 lb)   03/01/23 87.5 kg (192 lb 14.4 oz)   02/10/23 87.4 kg (192 lb 9.2 oz)       Physical Exam  Constitutional:       Appearance: Normal appearance.   HENT:      Head: Normocephalic and atraumatic.   Eyes:      Conjunctiva/sclera: Conjunctivae normal.      Pupils: Pupils are equal, round, and reactive to light.   Cardiovascular:      Rate and Rhythm: Normal rate and regular rhythm.      Pulses: Normal pulses.      Heart sounds: Normal heart sounds.   Pulmonary:      Effort: Pulmonary effort is normal.      Breath sounds: Normal breath sounds.   Abdominal:      General: Abdomen is flat. Bowel sounds are normal.      Palpations: Abdomen is soft.   Musculoskeletal:         General: Normal range of motion.      Cervical back: Neck supple.   Lymphadenopathy:      Cervical: No cervical adenopathy.   Skin:     General: Skin is warm and dry.   Neurological:      General: No focal deficit present.      Mental Status: She is alert and oriented to person, place, and time.   Psychiatric:          Mood and Affect: Mood normal.          Bilateral Normal Foot Exam: Skin integrity is normal, no edema, erythema, blisters, with callosities on ball of foot, and heel. No ulcers. Dorsalis pedis and posterior tibial pulses are 1+. Pressure sensation using the [place] monofilament is abent. Dysmorphic nails +       DATA REVIEW:  Lab Results   Component Value Date/Time    GLUB 184 (H) 02/02/2023 11:24 AM    GLUB 138 (H) 02/02/2023 06:17 AM    GLUB 261 (H) 02/01/2023 08:10 PM    GLUB 96 02/01/2023 04:03 PM    GLUB 111 (H) 02/01/2023 12:20 PM    GLUB 123 (H) 02/01/2023 06:09 AM    GLUB 116 (H) 01/31/2023 11:47 PM    GLUB 138 (H) 01/31/2023 08:41 PM       Hemoglobin A1C, POC (%)   Date Value   12/15/2022 6.1 (A)   09/02/2022 7.7 (A)     Hemoglobin A1C (%)   Date Value   05/21/2022 8.6 (H)   02/19/2022 9.5 (H)   10/29/2021 9.0 (H)       Microalbumin/ Creatinine Ratio (mg/g)   Date Value   11/05/2022 4.8   02/19/2022 106.9 (H)   04/17/2021 109.1 (H)       Triglycerides (mg/dL)   Date Value   11/05/2022 54     HDL (mg/dL)   Date Value   11/05/2022 53     LDL (mg/dL)   Date Value   11/05/2022 59     Sodium (mmol/L)   Date Value   05/09/2023 141     Potassium (mmol/L)   Date Value   05/09/2023 3.4     Chloride (mmol/L)   Date Value   05/09/2023 102     Glucose (mg/dL)   Date Value   05/09/2023 87     Calcium (mg/dL)   Date Value   05/09/2023 9.0     Carbon Dioxide (mmol/L)   Date Value   05/09/2023 30     BUN (mg/dL)   Date Value   05/09/2023 30 (H)     Creatinine (mg/dL)   Date Value   05/09/2023 1.72 (H)     TSH (mcUnits/mL)   Date Value   01/27/2023 1.354     WBC (K/mcL)   Date Value   05/09/2023 5.8     RBC (mil/mcL)   Date Value   05/09/2023 5.13     HCT (%)   Date Value   05/09/2023 41.9     HGB (g/dL)   Date Value   05/09/2023 14.0     PLT (K/mcL)   Date Value   05/09/2023 226     GOT/AST (Units/L)   Date Value   01/27/2023 27     GPT/ALT (Units/L)   Date Value   01/27/2023 25     Alkaline Phosphatase  (Units/L)   Date Value   01/27/2023 185 (H)     Bilirubin, Total (mg/dL)   Date Value   01/27/2023 0.6         ASSESSMENT/PLAN:    Diagnoses and all orders for this visit:    Type 2 diabetes mellitus with stage 3b chronic kidney disease, with long-term current use of insulin (CMD)  Goal HbA1C is < 7.5 -8 percent given age and comorbidities, goal is to avoid hypoglycemia  Complications include CKD, CAD  Medications as follows:   Given GFR 31, stop metformin  Not a candidate for SGLT-2 given GFR < 31  Discussed increasing GLP-1 and stopping metformin + Jardiance; the patient is agreeable  Stop metformin and Jardiance  Increase Ozempic to 1 gm weekly  Lantus increase to 16 units  Free style keke continuous glucose monitor was downloaded, report generated and scanned under media. ( See HPI for details) I independently reviewed and analyzed these findings.   Ophthalmology: Will obtain records, 10/22 per patient  Diabetes foot exam: 6/27/2023, referred to podiatry for toe nail clipping and diabetic shoes, given patient has neuropathy  Referral to pharmacist provided for medication adjustment  Reviewed clinical significance of A1c, adverse effects of suboptimal glucose control, complications of uncontrolled DM and goals of therapy discussed with patient  Reminded patient on HA1C and blood sugar targets   Reinforced timing and adherence with medication, self-monitoring of blood glucose and routine follow up   Reviewed hypoglycemia signs/symptoms, treatment using the Rule of 15'   Discussed with pt importance of carrying glucose tablets in case of hypoglycemia and to test blood glucose before driving  Patient has been asked to contact either myself or PCP if glucose levels less than 70 or persistently greater than 250 prior to next follow up visit.   Patient verbalized understanding and is agreeable to above plan of care  Complexity of decision making is high for review of records, tests, labs and independent visualization  and interpretation of glucose values and meter download, control not optimal with need for further medication management. Risk is high due to insulin use and the potential for hyper and hypoglycemia.      Stage 3b chronic kidney disease (CMD)  Discussed importance of glycemic and BP control to prevent progression of CKD  On ARB  Primary hypertension  Target BP of < 130/80 discussed:   Advised following up with PCP if out of range.   Complications of poorly controlled HTN reviewed in detail and importance of medication adherence encouraged.    Mixed hyperlipidemia  Continue current therapy  Discussed complications of HLD in details and medication adherence encouraged           Return in about 3 months (around 9/27/2023).         Jyoti Barlow MD     I spent total of 47  min including time spent in chart review, face to face time spent with patient and post visit documentation/care co-ordination/ counselling and other services detailed on the day of this encounter    Portions of this note may have been created using the Dragon voice recognition system.Errors in content may be related to improper recognition of the system.  Effort to review and correct the note has been made but irregularities may still be present. Medication reconciliation was done but medications not prescribed by me may be inaccurate.

## 2023-12-26 NOTE — TELEPHONE ENCOUNTER
1 [id number] 11/27/2023 11/27/2023 Zolpidem Tartrate (Tablet) 30.0 30 10 MG NA [facility] Medicare 0 / 0 PA    1 [id number] 10/26/2023 10/26/2023 Zolpidem Tartrate (Tablet) 30.0 30 10 MG NA [facility], [facility]. Medicare 0 / 0 PA    1 [id number] 10/19/2023 10/19/2023 ACETAMINOPHEN 325 MG / HYDROcodone BITARTRATE 5 MG ORAL TABLET (Tablet) 30.0 30 5.0 MG/325.0 MG 5.0 [facility] Private Pay 0 / 0 PA    1 [id number] 09/25/2023 09/25/2023 Zolpidem Tartrate (Tablet) 30.0 30 10 MG NA [facility], [facility].C. Medicare 0 / 0 PA    1 [id number] 08/25/2023 08/25/2023 Zolpidem Tartrate (Tablet) 30.0 30 10 MG NA [facility] Medicare 0 / 0 PA    1 [id number] 07/25/2023 07/25/2023 ACETAMINOPHEN 325 MG / HYDROcodone BITARTRATE 5 MG ORAL TABLET (Tablet) 30.0 30 5.0 MG/325.0 MG 5.0 [facility] Private Pay 0 / 0 PA    1 [id number] 07/25/2023 07/25/2023 Zolpidem Tartrate (Tablet) 30.0 30 10 MG NA [facility] Medicare 0 / 0 PA    1 [id number] 06/26/2023 06/26/2023 Zolpidem Tartrate (Tablet) 30.0 30 10 MG NA [facility], ..C. Medicare 0 / 0 PA    1 [id number] 05/26/2023 05/26/2023 Zolpidem Tartrate (Tablet) 30.0 30 10 MG NA [facility], ..C. Medicare 0 / 0 PA    1 [id number] 04/26/2023 04/26/2023 Zolpidem Tartrate (Tablet) 30.0 30 10 MG NA RISHABH JOAQUIN [facility] Medicar

## 2024-01-26 DIAGNOSIS — F51.01 PRIMARY INSOMNIA: ICD-10-CM

## 2024-01-26 NOTE — TELEPHONE ENCOUNTER
Reason for call:   [x] Refill   [] Prior Auth  [] Other:     Office:   [x] PCP/Provider - Melissa  [] Specialty/Provider -     Medication: zolpidem (AMBIEN) 10 mg tablet     Dose/Frequency: Take 1 tablet (10 mg total) by mouth daily at bedtime as needed for sleep     Quantity: 30    Pharmacy: [facility] [address]    Does the patient have enough for 3 days?   [] Yes   [x] No - Send as HP to POD

## 2024-01-29 NOTE — TELEPHONE ENCOUNTER
Patient called the RX Refill Line. Message is being forwarded to the office.     Patient is requesting -     Pt called to check refill status Pt states he is out of medication and he requested this refill since this past Friday 1/26/2024 please review. Thank you.    Please contact patient at - [phone]

## 2024-01-29 NOTE — TELEPHONE ENCOUNTER
[id number] 12/26/2023 12/26/2023 Zolpidem Tartrate (Tablet) 30.0 30 10 MG NA [facility] Medicare 0 / 0 PA     1 [id number] 11/27/2023 11/27/2023 Zolpidem Tartrate (Tablet) 30.0 30 10 MG NA [facility] Medicare 0 / 0 PA    1 [id number] 10/26/2023 10/26/2023 Zolpidem Tartrate (Tablet) 30.0 30 10 MG NA [facility] Medicare 0 / 0 PA    1 [id number] 10/19/2023 10/19/2023 ACETAMINOPHEN 325 MG / HYDROcodone BITARTRATE 5 MG ORAL TABLET (Tablet) 30.0 30 5.0 MG/325.0 MG 5.0 [facility]CL[facility] Private Pay 0 / 0 PA

## 2024-01-29 NOTE — TELEPHONE ENCOUNTER
Pt is calling in stating that he has called in several times in regards to this medication and it has yet to be called into the pharmacy. Pt is inquiring about getting a refill on the Zolpidem (Ambien) 10 mg to the [facility] on [address] [id number]. Pt would like a call back whenever the prescription is sent to the pharmacy. Please advise with the pt if needed thank you.

## 2024-01-30 RX ORDER — ZOLPIDEM TARTRATE 10 MG/1
10 TABLET ORAL
Qty: 30 TABLET | Refills: 0 | OUTPATIENT
Start: 2024-01-30

## 2024-01-30 RX ORDER — ZOLPIDEM TARTRATE 10 MG/1
10 TABLET ORAL
Qty: 30 TABLET | Refills: 0 | Status: SHIPPED | OUTPATIENT
Start: 2024-01-30

## 2024-02-27 DIAGNOSIS — F51.01 PRIMARY INSOMNIA: ICD-10-CM

## 2024-02-28 RX ORDER — ZOLPIDEM TARTRATE 10 MG/1
10 TABLET ORAL
Qty: 30 TABLET | Refills: 0 | Status: SHIPPED | OUTPATIENT
Start: 2024-02-28

## 2024-02-28 NOTE — TELEPHONE ENCOUNTER
Patient called to request a refill for their zolpidem advised a refill was requested on 2/27/24 and is pending approval. Patient verbalized understanding and is in agreement.      Patient is out of medication after tonight.

## 2024-02-28 NOTE — TELEPHONE ENCOUNTER
1 [id number] 01/30/2024 01/30/2024 Zolpidem Tartrate (Tablet) 30.0 30 10 MG NA [facility], [facility]. Medicare 0 / 0 PA    1 [id number] 12/26/2023 12/26/2023 Zolpidem Tartrate (Tablet) 30.0 30 10 MG NA [facility] Medicare 0 / 0 PA    1 [id number] 11/27/2023 11/27/2023 Zolpidem Tartrate (Tablet) 30.0 30 10 MG NA [facility], [facility].C. Medicare 0 / 0 PA    1 [id number] 10/26/2023 10/26/2023 Zolpidem Tartrate (Tablet) 30.0 30 10 MG NA [facility], ..C. Medicare 0 / 0 PA    1 [id number] 10/19/2023 10/19/2023 ACETAMINOPHEN 325 MG / HYDROcodone BITARTRATE 5 MG ORAL TABLET (Tablet) 30.0 30 5.0 MG/325.0 MG 5.0 [facility] Private Pay 0 / 0 PA    1 [id number] 09/25/2023 09/25/2023 Zolpidem Tartrate (Tablet) 30.0 30 10 MG [facility] Medicare 0 / 0 PA    1 [id number] 08/25/2023 08/25/2023 Zolpidem Tartrate (Tablet) 30.0 30 10 MG NA [facility], [facility].C. Medicare 0 / 0 PA    1 [id number] 07/25/2023 07/25/2023 ACETAMINOPHEN 325 MG / HYDROcodone BITARTRATE 5 MG ORAL TABLET (Tablet) 30.0 30 5.0 MG/325.0 MG 5.0 [facility], [facility].C. Private Pay 0 / 0 PA    1 [id number] 07/25/2023 07/25/2023 Zolpidem Tartrate (Tablet) 30.0 30 10 MG NA [facility], [facility]. Medicare 0 / 0 PA    1 [id number] 06/26/2023 06/26/2023 Zolpidem Tartrate (Tablet) 30.0 30 10 MG NA [facility]CLori Medicare 0 / 0 PA

## 2024-03-28 DIAGNOSIS — F51.01 PRIMARY INSOMNIA: ICD-10-CM

## 2024-03-28 NOTE — TELEPHONE ENCOUNTER
Reason for call:   [x] Refill   [] Prior Auth  [] Other:     Office:   [x] PCP/Provider - Shanice Thornton  [] Specialty/Provider -     Medication: Zolpidem     Dose/Frequency: 10 mg HS PRN    Quantity: 30    [facility]    Does the patient have enough for 3 days?   [] Yes   [x] No - Send as HP to POD

## 2024-03-28 NOTE — TELEPHONE ENCOUNTER
1 [id number] 02/29/2024 02/29/2024 Zolpidem Tartrate (Tablet) 30.0 30 10 MG NA [facility], ..C. Medicare 0 / 0 PA    1 [id number] 01/30/2024 01/30/2024 Zolpidem Tartrate (Tablet) 30.0 30 10 MG NA [facility], [facility].C. Medicare 0 / 0 PA    1 [id number] 12/26/2023 12/26/2023 Zolpidem Tartrate (Tablet) 30.0 30 10 MG NA [facility], ..C. Medicare 0 / 0 PA    1 [id number] 11/27/2023 11/27/2023 Zolpidem Tartrate (Tablet) 30.0 30 10 MG NA [facility], ..C. Medicare 0 / 0 PA    1 [id number] 10/26/2023 10/26/2023 Zolpidem Tartrate (Tablet) 30.0 30 10 MG NA [facility] Medicare 0 / 0 PA    1 [id number] 10/19/2023 10/19/2023 ACETAMINOPHEN 325 MG / HYDROcodone BITARTRATE 5 MG ORAL TABLET (Tablet) 30.0 30 5.0 MG/325.0 MG 5.0 [facility] Private Pay 0 / 0 PA    1 [id number] 09/25/2023 09/25/2023 Zolpidem Tartrate (Tablet) 30.0 30 10 MG NA SHAYY HOLLIDAY [facility]. Medicare 0 / 0 PA    1 [id number] 08/25/2023 08/25/2023 Zolpidem Tartrate (Tablet) 30.0 30 10 MG NA [facility], .[facility].C. Medicare 0 / 0 PA    1 [id number] 07/25/2023 07/25/2023 ACETAMINOPHEN 325 MG / HYDROcodone BITARTRATE 5 MG ORAL TABLET (Tablet) 30.0 30 5.0 MG/325.0 MG 5.0 [facility] Private Pay 0 / 0 PA    1 [id number] 07/25/2023 07/25/2023 Zolpidem Tartrate (Tablet) 30.0 30 10 MG NA ELSPETH BLACK-MIRELES [facility]. Med

## 2024-03-29 RX ORDER — ZOLPIDEM TARTRATE 10 MG/1
10 TABLET ORAL
Qty: 30 TABLET | Refills: 0 | Status: SHIPPED | OUTPATIENT
Start: 2024-03-29

## 2024-04-25 DIAGNOSIS — F33.0 MILD EPISODE OF RECURRENT MAJOR DEPRESSIVE DISORDER (HCC): ICD-10-CM

## 2024-04-27 DIAGNOSIS — F51.01 PRIMARY INSOMNIA: ICD-10-CM

## 2024-04-29 RX ORDER — ZOLPIDEM TARTRATE 10 MG/1
10 TABLET ORAL
Qty: 30 TABLET | Refills: 0 | Status: SHIPPED | OUTPATIENT
Start: 2024-04-29

## 2024-04-29 NOTE — TELEPHONE ENCOUNTER
1 [id number] 03/29/2024 03/29/2024 Zolpidem Tartrate (Tablet) 30.0 30 10 MG NA [facility]. Medicare 0 / 0 PA    1 [id number] 02/29/2024 02/29/2024 Zolpidem Tartrate (Tablet) 30.0 30 10 MG NA [facility]. Medicare 0 / 0 PA    1 [id number] 01/30/2024 01/30/2024 Zolpidem Tartrate (Tablet) 30.0 30 10 MG NA [facility] Medicare 0 / 0 PA    1 [id number] 12/26/2023 12/26/2023 Zolpidem Tartrate (Tablet) 30.0 30 10 MG [facility] Medicare 0 / 0 PA    1 [id number] 11/27/2023 11/27/2023 Zolpidem Tartrate (Tablet) 30.0 30 10 MG NA [facility] Medicare 0 / 0 PA    1 [id number] 10/26/2023 10/26/2023 Zolpidem Tartrate (Tablet) 30.0 30 10 MG NA [facility] Medicare 0 / 0 PA    1 [id number] 10/19/2023 10/19/2023 HYDROcodone BITARTRATE 5 MG ORAL TABLET/ACETAMINOPHEN 325 MG (Tablet) 30.0 30 5.0 MG/325.0 [facility] 5.[facility] Private Pay 0 / 0 PA    1 [id number] 09/25/2023 09/25/2023 Zolpidem Tartrate (Tablet) 30.0 30 10 MG NA SHAYY HOLLIDAY [facility]. Medicare 0 / 0 PA    1 [id number] 08/25/2023 08/25/2023 Zolpidem Tartrate (Tablet) 30.0 30 10 MG NA [facility] Medicare 0 / 0 PA    1 [id number] 07/25/2023 07/25/2023 HYDROcodone BITARTRATE 5 MG ORAL TABLET/ACETAMINOPHEN 325 MG (Tablet) 30.0 30 5.0 MG/325.0 MG 5.0 [facility] BLACK[facility] PH[facility]YNATE

## 2024-04-29 NOTE — TELEPHONE ENCOUNTER
Patient called to request a refill for their zolpidem advised a refill was requested on 4/27/24 and is pending approval. Patient verbalized understanding and is in agreement.

## 2024-04-30 ENCOUNTER — VBI (OUTPATIENT)
Dept: ADMINISTRATIVE | Facility: OTHER | Age: 54
End: 2024-04-30

## 2024-05-17 DIAGNOSIS — E11.65 CONTROLLED TYPE 2 DIABETES MELLITUS WITH HYPERGLYCEMIA, WITHOUT LONG-TERM CURRENT USE OF INSULIN (HCC): ICD-10-CM

## 2024-05-21 DIAGNOSIS — F33.0 MILD EPISODE OF RECURRENT MAJOR DEPRESSIVE DISORDER (HCC): ICD-10-CM

## 2024-05-29 DIAGNOSIS — F51.01 PRIMARY INSOMNIA: ICD-10-CM

## 2024-05-29 RX ORDER — ZOLPIDEM TARTRATE 10 MG/1
10 TABLET ORAL
Qty: 30 TABLET | Refills: 0 | Status: SHIPPED | OUTPATIENT
Start: 2024-05-29

## 2024-05-29 NOTE — TELEPHONE ENCOUNTER
1 [id number] 04/29/2024 04/29/2024 Zolpidem Tartrate (Tablet) 30.0 30 10 MG NA [facility] [place] 0 / 0 [place]    1 [id number] 03/29/2024 03/29/2024 Zolpidem Tartrate (Tablet) 30.0 30 10 MG [facility] [place] 0 / 0 [place]    1 [id number] 02/29/2024 02/29/2024 Zolpidem Tartrate (Tablet) 30.0 30 10 MG NA [facility] [place] 0 / 0 [place]    1 [id number] 01/30/2024 01/30/2024 Zolpidem Tartrate (Tablet) 30.0 30 10 MG NA [facility] [place] 0 / 0 [place]    1 [id number] 12/26/2023 12/26/2023 Zolpidem Tartrate (Tablet) 30.0 30 10 MG NA [facility] [place] 0 / 0 [place]    1 [id number] 11/27/2023 11/27/2023 Zolpidem Tartrate (Tablet) 30.0 30 10 MG [facility] [place] 0 / 0 [place]    1 [id number] 10/26/2023 10/26/2023 Zolpidem Tartrate (Tablet) 30.0 30 10 MG NA [facility] [place] 0 / 0 [place]    1 [id number] 10/19/2023 10/19/2023 HYDROcodone BITARTRATE 5 MG ORAL TABLET/ACETAMINOPHEN 325 MG (Tablet) 30.0 30 5.0 MG/325.0 MG 5.0 [facility] Private Pay 0 / 0 [place]    1 [id number] 09/25/2023 09/25/2023 Zolpidem Tartrate (Tablet) 30.0 30 10 MG NA SHAYY HOLLIDAY [facility] [place] 0 / 0 [place]    1 [id number] 08/25/2023 08/25/2023 Zolpidem Tartrate (Tablet) 30.0 30 10 MG NA RISHABH JOAQUIN [facility] [place] 0 / 0 [place]

## 2024-05-29 NOTE — TELEPHONE ENCOUNTER
Reason for call:   [x] Refill   [] Prior Auth  [] Other:     Office:   [x] PCP/Provider -   [] Specialty/Provider -     Medication:   Zolpidem 10mg- take 1 tablet by mouth daily at bedtime as needed for sleep      Pharmacy: [facility] [address] [place]    Does the patient have enough for 3 days?   [] Yes   [x] No - Send as HP to POD

## 2024-06-11 ENCOUNTER — OFFICE VISIT (OUTPATIENT)
Dept: FAMILY MEDICINE CLINIC | Facility: CLINIC | Age: 54
End: 2024-06-11
Payer: COMMERCIAL

## 2024-06-11 VITALS
OXYGEN SATURATION: 96 % | SYSTOLIC BLOOD PRESSURE: 128 MMHG | TEMPERATURE: 97.5 F | HEIGHT: 70 IN | HEART RATE: 90 BPM | BODY MASS INDEX: 32.21 KG/M2 | WEIGHT: 225 LBS | DIASTOLIC BLOOD PRESSURE: 74 MMHG

## 2024-06-11 DIAGNOSIS — M54.12 CERVICAL RADICULOPATHY: ICD-10-CM

## 2024-06-11 DIAGNOSIS — Z00.00 MEDICARE ANNUAL WELLNESS VISIT, SUBSEQUENT: Primary | ICD-10-CM

## 2024-06-11 DIAGNOSIS — G83.9 SPASTIC PARESIS (HCC): ICD-10-CM

## 2024-06-11 DIAGNOSIS — S24.109S: ICD-10-CM

## 2024-06-11 DIAGNOSIS — E11.40 TYPE 2 DIABETES MELLITUS WITH DIABETIC NEUROPATHY, WITHOUT LONG-TERM CURRENT USE OF INSULIN (HCC): ICD-10-CM

## 2024-06-11 DIAGNOSIS — E11.65 TYPE 2 DIABETES MELLITUS WITH HYPERGLYCEMIA, WITHOUT LONG-TERM CURRENT USE OF INSULIN (HCC): ICD-10-CM

## 2024-06-11 DIAGNOSIS — F33.1 MODERATE RECURRENT MAJOR DEPRESSION (HCC): ICD-10-CM

## 2024-06-11 DIAGNOSIS — Z12.5 PROSTATE CANCER SCREENING: ICD-10-CM

## 2024-06-11 DIAGNOSIS — J96.10 CHRONIC RESPIRATORY FAILURE, UNSPECIFIED WHETHER WITH HYPOXIA OR HYPERCAPNIA (HCC): ICD-10-CM

## 2024-06-11 LAB — SL AMB POCT HEMOGLOBIN AIC: 8.6 (ref ?–6.5)

## 2024-06-11 PROCEDURE — G0439 PPPS, SUBSEQ VISIT: HCPCS | Performed by: FAMILY MEDICINE

## 2024-06-11 PROCEDURE — 99214 OFFICE O/P EST MOD 30 MIN: CPT | Performed by: FAMILY MEDICINE

## 2024-06-11 PROCEDURE — 83036 HEMOGLOBIN GLYCOSYLATED A1C: CPT | Performed by: FAMILY MEDICINE

## 2024-06-11 RX ORDER — DAPAGLIFLOZIN 10 MG/1
10 TABLET, FILM COATED ORAL DAILY
Qty: 90 TABLET | Refills: 1 | Status: SHIPPED | OUTPATIENT
Start: 2024-06-11

## 2024-06-11 NOTE — PROGRESS NOTES
Ambulatory Visit  Name: Ankush Richmond      : 1970      MRN: [id number]  Encounter Provider: Shanice Mcadams MD  Encounter Date: 2024   Encounter department: [facility]    Assessment & Plan   1. Medicare annual wellness visit, subsequent  2. Type 2 diabetes mellitus with hyperglycemia, without long-term current use of insulin ([facility])  Assessment & Plan:    Lab Results   Component Value Date    HGBA1C 8.6 (A) 2024   Chronic, not at goal and worsening  Continue metformin 1000mg BID   Previously was on Ozempic/Trulicity but did not tolerate  Start Farxiga 10mg qd. Call if cost is an issue   Orders:  -     POCT hemoglobin A1c  -     Lipid Panel with Direct LDL reflex; Future  -     Comprehensive metabolic panel; Future  -     CBC; Future; Expected date: 2024  -     Albumin / creatinine urine ratio; Future; Expected date: 2024  -     UA (URINE) with reflex to Scope; Future  -     TSH, 3rd generation with Free T4 reflex; Future; Expected date: 2024  -     dapagliflozin (Farxiga) 10 MG tablet; Take 1 tablet (10 mg total) by mouth daily  3. Thoracic cord injury without spinal bone injury, sequela (HCC)  Assessment & Plan:  Chronic, stable  Continue to monitor   Orders:  -     CT spine cervical wo contrast; Future; Expected date: 2024  4. Chronic respiratory failure, unspecified whether with hypoxia or hypercapnia (HCC)  Assessment & Plan:  Chronic, stable  Continue to monitor   5. 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HCC)  Assessment & Plan:  Chronic, stable  Managed by pain management with baclofen pump   Orders:  -     CT spine cervical wo contrast; Future; Expected date: 2024  6. Moderate recurrent major depression (HCC)  Assessment & Plan:  Chronic, stable  Continue Zoloft 50mg qd   7. Type 2 diabetes mellitus with diabetic neuropathy, without long-term current use of insulin ([facility])  Assessment & Plan:    Lab Results   Component Value Date    HGBA1C 8.6 (A)  06/11/2024   Chronic, not at goal and worsening  Continue metformin 1000mg BID   Previously was on Ozempic/Trulicity but did not tolerate  Start Farxiga 10mg qd. Call if cost is an issue   8. Prostate cancer screening  -     PSA, Total Screen; Future  9. Cervical radiculopathy  -     CT spine cervical wo contrast; Future; Expected date: 06/11/2024       Preventive health issues were discussed with patient, and age appropriate screening tests were ordered as noted in patient's After Visit Summary. Personalized health advice and appropriate referrals for health education or preventive services given if needed, as noted in patient's After Visit Summary.    History of Present Illness     HPI   Patient Care Team:  Shanice Mcadams MD as PCP - General (Family Medicine)  Ginna Ochoa,     Review of Systems  Medical History Reviewed by provider this encounter:  Tobacco  Allergies  Meds  Problems  Med Hx  Surg Hx  Fam Hx       Annual Wellness Visit Questionnaire   Ankush is here for his Subsequent Wellness visit.     Health Risk Assessment:   Patient rates overall health as good. Patient feels that their physical health rating is same. Patient is satisfied with their life. Eyesight was rated as same. Hearing was rated as same. Patient feels that their emotional and mental health rating is same. Patients states they are sometimes angry. Patient states they are sometimes unusually tired/fatigued. Pain experienced in the last 7 days has been some. Patient's pain rating has been 5/10. Patient states that he has experienced no weight loss or gain in last 6 months.     Depression Screening:   PHQ-9 Score: 0      Fall Risk Screening:   In the past year, patient has experienced: history of falling in past year    Number of falls: 2 or more  Injured during fall?: No    Feels unsteady when standing or walking?: Yes    Worried about falling?: No      Home Safety:  Patient does not have trouble with stairs inside or  outside of their home. Patient has working smoke alarms and has working carbon monoxide detector. Home safety hazards include: none.     Nutrition:   Current diet is Regular.     Medications:   Patient is not currently taking any over-the-counter supplements. Patient is able to manage medications.     Activities of Daily Living (ADLs)/Instrumental Activities of Daily Living (IADLs):   Walk and transfer into and out of bed and chair?: Yes  Dress and groom yourself?: Yes    Bathe or shower yourself?: Yes    Feed yourself? Yes  Do your laundry/housekeeping?: Yes  Manage your money, pay your bills and track your expenses?: Yes  Make your own meals?: Yes    Do your own shopping?: Yes    Previous Hospitalizations:   Any hospitalizations or ED visits within the last 12 months?: No      Advance Care Planning:   Living will: Yes    Durable POA for healthcare: Yes    Advanced directive: Yes      PREVENTIVE SCREENINGS      Cardiovascular Screening:    General: Screening Current      Diabetes Screening:     General: Screening Not Indicated and History Diabetes      Colorectal Cancer Screening:     General: Screening Current      Lung Cancer Screening:     General: Screening Not Indicated      Hepatitis C Screening:    General: Screening Current    Screening, Brief Intervention, and Referral to Treatment (SBIRT)    Screening  Typical number of drinks in a day: 0  Typical number of drinks in a week: 0  Interpretation: Low risk drinking behavior.    Single Item Drug Screening:  How often have you used an illegal drug (including marijuana) or a prescription medication for non-medical reasons in the past year? never    Single Item Drug Screen Score: 0  Interpretation: Negative screen for possible drug use disorder    Review of Current Opioid Use    Opioid Risk Tool (ORT) Interpretation: Complete Opioid Risk Tool (ORT)    Social Determinants of Health     Financial Resource Strain: Low Risk  (6/6/2023)    Overall Financial Resource  "Strain (CARDIA)    • Difficulty of Paying Living Expenses: Not very hard   Food Insecurity: No Food Insecurity (6/11/2024)    Hunger Vital Sign    • Worried About Running Out of Food in the Last Year: Never true    • Ran Out of Food in the Last Year: Never true   Transportation Needs: No Transportation Needs (6/11/2024)    PRAPARE - Transportation    • Lack of Transportation (Medical): No    • Lack of Transportation (Non-Medical): No   Housing Stability: Low Risk  (6/11/2024)    Housing Stability Vital Sign    • Unable to Pay for Housing in the Last Year: No    • Number of Times Moved in the Last Year: 1    • Homeless in the Last Year: No   Utilities: Not At Risk (6/11/2024)    [facility] Utilities    • Threatened with loss of utilities: No     No results found.    Objective     /74   Pulse 90   Temp 97.5 °F (36.4 °C)   Ht 5' 10\" (1.778 m)   Wt 102 kg (225 lb)   SpO2 96%   BMI 32.28 kg/m²     Physical Exam  Vitals and nursing note reviewed.   Constitutional:       General: He is not in acute distress.     Appearance: Normal appearance. He is well-developed and normal weight.   HENT:      Head: Normocephalic and atraumatic.      Right Ear: Tympanic membrane, ear canal and external ear normal.      Left Ear: Tympanic membrane, ear canal and external ear normal.      Nose: Nose normal.      Mouth/Throat:      Mouth: Mucous membranes are moist.      Pharynx: No oropharyngeal exudate.   Eyes:      Extraocular Movements: Extraocular movements intact.      Conjunctiva/sclera: Conjunctivae normal.      Pupils: Pupils are equal, round, and reactive to light.   Neck:      Trachea: No tracheal deviation.   Cardiovascular:      Rate and Rhythm: Normal rate and regular rhythm.      Pulses: Normal pulses.      Heart sounds: Normal heart sounds. No murmur heard.  Pulmonary:      Effort: Pulmonary effort is normal. No respiratory distress.      Breath sounds: Normal breath sounds. No wheezing, rhonchi or rales.   Chest:      " Chest wall: No swelling, crepitus or edema.   Abdominal:      General: Abdomen is protuberant. Bowel sounds are normal. There is no distension.      Palpations: Abdomen is soft. There is no mass.      Tenderness: There is no abdominal tenderness. There is no guarding.          Comments: Baclofen pump as indicated    Musculoskeletal:      Right lower leg: No edema.      Left lower leg: No edema.   Lymphadenopathy:      Cervical: No cervical adenopathy.   Skin:     General: Skin is warm and dry.      Capillary Refill: Capillary refill takes less than 2 seconds.      Findings: No erythema.   Neurological:      General: No focal deficit present.      Mental Status: He is alert and oriented to person, place, and time.      Cranial Nerves: No cranial nerve deficit.      Gait: Gait abnormal (due to spastic paresis).      Deep Tendon Reflexes: Reflexes normal.   Psychiatric:         Mood and Affect: Mood normal.       Administrative Statements

## 2024-06-11 NOTE — PATIENT INSTRUCTIONS
Medicare Preventive Visit Patient Instructions  Thank you for completing your Welcome to Medicare Visit or Medicare Annual Wellness Visit today. Your next wellness visit will be due in one year (6/12/2025).  The screening/preventive services that you may require over the next 5-10 years are detailed below. Some tests may not apply to you based off risk factors and/or age. Screening tests ordered at today's visit but not completed yet may show as past due. Also, please note that scanned in results may not display below.  Preventive Screenings:  Service Recommendations Previous Testing/Comments   Colorectal Cancer Screening  Colonoscopy    Fecal Occult Blood Test (FOBT)/Fecal Immunochemical Test (FIT)  Fecal DNA/Cologuard Test  Flexible Sigmoidoscopy Age: 45-75 years old   Colonoscopy: every 10 years (May be performed more frequently if at higher risk)  OR  FOBT/FIT: every 1 year  OR  Cologuard: every 3 years  OR  Sigmoidoscopy: every 5 years  Screening may be recommended earlier than age 45 if at higher risk for colorectal cancer. Also, an individualized decision between you and your healthcare provider will decide whether screening between the ages of 76-85 would be appropriate. Colonoscopy: Not on file  FOBT/FIT: Not on file  Cologuard: 10/20/2021  Sigmoidoscopy: Not on file          Prostate Cancer Screening Individualized decision between patient and health care provider in men between ages of 55-69   Medicare will cover every 12 months beginning on the day after your 50th birthday PSA: 2.50 ng/mL           Hepatitis C Screening Once for adults born between 1945 and 1965  More frequently in patients at high risk for Hepatitis C Hep C Antibody: 06/01/2023        Diabetes Screening 1-2 times per year if you're at risk for diabetes or have pre-diabetes Fasting glucose: 174 mg/dL (6/1/2023)  A1C: 7.7 (3/13/2023)      Cholesterol Screening Once every 5 years if you don't have a lipid disorder. May order more often  based on risk factors. Lipid panel: 06/01/2023         Other Preventive Screenings Covered by Medicare:  Abdominal Aortic Aneurysm (AAA) Screening: covered once if your at risk. You're considered to be at risk if you have a family history of AAA or a male between the age of 65-75 who smoking at least 100 cigarettes in your lifetime.  Lung Cancer Screening: covers low dose CT scan once per year if you meet all of the following conditions: (1) Age 55-77; (2) No signs or symptoms of lung cancer; (3) Current smoker or have quit smoking within the last 15 years; (4) You have a tobacco smoking history of at least 20 pack years (packs per day x number of years you smoked); (5) You get a written order from a healthcare provider.  Glaucoma Screening: covered annually if you're considered high risk: (1) You have diabetes OR (2) Family history of glaucoma OR (3)  aged 50 and older OR (4)  American aged 65 and older  Osteoporosis Screening: covered every 2 years if you meet one of the following conditions: (1) Have a vertebral abnormality; (2) On glucocorticoid therapy for more than 3 months; (3) Have primary hyperparathyroidism; (4) On osteoporosis medications and need to assess response to drug therapy.  HIV Screening: covered annually if you're between the age of 15-65. Also covered annually if you are younger than 15 and older than 65 with risk factors for HIV infection. For pregnant patients, it is covered up to 3 times per pregnancy.    Immunizations:  Immunization Recommendations   Influenza Vaccine Annual influenza vaccination during flu season is recommended for all persons aged >= 6 months who do not have contraindications   Pneumococcal Vaccine   * Pneumococcal conjugate vaccine = PCV13 (Prevnar 13), PCV15 (Vaxneuvance), PCV20 (Prevnar 20)  * Pneumococcal polysaccharide vaccine = PPSV23 (Pneumovax) Adults 19-63 yo with certain risk factors or if 65+ yo  If never received any pneumonia vaccine:  recommend Prevnar 20 (PCV20)  Give PCV20 if previously received 1 dose of PCV13 or PPSV23   Hepatitis B Vaccine 3 dose series if at intermediate or high risk (ex: diabetes, end stage renal disease, liver disease)   Respiratory syncytial virus (RSV) Vaccine - COVERED BY MEDICARE PART D  * RSVPreF3 (Arexvy) CDC recommends that adults 60 years of age and older may receive a single dose of RSV vaccine using shared clinical decision-making (SCDM)   Tetanus (Td) Vaccine - COST NOT COVERED BY MEDICARE PART B Following completion of primary series, a booster dose should be given every 10 years to maintain immunity against tetanus. Td may also be given as tetanus wound prophylaxis.   Tdap Vaccine - COST NOT COVERED BY MEDICARE PART B Recommended at least once for all adults. For pregnant patients, recommended with each pregnancy.   Shingles Vaccine (Shingrix) - COST NOT COVERED BY MEDICARE PART B  2 shot series recommended in those 19 years and older who have or will have weakened immune systems or those 50 years and older     Health Maintenance Due:      Topic Date Due   • HIV Screening  Never done   • Colorectal Cancer Screening  10/20/2024   • Hepatitis C Screening  Completed     Immunizations Due:      Topic Date Due   • Pneumococcal Vaccine: Pediatrics (0 to 5 Years) and At-Risk Patients (6 to 64 Years) (1 of 2 - PCV) 05/12/1976   • COVID-19 Vaccine (1 - 2023-24 season) Never done   • Influenza Vaccine (Season Ended) 09/01/2024     Advance Directives   What are advance directives?  Advance directives are legal documents that state your wishes and plans for medical care. These plans are made ahead of time in case you lose your ability to make decisions for yourself. Advance directives can apply to any medical decision, such as the treatments you want, and if you want to donate organs.   What are the types of advance directives?  There are many types of advance directives, and each state has rules about how to use them.  You may choose a combination of any of the following:  Living will:  This is a written record of the treatment you want. You can also choose which treatments you do not want, which to limit, and which to stop at a certain time. This includes surgery, medicine, IV fluid, and tube feedings.   Durable power of  for healthcare (DPAHC):  This is a written record that states who you want to make healthcare choices for you when you are unable to make them for yourself. This person, called a proxy, is usually a family member or a friend. You may choose more than 1 proxy.  Do not resuscitate (DNR) order:  A DNR order is used in case your heart stops beating or you stop breathing. It is a request not to have certain forms of treatment, such as CPR. A DNR order may be included in other types of advance directives.  Medical directive:  This covers the care that you want if you are in a coma, near death, or unable to make decisions for yourself. You can list the treatments you want for each condition. Treatment may include pain medicine, surgery, blood transfusions, dialysis, IV or tube feedings, and a ventilator (breathing machine).  Values history:  This document has questions about your views, beliefs, and how you feel and think about life. This information can help others choose the care that you would choose.  Why are advance directives important?  An advance directive helps you control your care. Although spoken wishes may be used, it is better to have your wishes written down. Spoken wishes can be misunderstood, or not followed. Treatments may be given even if you do not want them. An advance directive may make it easier for your family to make difficult choices about your care.   Weight Management   Why it is important to manage your weight:  Being overweight increases your risk of health conditions such as heart disease, high blood pressure, type 2 diabetes, and certain types of cancer. It can also increase your  risk for osteoarthritis, sleep apnea, and other respiratory problems. Aim for a slow, steady weight loss. Even a small amount of weight loss can lower your risk of health problems.  How to lose weight safely:  A safe and healthy way to lose weight is to eat fewer calories and get regular exercise. You can lose up about 1 pound a week by decreasing the number of calories you eat by 500 calories each day.   Healthy meal plan for weight management:  A healthy meal plan includes a variety of foods, contains fewer calories, and helps you stay healthy. A healthy meal plan includes the following:  Eat whole-grain foods more often.  A healthy meal plan should contain fiber. Fiber is the part of grains, fruits, and vegetables that is not broken down by your body. Whole-grain foods are healthy and provide extra fiber in your diet. Some examples of whole-grain foods are whole-wheat breads and pastas, oatmeal, brown rice, and bulgur.  Eat a variety of vegetables every day.  Include dark, leafy greens such as spinach, kale, jay greens, and mustard greens. Eat yellow and orange vegetables such as carrots, sweet potatoes, and winter squash.   Eat a variety of fruits every day.  Choose fresh or canned fruit (canned in its own juice or light syrup) instead of juice. Fruit juice has very little or no fiber.  Eat low-fat dairy foods.  Drink fat-free (skim) milk or 1% milk. Eat fat-free yogurt and low-fat cottage cheese. Try low-fat cheeses such as mozzarella and other reduced-fat cheeses.  Choose meat and other protein foods that are low in fat.  Choose beans or other legumes such as split peas or lentils. Choose fish, skinless poultry (chicken or turkey), or lean cuts of red meat (beef or pork). Before you cook meat or poultry, cut off any visible fat.   Use less fat and oil.  Try baking foods instead of frying them. Add less fat, such as margarine, sour cream, regular salad dressing and mayonnaise to foods. Eat fewer high-fat  foods. Some examples of high-fat foods include french fries, doughnuts, ice cream, and cakes.  Eat fewer sweets.  Limit foods and drinks that are high in sugar. This includes candy, cookies, regular soda, and sweetened drinks.  Exercise:  Exercise at least 30 minutes per day on most days of the week. Some examples of exercise include walking, biking, dancing, and swimming. You can also fit in more physical activity by taking the stairs instead of the elevator or parking farther away from stores. Ask your healthcare provider about the best exercise plan for you.      © Copyright [employer] 2018 Information is for End User's use only and may not be sold, redistributed or otherwise used for commercial purposes. All illustrations and images included in CareNotes® are the copyrighted property of [employer] Inc[employer] or [employer]

## 2024-06-21 DIAGNOSIS — E11.65 CONTROLLED TYPE 2 DIABETES MELLITUS WITH HYPERGLYCEMIA, WITHOUT LONG-TERM CURRENT USE OF INSULIN (HCC): ICD-10-CM

## 2024-06-21 RX ORDER — MELOXICAM 15 MG/1
TABLET ORAL
Qty: 30 TABLET | Refills: 0 | Status: SHIPPED | OUTPATIENT
Start: 2024-06-21

## 2024-06-25 DIAGNOSIS — F51.01 PRIMARY INSOMNIA: ICD-10-CM

## 2024-06-25 RX ORDER — ZOLPIDEM TARTRATE 10 MG/1
10 TABLET ORAL
Qty: 30 TABLET | Refills: 0 | Status: SHIPPED | OUTPATIENT
Start: 2024-06-25

## 2024-06-25 NOTE — TELEPHONE ENCOUNTER
1 [id number] 05/29/2024 05/29/2024 Zolpidem Tartrate (Tablet) 30.0 30 10 MG [facility] [place] 0 / 0 [place]    1 [id number] 04/29/2024 04/29/2024 Zolpidem Tartrate (Tablet) 30.0 30 10 MG NA [facility] [place] 0 / 0 [place]    1 [id number] 03/29/2024 03/29/2024 Zolpidem Tartrate (Tablet) 30.0 30 10 MG [facility] Medicare 0 / 0 [place]    1 [id number] 02/29/2024 02/29/2024 Zolpidem Tartrate (Tablet) 30.0 30 10 MG [facility] Medicare 0 / 0 [place]    1 [id number] 01/30/2024 01/30/2024 Zolpidem Tartrate (Tablet) 30.0 30 10 MG [facility] Medicare 0 / 0 [place]    1 [id number] 12/26/2023 12/26/2023 Zolpidem Tartrate (Tablet) 30.0 30 10 MG [facility] Medicare 0 / 0 [place]    1 [id number] 11/27/2023 11/27/2023 Zolpidem Tartrate (Tablet) 30.0 30 10 MG NA [facility] Medicare 0 / 0 [place]    1 [id number] 10/26/2023 10/26/2023 Zolpidem Tartrate (Tablet) 30.0 [address] NA [facility] Medicare 0 / 0 [place]    1 [id number] 10/19/2023 10/19/2023 HYDROcodone BITARTRATE 5 MG ORAL TABLET/ACETAMINOPHEN 325 MG (Tablet) 30.0 30 5.0 MG/325.0 MG 5.0 [facility] Private Pay 0 / 0 [place]    1 [id number] 09/25/2023 09/25/2023 Zolpidem Tartrate (Tablet) 30.0 30 10 MG NA SHAYY HOLLIDAY [facility] Med

## 2024-06-28 ENCOUNTER — TELEPHONE (OUTPATIENT)
Age: 54
End: 2024-06-28

## 2024-06-28 ENCOUNTER — HOSPITAL ENCOUNTER (OUTPATIENT)
Dept: CT IMAGING | Facility: HOSPITAL | Age: 54
Discharge: HOME | End: 2024-06-28
Attending: FAMILY MEDICINE

## 2024-06-28 DIAGNOSIS — M54.12 CERVICAL RADICULOPATHY: ICD-10-CM

## 2024-06-28 DIAGNOSIS — S24.109S: ICD-10-CM

## 2024-06-28 DIAGNOSIS — G83.9 SPASTIC PARESIS (HCC): ICD-10-CM

## 2024-06-28 NOTE — TELEPHONE ENCOUNTER
Patient is calling in stating that he went to get his CT scan done today and was informed that his insurance company would not pay for it and states its not medically necessary    Please advise

## 2024-07-01 NOTE — TELEPHONE ENCOUNTER
Please let him know his insurance denied due to wanting 6 weeks PT first (which is ridiculous given his history of spinal cord injury). Recommend he reach out to his pain management doc - if they wish to get repeat CT it will likely be approved if ordered by them.

## 2024-07-25 DIAGNOSIS — F51.01 PRIMARY INSOMNIA: ICD-10-CM

## 2024-07-25 RX ORDER — ZOLPIDEM TARTRATE 10 MG/1
10 TABLET ORAL
Qty: 30 TABLET | Refills: 0 | Status: SHIPPED | OUTPATIENT
Start: 2024-07-25

## 2024-07-25 NOTE — TELEPHONE ENCOUNTER
1 [id number] 06/26/2024 06/25/2024 Zolpidem Tartrate (Tablet) 30.0 30 10 MG NA [facility] [place]    1 [id number] 05/29/2024 05/29/2024 Zolpidem Tartrate (Tablet) 30.0 30 10 MG [facility] [place]    1 [id number] 04/29/2024 04/29/2024 Zolpidem Tartrate (Tablet) 30.0 30 10 MG NA [facility] [place]    1 [id number] 03/29/2024 03/29/2024 Zolpidem Tartrate (Tablet) 30.0 30 10 MG [facility] [place]    1 [id number] 02/29/2024 02/29/2024 Zolpidem Tartrate (Tablet) 30.0 30 10 MG NA [facility] [place]    1 [id number] 01/30/2024 01/30/2024 Zolpidem Tartrate (Tablet) 30.0 30 10 MG [facility] [place]    1 [id number] 12/26/2023 12/26/2023 Zolpidem Tartrate (Tablet) 30.0 30 10 MG [facility] [place]    1 [id number] 11/27/2023 11/27/2023 Zolpidem Tartrate (Tablet) 30.0 30 10 MG [facility] [place]    1 [id number] 10/26/2023 10/26/2023 Zolpidem Tartrate (Tablet) 30.0 30 10 MG NA [facility] [place]    1 [id number] 10/19/2023 10/19/2023 HYDROcodone BITARTRATE 5 MG ORAL TABLET/ACETAMINOPHEN 325 MG (Tablet) 30.0 30 325 MG-5 MG 5.0 [facility]QU[facility] [place] 0 / 0 [place]

## 2024-07-31 ENCOUNTER — APPOINTMENT (OUTPATIENT)
Dept: LAB | Facility: CLINIC | Age: 54
End: 2024-07-31
Payer: COMMERCIAL

## 2024-07-31 DIAGNOSIS — E11.65 TYPE 2 DIABETES MELLITUS WITH HYPERGLYCEMIA, WITHOUT LONG-TERM CURRENT USE OF INSULIN (HCC): ICD-10-CM

## 2024-07-31 DIAGNOSIS — Z12.5 PROSTATE CANCER SCREENING: ICD-10-CM

## 2024-07-31 LAB
ALBUMIN SERPL BCG-MCNC: 4.2 G/DL (ref 3.5–5)
ALP SERPL-CCNC: 105 U/L (ref 34–104)
ALT SERPL W P-5'-P-CCNC: 19 U/L (ref 7–52)
ANION GAP SERPL CALCULATED.3IONS-SCNC: 10 MMOL/L (ref 4–13)
AST SERPL W P-5'-P-CCNC: 13 U/L (ref 13–39)
BILIRUB SERPL-MCNC: 0.53 MG/DL (ref 0.2–1)
BUN SERPL-MCNC: 11 MG/DL (ref 5–25)
CALCIUM SERPL-MCNC: 9.8 MG/DL (ref 8.4–10.2)
CHLORIDE SERPL-SCNC: 99 MMOL/L (ref 96–108)
CHOLEST SERPL-MCNC: 267 MG/DL
CO2 SERPL-SCNC: 31 MMOL/L (ref 21–32)
CREAT SERPL-MCNC: 0.7 MG/DL (ref 0.6–1.3)
ERYTHROCYTE [DISTWIDTH] IN BLOOD BY AUTOMATED COUNT: 12.7 % (ref 11.6–15.1)
GFR SERPL CREATININE-BSD FRML MDRD: 105 ML/MIN/1.73SQ M
GLUCOSE P FAST SERPL-MCNC: 130 MG/DL (ref 65–99)
HCT VFR BLD AUTO: 50.9 % (ref 36.5–49.3)
HDLC SERPL-MCNC: 62 MG/DL
HGB BLD-MCNC: 15.5 G/DL (ref 12–17)
LDLC SERPL CALC-MCNC: 175 MG/DL (ref 0–100)
MCH RBC QN AUTO: 28 PG (ref 26.8–34.3)
MCHC RBC AUTO-ENTMCNC: 30.5 G/DL (ref 31.4–37.4)
MCV RBC AUTO: 92 FL (ref 82–98)
PLATELET # BLD AUTO: 237 THOUSANDS/UL (ref 149–390)
PMV BLD AUTO: 9.9 FL (ref 8.9–12.7)
POTASSIUM SERPL-SCNC: 4.2 MMOL/L (ref 3.5–5.3)
PROT SERPL-MCNC: 7.4 G/DL (ref 6.4–8.4)
PSA SERPL-MCNC: 5.01 NG/ML (ref 0–4)
RBC # BLD AUTO: 5.53 MILLION/UL (ref 3.88–5.62)
SODIUM SERPL-SCNC: 140 MMOL/L (ref 135–147)
TRIGL SERPL-MCNC: 152 MG/DL
TSH SERPL DL<=0.05 MIU/L-ACNC: 3.73 UIU/ML (ref 0.45–4.5)
WBC # BLD AUTO: 5.99 THOUSAND/UL (ref 4.31–10.16)

## 2024-07-31 PROCEDURE — 80053 COMPREHEN METABOLIC PANEL: CPT

## 2024-07-31 PROCEDURE — 36415 COLL VENOUS BLD VENIPUNCTURE: CPT

## 2024-07-31 PROCEDURE — 84443 ASSAY THYROID STIM HORMONE: CPT

## 2024-07-31 PROCEDURE — 80061 LIPID PANEL: CPT

## 2024-07-31 PROCEDURE — G0103 PSA SCREENING: HCPCS

## 2024-07-31 PROCEDURE — 85027 COMPLETE CBC AUTOMATED: CPT

## 2024-08-02 ENCOUNTER — TELEPHONE (OUTPATIENT)
Dept: FAMILY MEDICINE CLINIC | Facility: CLINIC | Age: 54
End: 2024-08-02

## 2024-08-02 DIAGNOSIS — R97.20 ELEVATED PSA: Primary | ICD-10-CM

## 2024-08-02 NOTE — TELEPHONE ENCOUNTER
----- Message from Shanice Mcadams MD sent at 8/2/2024  9:21 AM EDT -----  Call pt.  -cholesterol significantly worse. Has he been taking his lipitor?  - PSA borderline elevated - recommend follow up with urology.  Other labs stable

## 2024-08-05 NOTE — TELEPHONE ENCOUNTER
Spoke with patient. Verbalized understanding, states he has not been taking Lipitor faithfully but he will start now.

## 2024-08-07 ENCOUNTER — APPOINTMENT (OUTPATIENT)
Dept: LAB | Facility: CLINIC | Age: 54
End: 2024-08-07
Payer: COMMERCIAL

## 2024-08-07 DIAGNOSIS — E11.65 CONTROLLED TYPE 2 DIABETES MELLITUS WITH HYPERGLYCEMIA, WITHOUT LONG-TERM CURRENT USE OF INSULIN (HCC): ICD-10-CM

## 2024-08-07 LAB
BACTERIA UR QL AUTO: NORMAL /HPF
BILIRUB UR QL STRIP: NEGATIVE
CLARITY UR: CLEAR
COLOR UR: ABNORMAL
CREAT UR-MCNC: 99 MG/DL
GLUCOSE UR STRIP-MCNC: ABNORMAL MG/DL
HGB UR QL STRIP.AUTO: NEGATIVE
KETONES UR STRIP-MCNC: NEGATIVE MG/DL
LEUKOCYTE ESTERASE UR QL STRIP: ABNORMAL
MICROALBUMIN UR-MCNC: <7 MG/L
NITRITE UR QL STRIP: NEGATIVE
NON-SQ EPI CELLS URNS QL MICRO: NORMAL /HPF
PH UR STRIP.AUTO: 6 [PH]
PROT UR STRIP-MCNC: NEGATIVE MG/DL
RBC #/AREA URNS AUTO: NORMAL /HPF
SP GR UR STRIP.AUTO: 1.03 (ref 1–1.03)
UROBILINOGEN UR STRIP-ACNC: <2 MG/DL
WBC #/AREA URNS AUTO: NORMAL /HPF

## 2024-08-07 PROCEDURE — 82043 UR ALBUMIN QUANTITATIVE: CPT

## 2024-08-07 PROCEDURE — 81001 URINALYSIS AUTO W/SCOPE: CPT

## 2024-08-07 PROCEDURE — 82570 ASSAY OF URINE CREATININE: CPT

## 2024-08-07 RX ORDER — MELOXICAM 15 MG/1
TABLET ORAL
Qty: 30 TABLET | Refills: 2 | Status: SHIPPED | OUTPATIENT
Start: 2024-08-07

## 2024-08-23 ENCOUNTER — TELEPHONE (OUTPATIENT)
Age: 54
End: 2024-08-23

## 2024-08-23 DIAGNOSIS — E11.65 TYPE 2 DIABETES MELLITUS WITH HYPERGLYCEMIA, WITHOUT LONG-TERM CURRENT USE OF INSULIN (HCC): Primary | ICD-10-CM

## 2024-08-23 DIAGNOSIS — F51.01 PRIMARY INSOMNIA: ICD-10-CM

## 2024-08-23 RX ORDER — BLOOD SUGAR DIAGNOSTIC
STRIP MISCELLANEOUS
Qty: 100 EACH | Refills: 3 | Status: SHIPPED | OUTPATIENT
Start: 2024-08-23

## 2024-08-23 RX ORDER — ZOLPIDEM TARTRATE 10 MG/1
10 TABLET ORAL
Qty: 30 TABLET | Refills: 0 | Status: SHIPPED | OUTPATIENT
Start: 2024-08-23

## 2024-08-23 RX ORDER — LANCETS 33 GAUGE
EACH MISCELLANEOUS
Qty: 100 EACH | Refills: 3 | Status: SHIPPED | OUTPATIENT
Start: 2024-08-23

## 2024-08-23 RX ORDER — BLOOD-GLUCOSE METER
KIT MISCELLANEOUS
Qty: 1 KIT | Refills: 0 | Status: SHIPPED | OUTPATIENT
Start: 2024-08-23

## 2024-08-23 NOTE — TELEPHONE ENCOUNTER
Reason for call:   [x] Refill   [] Prior Auth  [] Other:     Office:   [x] PCP/Provider - Shanice Mcadams MD -Family Medicine [place]   [] Specialty/Provider -     Medication:     zolpidem (AMBIEN) 10 mg tablet         TAKE 1 TABLET BY MOUTH DAILY AT BEDTIME AS NEEDED FOR SLEEP.       Pharmacy: [facility]/pharmacy #[id number] - [place], [place] - [address]     Does the patient have enough for 3 days?   [x] Yes   [] No - Send as HP to POD

## 2024-08-23 NOTE — TELEPHONE ENCOUNTER
Patient called rx refill for refill on one touch ultra test strips ( no longer on current meds list) to the [facility] on [address]

## 2024-08-23 NOTE — TELEPHONE ENCOUNTER
1 [id number] 07/25/2024 07/25/2024 Zolpidem Tartrate (Tablet) 30.0 30 10 MG [facility] [place]    1 [id number] 06/26/2024 06/25/2024 Zolpidem Tartrate (Tablet) 30.0 30 10 MG [facility] [place]    1 [id number] 05/29/2024 05/29/2024 Zolpidem Tartrate (Tablet) 30.0 30 10 MG [facility] [place]    1 [id number] 04/29/2024 04/29/2024 Zolpidem Tartrate (Tablet) 30.0 30 10 MG [facility] [place]    1 [id number] 03/29/2024 03/29/2024 Zolpidem Tartrate (Tablet) 30.0 30 10 MG [facility] [place]    1 [id number] 02/29/2024 02/29/2024 Zolpidem Tartrate (Tablet) 30.0 30 10 MG [facility] [place]    1 [id number] 01/30/2024 01/30/2024 Zolpidem Tartrate (Tablet) 30.0 30 10 MG [facility] [place]    1 [id number] 12/26/2023 12/26/2023 Zolpidem Tartrate (Tablet) 30.0 30 10 MG [facility] [place]    1 [id number] 11/27/2023 11/27/2023 Zolpidem Tartrate (Tablet) 30.0 30 10 MG [facility] [place]    1 [id number] 10/26/2023 10/26/2023 Zolpidem Tartrate (Tablet) 30.0 30 10 MG NA RISHABH JOAQUIN [facility] Medicare 0 / 0

## 2024-09-03 DIAGNOSIS — G83.9 SPASTIC PARESIS (HCC): ICD-10-CM

## 2024-09-03 RX ORDER — HYDROCODONE BITARTRATE AND ACETAMINOPHEN 5; 325 MG/1; MG/1
1 TABLET ORAL DAILY PRN
Qty: 30 TABLET | Refills: 0 | Status: SHIPPED | OUTPATIENT
Start: 2024-09-03

## 2024-09-03 NOTE — TELEPHONE ENCOUNTER
Pt states he is having a shingles flare up and needs his Hydrocodone as well as his Valacyclovir sent into the pharmacy. I cannot find his Valacyclovir on his active or inactive med list but pt states PCP has filled it before. Please determine if appropriate and contact pt if needed.    Reason for call:   [x] Refill   [] Prior Auth  [] Other:     Office:   [x] PCP/Provider - [facility] [place]   [] Specialty/Provider -     Medication:   HYDROcodone-acetaminophen (NORCO) 5-325 mg per tablet - Take 1 tablet by mouth daily as needed (severe pain) Max Daily Amount: 1 tablet     VALACYCLOVIR    Pharmacy:   [facility]/pharmacy #[id number] - [place], [place] - [address]     Does the patient have enough for 3 days?   [] Yes   [x] No - Send as HP to POD

## 2024-09-03 NOTE — TELEPHONE ENCOUNTER
1 [id number] 08/23/2024 08/23/2024 Zolpidem Tartrate (Tablet) 30.0 30 10 MG [facility] [place]    1 [id number] 07/25/2024 07/25/2024 Zolpidem Tartrate (Tablet) 30.0 30 10 MG [facility] [place]    1 [id number] 06/26/2024 06/25/2024 Zolpidem Tartrate (Tablet) 30.0 30 10 MG [facility] [place]    1 [id number] 05/29/2024 05/29/2024 Zolpidem Tartrate (Tablet) 30.0 30 10 MG [facility] [place]    1 [id number] 04/29/2024 04/29/2024 Zolpidem Tartrate (Tablet) 30.0 30 10 MG [facility] [place]    1 [id number] 03/29/2024 03/29/2024 Zolpidem Tartrate (Tablet) 30.0 30 10 MG [facility] [place]    1 [id number] 02/29/2024 02/29/2024 Zolpidem Tartrate (Tablet) 30.0 30 10 MG [facility] [place]    1 [id number] 01/30/2024 01/30/2024 Zolpidem Tartrate (Tablet) 30.0 30 10 MG [facility] [place]    1 [id number] 12/26/2023 12/26/2023 Zolpidem Tartrate (Tablet) 30.0 30 10 MG [facility] [place]    1 [id number] 11/27/2023 11/27/2023 Zolpidem Tartrate (Tablet) 30.0 30 10 MG NA [facility] [place] / [place]

## 2024-09-03 NOTE — TELEPHONE ENCOUNTER
10/19/2023 10/19/2023 1 Hydrocodone-Acetamin 5-325 Mg 30.00 30 [facility] [id number] Pen ([phone]) 0 5.00 [facility] Private Pay [place]

## 2024-09-04 ENCOUNTER — OFFICE VISIT (OUTPATIENT)
Dept: URGENT CARE | Facility: MEDICAL CENTER | Age: 54
End: 2024-09-04
Payer: COMMERCIAL

## 2024-09-04 ENCOUNTER — RA CDI HCC (OUTPATIENT)
Dept: OTHER | Facility: HOSPITAL | Age: 54
End: 2024-09-04

## 2024-09-04 VITALS
WEIGHT: 219.8 LBS | OXYGEN SATURATION: 93 % | HEART RATE: 102 BPM | DIASTOLIC BLOOD PRESSURE: 112 MMHG | RESPIRATION RATE: 18 BRPM | SYSTOLIC BLOOD PRESSURE: 155 MMHG | HEIGHT: 70 IN | TEMPERATURE: 97.4 F | BODY MASS INDEX: 31.47 KG/M2

## 2024-09-04 DIAGNOSIS — B02.9 HERPES ZOSTER WITHOUT COMPLICATION: Primary | ICD-10-CM

## 2024-09-04 PROCEDURE — 99213 OFFICE O/P EST LOW 20 MIN: CPT | Performed by: PHYSICIAN ASSISTANT

## 2024-09-04 PROCEDURE — S9083 URGENT CARE CENTER GLOBAL: HCPCS | Performed by: PHYSICIAN ASSISTANT

## 2024-09-04 RX ORDER — MELOXICAM 15 MG/1
15 TABLET ORAL DAILY
Qty: 10 TABLET | Refills: 0 | Status: SHIPPED | OUTPATIENT
Start: 2024-09-04 | End: 2024-09-06 | Stop reason: SDUPTHER

## 2024-09-04 RX ORDER — VALACYCLOVIR HYDROCHLORIDE 1 G/1
1000 TABLET, FILM COATED ORAL 2 TIMES DAILY
Qty: 14 TABLET | Refills: 0 | Status: SHIPPED | OUTPATIENT
Start: 2024-09-04 | End: 2024-09-11

## 2024-09-04 NOTE — PATIENT INSTRUCTIONS
Shingles  Valtrex as directed  Follow up with PCP in 3-5 days.  Proceed to  ER if symptoms worsen.

## 2024-09-04 NOTE — PROGRESS NOTES
[facility]        NAME: Ankush [place] is a 54 y.o. male  : 1970    MRN: [id number]  DATE: 2024  TIME: 2:20 PM    Assessment and Plan   Herpes zoster without complication [B02.9]  1. Herpes zoster without complication              Patient Instructions     Shingles  Valtrex as directed  Follow up with PCP in 3-5 days.  Proceed to  ER if symptoms worsen.    Chief Complaint     Chief Complaint   Patient presents with    Rash     Started 2 days ago with rash on back shoulder blade area.  Painful rash.         History of Present Illness       54-year-old male who presents complaining of painful rash to the right side of his upper back.  Patient states he had similar symptoms in the past with previous episodes of shingles.  Patient has a history of diabetes so steroids will be admitted at this time.  Patient states he has an appointment with PCP in 2 weeks.    Rash  Pertinent negatives include no cough or shortness of breath.       Review of Systems   Review of Systems   Constitutional: Negative.    HENT: Negative.     Eyes: Negative.    Respiratory: Negative.  Negative for apnea, cough, choking, chest tightness, shortness of breath, wheezing and stridor.    Cardiovascular: Negative.  Negative for chest pain.   Skin:  Positive for rash.         Current Medications       Current Outpatient Medications:     baclofen (LIORESAL) 10 MG/20ML, by Intrathecal route if needed, Disp: , Rfl:     baclofen 10 mg tablet, TAKE 1 TABLET BY MOUTH THREE TIMES A DAY, Disp: 270 tablet, Rfl: 1    Blood Glucose Monitoring Suppl (OneTouch Verio Reflect) w/Device KIT, Check blood sugars once daily. Please substitute with appropriate alternative as covered by patient's insurance. Dx: E11.65, Disp: 1 kit, Rfl: 0    dapagliflozin (Farxiga) 10 MG tablet, Take 1 tablet (10 mg total) by mouth daily, Disp: 90 tablet, Rfl: 1    glucose blood (OneTouch Verio) test strip, Check blood sugars once daily. Please substitute with  appropriate alternative as covered by patient's insurance. Dx: E11.65, Disp: 100 each, Rfl: 3    HYDROcodone-acetaminophen (NORCO) 5-325 mg per tablet, Take 1 tablet by mouth daily as needed (severe pain) Max Daily Amount: 1 tablet, Disp: 30 tablet, Rfl: 0    meloxicam (MOBIC) 15 mg tablet, TAKE 1 TABLET BY MOUTH EVERY DAY, Disp: 30 tablet, Rfl: 2    metFORMIN (GLUCOPHAGE) 1000 MG tablet, TAKE 1 TABLET BY MOUTH TWICE A DAY WITH FOOD, Disp: 180 tablet, Rfl: 1    OneTouch Delica Lancets 33G MISC, Check blood sugars once daily. Please substitute with appropriate alternative as covered by patient's insurance. Dx: E11.65, Disp: 100 each, Rfl: 3    sertraline (ZOLOFT) 50 mg tablet, TAKE 1 TABLET BY MOUTH EVERY DAY, Disp: 90 tablet, Rfl: 1    zolpidem (AMBIEN) 10 mg tablet, Take 1 tablet (10 mg total) by mouth daily at bedtime as needed for sleep, Disp: 30 tablet, Rfl: 0    atorvastatin (LIPITOR) 40 mg tablet, Take 40 mg by mouth daily, Disp: , Rfl:     ibuprofen (MOTRIN) 600 mg tablet, Take 1 tablet by mouth every 8 (eight) hours as needed for moderate pain (pain) for up to 30 days, Disp: 15 tablet, Rfl: 0    Current Allergies     Allergies as of 09/04/2024 - Reviewed 09/04/2024   Allergen Reaction Noted    Gadolinium derivatives Itching 09/06/2020    Ozempic (0.25 or 0.5 mg-dose) [semaglutide(0.25 or 0.5mg-dos)] Headache 06/06/2023    Trulicity [dulaglutide] Dizziness 06/11/2024            The following portions of the patient's history were reviewed and updated as appropriate: allergies, current medications, past family history, past medical history, past social history, past surgical history and problem list.     Past Medical History:   Diagnosis Date    Diabetes mellitus (HCC)        Past Surgical History:   Procedure Laterality Date    BACK SURGERY      KNEE SURGERY Left     VENTRAL HERNIA REPAIR         Family History   Problem Relation Age of Onset    Diabetes Mother     Lung cancer Father     Skin cancer Father   "   Prostate cancer Father     Colon cancer Neg Hx     Hypertension Neg Hx     Hyperlipidemia Neg Hx     Thyroid disease Neg Hx          Medications have been verified.        Objective   BP (!) 155/112   Pulse 102   Temp (!) 97.4 °F (36.3 °C) (Temporal)   Resp 18   Ht 5' 10\" (1.778 m)   Wt 99.7 kg (219 lb 12.8 oz)   SpO2 93%   BMI 31.54 kg/m²        Physical Exam     Physical Exam  Constitutional:       General: He is not in acute distress.     Appearance: He is well-developed. He is not diaphoretic.   Cardiovascular:      Rate and Rhythm: Normal rate and regular rhythm.      Heart sounds: Normal heart sounds.   Pulmonary:      Effort: Pulmonary effort is normal. No respiratory distress.      Breath sounds: Normal breath sounds. No wheezing or rales.   Chest:      Chest wall: No tenderness.   Musculoskeletal:      Cervical back: Normal range of motion and neck supple.   Lymphadenopathy:      Cervical: No cervical adenopathy.   Skin:                         "

## 2024-09-06 ENCOUNTER — TELEPHONE (OUTPATIENT)
Dept: URGENT CARE | Facility: MEDICAL CENTER | Age: 54
End: 2024-09-06

## 2024-09-06 NOTE — TELEPHONE ENCOUNTER
Patient called stating previous provider, DARCIE Arroyo's script never went to the pharmacy.    This provider called [facility] in [place], [place]. Spoke to pharmacy staff, stated script from DARCIE Arroyo was placed on hold due to it being too early for patient to  medication.     Patient picked up a 30 day supply script on 8/13/24 from PCP.    Discontinued DARCIE Arroyo script, because pt has refills noted on original script from PCP.     Called pt, christo Zac[facility] will [facility] original script, discussed with patient he should only be taking medication as directed. Patient verbalized understanding. All questions answered at this time.

## 2024-09-11 ENCOUNTER — OFFICE VISIT (OUTPATIENT)
Dept: FAMILY MEDICINE CLINIC | Facility: CLINIC | Age: 54
End: 2024-09-11
Payer: COMMERCIAL

## 2024-09-11 ENCOUNTER — TELEPHONE (OUTPATIENT)
Dept: ADMINISTRATIVE | Facility: OTHER | Age: 54
End: 2024-09-11

## 2024-09-11 VITALS
HEIGHT: 70 IN | DIASTOLIC BLOOD PRESSURE: 68 MMHG | BODY MASS INDEX: 31.71 KG/M2 | HEART RATE: 95 BPM | TEMPERATURE: 97.4 F | OXYGEN SATURATION: 95 % | WEIGHT: 221.5 LBS | SYSTOLIC BLOOD PRESSURE: 126 MMHG

## 2024-09-11 DIAGNOSIS — F11.20 CONTINUOUS OPIOID DEPENDENCE (HCC): ICD-10-CM

## 2024-09-11 DIAGNOSIS — E11.40 TYPE 2 DIABETES MELLITUS WITH DIABETIC NEUROPATHY, WITHOUT LONG-TERM CURRENT USE OF INSULIN (HCC): Primary | ICD-10-CM

## 2024-09-11 DIAGNOSIS — S24.109S: ICD-10-CM

## 2024-09-11 DIAGNOSIS — E78.2 MIXED HYPERLIPIDEMIA: ICD-10-CM

## 2024-09-11 DIAGNOSIS — E11.42 DIABETIC POLYNEUROPATHY ASSOCIATED WITH TYPE 2 DIABETES MELLITUS (HCC): ICD-10-CM

## 2024-09-11 DIAGNOSIS — E11.8 DIABETIC FOOT (HCC): ICD-10-CM

## 2024-09-11 LAB — SL AMB POCT HEMOGLOBIN AIC: 8.5 (ref ?–6.5)

## 2024-09-11 PROCEDURE — 83036 HEMOGLOBIN GLYCOSYLATED A1C: CPT | Performed by: FAMILY MEDICINE

## 2024-09-11 PROCEDURE — 99213 OFFICE O/P EST LOW 20 MIN: CPT | Performed by: FAMILY MEDICINE

## 2024-09-11 RX ORDER — ATORVASTATIN CALCIUM 40 MG/1
40 TABLET, FILM COATED ORAL DAILY
Qty: 90 TABLET | Refills: 1 | Status: SHIPPED | OUTPATIENT
Start: 2024-09-11

## 2024-09-11 NOTE — LETTER
Diabetic Eye Exam Form    Date Requested: 24  Patient: Ankush [place]  Patient : 1970   Referring Provider: Shanice Mcadams MD      DIABETIC Eye Exam Date _______________________________      Type of Exam MUST be documented for Diabetic Eye Exams. Please CHECK ONE.     Retinal Exam       Dilated Retinal Exam       OCT       Optomap-Iris Exam      Fundus Photography       Left Eye - Please check Retinopathy or No Retinopathy        Exam did show retinopathy    Exam did not show retinopathy       Right Eye - Please check Retinopathy or No Retinopathy       Exam did show retinopathy    Exam did not show retinopathy       Comments __________________________________________________________    Practice Providing Exam ______________________________________________    Exam Performed By (print name) _______________________________________      Provider Signature ___________________________________________________      These reports are needed for  compliance.  Please fax this completed form and a copy of the Diabetic Eye Exam report to our office located at [address] [place] [address] as soon as possible via Fax [phone] jose david Jones: Phone [phone]  We thank you for your assistance in treating our mutual patient.

## 2024-09-11 NOTE — TELEPHONE ENCOUNTER
----- Message from Ginna JONES sent at 9/11/2024 12:43 PM EDT -----  Regarding: Care Gap Request  09/11/24 12:43 PM    Hello, our patient above has had Diabetic Eye Exam completed/performed. Please assist in updating the patient chart by making an External outreach to [facility] facility located in [place], [place]. The date of service is within the last year.    Thank you,  Ginna Lopez MA  [facility]

## 2024-09-11 NOTE — LETTER
Diabetic Eye Exam Form    Date Requested: 24  Patient: Ankush [place]  Patient : 1970   Referring Provider: Shanice Mcadams MD      DIABETIC Eye Exam Date _______________________________      Type of Exam MUST be documented for Diabetic Eye Exams. Please CHECK ONE.     Retinal Exam       Dilated Retinal Exam       OCT       Optomap-Iris Exam      Fundus Photography       Left Eye - Please check Retinopathy or No Retinopathy        Exam did show retinopathy    Exam did not show retinopathy       Right Eye - Please check Retinopathy or No Retinopathy       Exam did show retinopathy    Exam did not show retinopathy       Comments __________________________________________________________    Practice Providing Exam ______________________________________________    Exam Performed By (print name) _______________________________________      Provider Signature ___________________________________________________      These reports are needed for  compliance.  Please fax this completed form and a copy of the Diabetic Eye Exam report to our office located at [address] [place] [address] as soon as possible via Fax [phone] jose david Jones: Phone [phone]  We thank you for your assistance in treating our mutual patient.

## 2024-09-12 NOTE — TELEPHONE ENCOUNTER
Upon review of the In Basket request and the patient's chart, initial outreach has been made via fax to facility. Please see Contacts section for details.     Thank you  Robert Valencia MA

## 2024-09-16 NOTE — PROGRESS NOTES
Ambulatory Visit  Name: Ankush [place]      : 1970      MRN: [id number]  Encounter Provider: Shanice Mcadams MD  Encounter Date: 2024   Encounter department: [facility]    Assessment & Plan  Type 2 diabetes mellitus with diabetic neuropathy, without long-term current use of insulin (HCC)    Lab Results   Component Value Date    HGBA1C 8.5 (A) 2024     Chronic, not at goal  Add Ozempic and ramp up - he is to reach out to if this is not covered or if he has side effects (he previously didn't tolerate trulicity)    Orders:    POCT hemoglobin A1c    semaglutide, 0.25 or 0.5 mg/dose, (Ozempic, 0.25 or 0.5 MG/DOSE,) 2 mg/3 mL injection pen; Inject 0.25 mg under the skin once weekly for 28 days, THEN inject 0.5 mg under the skin once weekly    Diabetic foot (HCC)    Lab Results   Component Value Date    HGBA1C 8.5 (A) 2024       Orders:    Ambulatory referral to Podiatry; Future    Diabetic polyneuropathy associated with type 2 diabetes mellitus (HCC)    Lab Results   Component Value Date    HGBA1C 8.5 (A) 2024       Orders:    Ambulatory referral to Podiatry; Future    Mixed hyperlipidemia    Orders:    atorvastatin (LIPITOR) 40 mg tablet; Take 1 tablet (40 mg total) by mouth daily    Thoracic cord injury without spinal bone injury, sequela (HCC)         Continuous opioid dependence (HCC)            History of Present Illness     HPI Patient presents for follow up. He feels well - at his baseline. Does not routinely check his glucose.     History obtained from : patient  Review of Systems   Constitutional:  Negative for chills and fever.   HENT:  Negative for congestion and sore throat.    Eyes:  Negative for pain and visual disturbance.   Respiratory:  Negative for cough and shortness of breath.    Cardiovascular:  Negative for chest pain and palpitations.   Gastrointestinal:  Negative for abdominal pain and nausea.   Genitourinary:  Negative for dysuria.  "  Musculoskeletal:  Negative for arthralgias and myalgias.   Skin:  Negative for rash and wound.   Neurological:  Negative for dizziness and headaches.   All other systems reviewed and are negative.    Medical History Reviewed by provider this encounter:           Objective     /68   Pulse 95   Temp (!) 97.4 °F (36.3 °C)   Ht 5' 10\" (1.778 m)   Wt 100 kg (221 lb 8 oz)   SpO2 95%   BMI 31.78 kg/m²     Physical Exam  Vitals and nursing note reviewed.   Constitutional:       General: He is not in acute distress.     Appearance: He is well-developed. He is not diaphoretic.   HENT:      Head: Normocephalic and atraumatic.      Right Ear: External ear normal.      Left Ear: External ear normal.   Eyes:      Conjunctiva/sclera: Conjunctivae normal.   Cardiovascular:      Pulses: no weak pulses.           Dorsalis pedis pulses are 2+ on the right side and 2+ on the left side.        Posterior tibial pulses are 2+ on the right side and 2+ on the left side.   Pulmonary:      Effort: Pulmonary effort is normal. No respiratory distress.   Musculoskeletal:        Feet:    Feet:      Right foot:      Skin integrity: Callus and dry skin present. No ulcer, skin breakdown or erythema.      Left foot:      Skin integrity: Callus and dry skin present. No ulcer, skin breakdown, erythema or warmth.   Skin:     Findings: No rash.   Neurological:      Mental Status: He is alert.      Cranial Nerves: No cranial nerve deficit.     Diabetic Foot Exam    Patient's shoes and socks removed.    Right Foot/Ankle   Right Foot Inspection  Skin Exam: skin normal, skin intact, dry skin, callus and callus. No erythema, no maceration, no abnormal color, no pre-ulcer and no ulcer.     Toe Exam: ROM and strength within normal limits.     Sensory   Vibration: intact  Monofilament testing: diminished    Vascular  Capillary refills: < 3 seconds  The right DP pulse is 2+. The right PT pulse is 2+.     Left Foot/Ankle  Left Foot Inspection  Skin " Exam: skin normal, skin intact, dry skin and callus. No warmth, no erythema, no maceration, normal color, no pre-ulcer and no ulcer.     Toe Exam: ROM and strength within normal limits.     Sensory   Vibration: intact  Monofilament testing: diminished    Vascular  Capillary refills: < 3 seconds  The left DP pulse is 2+. The left PT pulse is 2+.     Assign Risk Category  No deformity present  No loss of protective sensation  No weak pulses  Risk: 0

## 2024-09-16 NOTE — TELEPHONE ENCOUNTER
As a follow-up, a second attempt has been made for outreach via fax to facility. Please see Contacts section for details.    Thank you  Robert Valencia MA

## 2024-09-16 NOTE — ASSESSMENT & PLAN NOTE
Lab Results   Component Value Date    HGBA1C 8.5 (A) 09/11/2024     Chronic, not at goal  Add Ozempic and ramp up - he is to reach out to if this is not covered or if he has side effects (he previously didn't tolerate trulicity)    Orders:    POCT hemoglobin A1c    semaglutide, 0.25 or 0.5 mg/dose, (Ozempic, 0.25 or 0.5 MG/DOSE,) 2 mg/3 mL injection pen; Inject 0.25 mg under the skin once weekly for 28 days, THEN inject 0.5 mg under the skin once weekly

## 2024-09-18 NOTE — TELEPHONE ENCOUNTER
Upon review of the In Basket request we were able to locate, review, and update the patient chart as requested for Diabetic Eye Exam.    Any additional questions or concerns should be emailed to the Practice Liaisons via the appropriate education email address, please do not reply via In Basket.    Thank you  Robert Valencia MA   [facility]

## 2024-09-23 DIAGNOSIS — F51.01 PRIMARY INSOMNIA: ICD-10-CM

## 2024-09-23 DIAGNOSIS — E11.65 TYPE 2 DIABETES MELLITUS WITH HYPERGLYCEMIA, WITHOUT LONG-TERM CURRENT USE OF INSULIN (HCC): ICD-10-CM

## 2024-09-23 RX ORDER — ZOLPIDEM TARTRATE 10 MG/1
10 TABLET ORAL
Qty: 30 TABLET | Refills: 0 | Status: SHIPPED | OUTPATIENT
Start: 2024-09-23

## 2024-09-23 NOTE — TELEPHONE ENCOUNTER
1 [id number] 09/09/2024 09/03/2024 HYDROcodone BITARTRATE 5 MG ORAL TABLET/ACETAMINOPHEN 325 MG (Tablet) 30.0 30 325 MG-5 MG 5.0 [facility] [place] / [place]    1 [id number] 08/23/2024 08/23/2024 Zolpidem Tartrate (Tablet) 30.0 30 10 MG NA [facility] [place] / [place]    1 [id number] 07/25/2024 07/25/2024 Zolpidem Tartrate (Tablet) 30.0 30 10 MG NA [facility] [place] / [place]    1 [id number] 06/26/2024 06/25/2024 Zolpidem Tartrate (Tablet) 30.0 30 10 MG NA [facility] [place]    1 [id number] 05/29/2024 05/29/2024 Zolpidem Tartrate (Tablet) 30.0 30 10 MG NA [facility] [place]    1 [id number] 04/29/2024 04/29/2024 Zolpidem Tartrate (Tablet) 30.0 30 10 MG [facility] [place] / [place]    1 [id number] 03/29/2024 03/29/2024 Zolpidem Tartrate (Tablet) 30.0 30 10 MG NA [facility] [place] / [place]    1 [id number] 02/29/2024 02/29/2024 Zolpidem Tartrate (Tablet) 30.0 30 10 MG [facility] [place] / [place]    1 [id number] 01/30/2024 01/30/2024 Zolpidem Tartrate (Tablet) 30.0 30 10 MG [facility] [place] / [place]    1 [id number] 12/26/2023 12/26/2023 Zolpidem Tartrate (Tablet) 30.0 30 10 MG [facility] Medicare 0 / 0

## 2024-09-23 NOTE — TELEPHONE ENCOUNTER
Reason for call:   [x] Refill   [] Prior Auth  [] Other:     Office:   [x] PCP/Provider -  Shanice Mcadams MD   [] Specialty/Provider -     Medication:  zolpidem (AMBIEN) 10 mg tablet    Dose/Frequency: Take 1 tablet (10 mg total) by mouth daily at bedtime as needed for sleep     Quantity: 30    Pharmacy: [facility]/pharmacy #[id number] - [place], [place] - [address]     Does the patient have enough for 3 days?   [] Yes   [x] No - Send as HP to POD

## 2024-09-23 NOTE — PROGRESS NOTES
9/24/2024      No chief complaint on file.        Assessment and Plan    54 y.o. male managed by NEW PATIENT    Elevated PSA  -PSA 5.014 (7/31/24)  -GEORGINA today prostate is smooth, nontender. No nodules or asymmetry appreciated. 30 g.  -Family hx of prostate ca in father.   -We discussed reasons for falsely elevated PSA. We will repeat a PSA now. If it is still elevated, we will obtain a multiparametric prostate MRI that will further assess for any lesions concerning for prostate ca that may be potentially used if needed for a transperineal fusion guided biopsy.  -All questions addressed; I will follow-up with patient's repeat PSA results.       History of Present Illness  Ankush Richmond is a 54 y.o. male here for evaluation of elevated PSA. He has a pmhx of DEONTE, chronic respiratory failure, Type II DM, thoracic cord injury, cervical radiculopathy, depression, chronic pain, spastic paresis.    He has no voiding complaints or urinary symptoms. He denies dysuria, frequency, urgency, and gross hematuria.     PSA Lab Trends:  5.014 (7/31/24)  2.50 (6/1/23)  2.2 (10/20/21)    He states he has a family hx of prostate ca in his father-diagnosed in his 70s.         Review of Systems   Constitutional:  Negative for activity change, chills, fatigue and fever.   HENT:  Negative for congestion, ear pain, rhinorrhea and sore throat.    Eyes:  Negative for photophobia, pain, redness and visual disturbance.   Respiratory:  Negative for cough, shortness of breath and wheezing.    Cardiovascular:  Negative for chest pain, palpitations and leg swelling.   Gastrointestinal:  Negative for abdominal pain, diarrhea, nausea and vomiting.   Genitourinary:  Negative for dysuria, flank pain, frequency, hematuria and urgency.   Musculoskeletal:  Negative for arthralgias and back pain.   Skin:  Negative for color change and rash.   Neurological:  Negative for seizures, syncope, weakness, light-headedness and headaches.   All other systems  reviewed and are negative.               Vitals  There were no vitals filed for this visit.    Physical Exam  Constitutional:       Appearance: Normal appearance. He is not toxic-appearing.   HENT:      Head: Normocephalic.      Mouth/Throat:      Pharynx: Oropharynx is clear.   Eyes:      Extraocular Movements: Extraocular movements intact.      Pupils: Pupils are equal, round, and reactive to light.   Pulmonary:      Effort: Pulmonary effort is normal. No respiratory distress.   Musculoskeletal:         General: Normal range of motion.      Cervical back: Normal range of motion.   Neurological:      Mental Status: He is alert and oriented to person, place, and time. Mental status is at baseline.      Gait: Gait normal.   Psychiatric:         Mood and Affect: Mood normal.         Behavior: Behavior normal.         Thought Content: Thought content normal.         Judgment: Judgment normal.           Past History  Past Medical History:   Diagnosis Date    Diabetes mellitus (HCC)      Social History     Socioeconomic History    Marital status:      Spouse name: Not on file    Number of children: Not on file    Years of education: Not on file    Highest education level: Not on file   Occupational History    Not on file   Tobacco Use    Smoking status: Never     Passive exposure: Never    Smokeless tobacco: Never   Vaping Use    Vaping status: Never Used   Substance and Sexual Activity    Alcohol use: No    Drug use: No    Sexual activity: Not on file   Other Topics Concern    Not on file   Social History Narrative    Not on file     Social Determinants of Health     Financial Resource Strain: Low Risk  (6/6/2023)    Overall Financial Resource Strain (CARDIA)     Difficulty of Paying Living Expenses: Not very hard   Food Insecurity: No Food Insecurity (6/11/2024)    Hunger Vital Sign     Worried About Running Out of Food in the Last Year: Never true     Ran Out of Food in the Last Year: Never true    Transportation Needs: No Transportation Needs (6/11/2024)    PRAPARE - Transportation     Lack of Transportation (Medical): No     Lack of Transportation (Non-Medical): No   Physical Activity: Not on file   Stress: Not on file   Social Connections: Not on file   Intimate Partner Violence: Not on file   Housing Stability: Low Risk  (6/11/2024)    Housing Stability Vital Sign     Unable to Pay for Housing in the Last Year: No     Number of Times Moved in the Last Year: 1     Homeless in the Last Year: No     Social History     Tobacco Use   Smoking Status Never    Passive exposure: Never   Smokeless Tobacco Never     Family History   Problem Relation Age of Onset    Diabetes Mother     Lung cancer Father     Skin cancer Father     Prostate cancer Father     Colon cancer Neg Hx     Hypertension Neg Hx     Hyperlipidemia Neg Hx     Thyroid disease Neg Hx        The following portions of the patient's history were reviewed and updated as appropriate: allergies, current medications, past medical history, past social history, past surgical history and problem list.    Results  No results found for this or any previous visit (from the past 1 hour(s)).]  Lab Results   Component Value Date    PSA 5.014 (H) 07/31/2024    PSA 2.50 06/01/2023    PSA 2.2 10/20/2021     Lab Results   Component Value Date    GLUCOSE 180 (H) 02/12/2016    CALCIUM 9.8 07/31/2024     11/03/2014    K 4.2 07/31/2024    CO2 31 07/31/2024    CL 99 07/31/2024    BUN 11 07/31/2024    CREATININE 0.70 07/31/2024     Lab Results   Component Value Date    WBC 5.99 07/31/2024    HGB 15.5 07/31/2024    HCT 50.9 (H) 07/31/2024    MCV 92 07/31/2024     07/31/2024       GUERRERO Townsend

## 2024-09-24 ENCOUNTER — CONSULT (OUTPATIENT)
Dept: UROLOGY | Facility: CLINIC | Age: 54
End: 2024-09-24
Payer: COMMERCIAL

## 2024-09-24 VITALS
HEART RATE: 84 BPM | BODY MASS INDEX: 31.64 KG/M2 | WEIGHT: 221 LBS | HEIGHT: 70 IN | DIASTOLIC BLOOD PRESSURE: 78 MMHG | OXYGEN SATURATION: 96 % | SYSTOLIC BLOOD PRESSURE: 132 MMHG

## 2024-09-24 DIAGNOSIS — R97.20 ELEVATED PSA: ICD-10-CM

## 2024-09-24 PROCEDURE — 99203 OFFICE O/P NEW LOW 30 MIN: CPT

## 2024-09-24 RX ORDER — ZOLPIDEM TARTRATE 10 MG/1
10 TABLET ORAL
Qty: 30 TABLET | Refills: 0 | Status: SHIPPED | OUTPATIENT
Start: 2024-09-24

## 2024-09-24 RX ORDER — BLOOD-GLUCOSE METER
EACH MISCELLANEOUS
Qty: 1 KIT | Refills: 0 | Status: SHIPPED | OUTPATIENT
Start: 2024-09-24

## 2024-09-27 DIAGNOSIS — B02.9 HERPES ZOSTER WITHOUT COMPLICATION: ICD-10-CM

## 2024-09-27 RX ORDER — VALACYCLOVIR HYDROCHLORIDE 1 G/1
1000 TABLET, FILM COATED ORAL 2 TIMES DAILY
Qty: 14 TABLET | Refills: 0 | Status: SHIPPED | OUTPATIENT
Start: 2024-09-27 | End: 2024-10-04

## 2024-09-27 NOTE — TELEPHONE ENCOUNTER
Mata for patient to call back, he was given Valacyclovir for outbreak. Does he still have active shingles rash?

## 2024-09-27 NOTE — TELEPHONE ENCOUNTER
Spoke with PT. He said the outbreak he had before has cleared up, but now there is a new outbreak developing, the size of a quarter, on his right upper butt cheek, and he would like to get this cleared up before it gets out of hand.    Please advise

## 2024-09-27 NOTE — TELEPHONE ENCOUNTER
Last filled at [facility] [place]    Reason for call:   [x] Refill   [] Prior Auth  [] Other:     Office:   [x] PCP/Provider - Shanice Torres   [] Specialty/Provider -     Medication: Valacyclovir    Dose/Frequency: 1000 mg BID x 7 Days    Quantity: 14    [facility]    Does the patient have enough for 3 days?   [] Yes   [x] No - Send as HP to POD

## 2024-10-23 DIAGNOSIS — F51.01 PRIMARY INSOMNIA: ICD-10-CM

## 2024-10-23 RX ORDER — ZOLPIDEM TARTRATE 10 MG/1
10 TABLET ORAL
Qty: 30 TABLET | Refills: 0 | Status: SHIPPED | OUTPATIENT
Start: 2024-10-23

## 2024-10-23 NOTE — TELEPHONE ENCOUNTER
Reason for call:   [x] Refill   [] Prior Auth  [] Other:     Office:   [x] PCP/Provider -   [] Specialty/Provider -     Medication: zolpidem (AMBIEN) 10 mg tablet  TAKE 1 TABLET BY MOUTH DAILY AT BEDTIME AS NEEDED FOR SLEEP,       Pharmacy: [facility]/pharmacy #[id number] - [place] - [address]      Does the patient have enough for 3 days?   [] Yes   [x] No - Send as HP to POD

## 2024-10-23 NOTE — TELEPHONE ENCOUNTER
1 [id number] 09/23/2024 09/23/2024 Zolpidem Tartrate (Tablet) 30.0 30 10 MG NA [facility] [place] / 0 [place]    1 [id number] 09/09/2024 09/03/2024 HYDROcodone BITARTRATE 5 MG ORAL TABLET/ACETAMINOPHEN 325 MG (Tablet) 30.0 30 325 MG-5 MG 5.0 [facility] [place] 0 / 0 [place]    1 [id number] 08/23/2024 08/23/2024 Zolpidem Tartrate (Tablet) 30.0 30 10 MG NA [facility] [place] / 0 [place]    1 [id number] 07/25/2024 07/25/2024 Zolpidem Tartrate (Tablet) 30.0 30 10 MG NA [facility] [place] / 0 [place]    1 [id number] 06/26/2024 06/25/2024 Zolpidem Tartrate (Tablet) 30.0 30 10 MG NA [facility] [place]    1 [id number] 05/29/2024 05/29/2024 Zolpidem Tartrate (Tablet) 30.0 30 10 MG NA [facility] [place] / 0 [place]    1 [id number] 04/29/2024 04/29/2024 Zolpidem Tartrate (Tablet) 30.0 30 10 MG NA [facility] [place]    1 [id number] 03/29/2024 03/29/2024 Zolpidem Tartrate (Tablet) 30.0 30 10 MG NA [facility] [place] 0 [place]    1 [id number] 02/29/2024 02/29/2024 Zolpidem Tartrate (Tablet) 30.0 30 10 MG [facility] [place]    1 [id number] 01/30/2024 01/30/2024 Zolpidem Tartrate (Tablet) 30.0 30 10 MG [facility] [place] / 0 [place]

## 2024-11-07 ENCOUNTER — APPOINTMENT (OUTPATIENT)
Dept: LAB | Facility: CLINIC | Age: 54
End: 2024-11-07
Payer: COMMERCIAL

## 2024-11-07 DIAGNOSIS — R97.20 ELEVATED PSA: ICD-10-CM

## 2024-11-07 LAB — PSA SERPL-MCNC: 4.65 NG/ML (ref 0–4)

## 2024-11-07 PROCEDURE — 84153 ASSAY OF PSA TOTAL: CPT

## 2024-11-07 PROCEDURE — 36415 COLL VENOUS BLD VENIPUNCTURE: CPT

## 2024-11-08 ENCOUNTER — TELEPHONE (OUTPATIENT)
Dept: UROLOGY | Facility: CLINIC | Age: 54
End: 2024-11-08

## 2024-11-08 DIAGNOSIS — E11.65 CONTROLLED TYPE 2 DIABETES MELLITUS WITH HYPERGLYCEMIA, WITHOUT LONG-TERM CURRENT USE OF INSULIN (HCC): ICD-10-CM

## 2024-11-08 DIAGNOSIS — R97.20 ELEVATED PSA: Primary | ICD-10-CM

## 2024-11-08 NOTE — TELEPHONE ENCOUNTER
----- Message from GUERRERO Townsend sent at 11/8/2024  7:44 AM EST -----  Patient's PSA has decreased slightly to 4.65, this is still elevated. I have ordered a prostate MRI for patient to get. At our last visit, we discussed if his repeat PSA is still elevated we will get a prostate MRI.

## 2024-11-08 NOTE — TELEPHONE ENCOUNTER
Attempted to speak directly with patient but received voicemail instead. A message was left informing patient that the provider had ordered an MRI or prostate, as discussed at patient's last visit.  Office provided the scheduling number, [phone], so patient could call when they had their calendar in front of them. Office number, [phone] was also provided from patient to confirm receipt of message and to ask any questions they may have.

## 2024-11-20 DIAGNOSIS — F33.0 MILD EPISODE OF RECURRENT MAJOR DEPRESSIVE DISORDER (HCC): ICD-10-CM

## 2024-11-21 DIAGNOSIS — F51.01 PRIMARY INSOMNIA: ICD-10-CM

## 2024-11-21 NOTE — TELEPHONE ENCOUNTER
Reason for call:   [x] Refill   [] Prior Auth  [] Other:     Office:   [x] PCP/Provider - Ruby Schaefer  [] Specialty/Provider -     Medication: Zolpidem     Dose/Frequency: 10 mg Q HS    Quantity: 30    [facility]    Does the patient have enough for 3 days?   [] Yes   [x] No - Send as HP to POD

## 2024-11-21 NOTE — TELEPHONE ENCOUNTER
1 [id number] 10/23/2024 10/23/2024 Zolpidem Tartrate (Tablet) 30.0 30 10 MG NA RISHABH JOAQUIN [facility] [place]    1 [id number] 09/23/2024 09/23/2024 Zolpidem Tartrate (Tablet) 30.0 30 10 MG NA [facility] [place] 0 [place]    1 [id number] 09/09/2024 09/03/2024 HYDROcodone BITARTRATE 5 MG ORAL TABLET/ACETAMINOPHEN 325 MG (Tablet) 30.0 30 325 MG-5 MG 5.0 [facility] [place] / 0 [place]    1 [id number] 08/23/2024 08/23/2024 Zolpidem Tartrate (Tablet) 30.0 30 10 MG NA [facility] [place]    1 [id number] 07/25/2024 07/25/2024 Zolpidem Tartrate (Tablet) 30.0 30 10 MG NA [facility] [place]    1 [id number] 06/26/2024 06/25/2024 Zolpidem Tartrate (Tablet) 30.0 30 10 MG NA [facility] [place]    1 [id number] 05/29/2024 05/29/2024 Zolpidem Tartrate (Tablet) 30.0 30 10 MG NA [facility] [place]    1 [id number] 04/29/2024 04/29/2024 Zolpidem Tartrate (Tablet) 30.0 30 10 MG [facility] [place]    1 [id number] 03/29/2024 03/29/2024 Zolpidem Tartrate (Tablet) 30.0 30 10 MG NA [facility] [place]    1 [id number] 02/29/2024 02/29/2024 Zolpidem Tartrate (Tablet) 30.0 30 10 MG [facility] [place]

## 2024-11-22 RX ORDER — ZOLPIDEM TARTRATE 10 MG/1
10 TABLET ORAL
Qty: 30 TABLET | Refills: 0 | Status: SHIPPED | OUTPATIENT
Start: 2024-11-22

## 2024-12-03 ENCOUNTER — RA CDI HCC (OUTPATIENT)
Dept: OTHER | Facility: HOSPITAL | Age: 54
End: 2024-12-03

## 2024-12-19 ENCOUNTER — VBI (OUTPATIENT)
Dept: ADMINISTRATIVE | Facility: OTHER | Age: 54
End: 2024-12-19

## 2024-12-19 NOTE — TELEPHONE ENCOUNTER
12/19/24 8:19 AM     Chart reviewed for Diabetic Eye Exam was/were submitted to the patient's insurance.     Juan Lawton MA   [facility]

## 2024-12-20 DIAGNOSIS — F51.01 PRIMARY INSOMNIA: ICD-10-CM

## 2024-12-20 NOTE — TELEPHONE ENCOUNTER
Reason for call:   [x] Refill   [] Prior Auth  [] Other:     Office:   [x] PCP/Provider - FM Lucille / Abbe    Medication: zolpidem    Dose/Frequency: 10mg qhs prn    Quantity: 30    Pharmacy: [facility]/pharmacy #[id number] - [place] - [address]     Does the patient have enough for 3 days?   [x] Yes   [] No - Send as HP to POD

## 2024-12-20 NOTE — TELEPHONE ENCOUNTER
1 [id number] 11/22/2024 11/22/2024 Zolpidem Tartrate (Tablet) 30.0 30 10 MG NA [facility] [place]    1 [id number] 10/23/2024 10/23/2024 Zolpidem Tartrate (Tablet) 30.0 30 10 MG NA [facility] [place] / 0 [place]    1 [id number] 09/23/2024 09/23/2024 Zolpidem Tartrate (Tablet) 30.0 30 10 MG NA [facility] [place]    1 [id number] 09/09/2024 09/03/2024 HYDROcodone BITARTRATE 5 MG ORAL TABLET/ACETAMINOPHEN 325 MG (Tablet) 30.0 30 325 MG-5 MG 5.0 [facility] [place] / 0 [place]    1 [id number] 08/23/2024 08/23/2024 Zolpidem Tartrate (Tablet) 30.0 30 10 MG NA [facility] [place] / 0 [place]    1 [id number] 07/25/2024 07/25/2024 Zolpidem Tartrate (Tablet) 30.0 30 10 MG NA [facility] [place] / [place]    1 [id number] 06/26/2024 06/25/2024 Zolpidem Tartrate (Tablet) 30.0 30 10 MG [facility] [place] / [place]    1 [id number] 05/29/2024 05/29/2024 Zolpidem Tartrate (Tablet) 30.0 30 10 MG NA [facility] [place] / [place]    1 [id number] 04/29/2024 04/29/2024 Zolpidem Tartrate (Tablet) 30.0 30 10 MG [facility] [place] / [place]    1 [id number] 03/29/2024 03/29/2024 Zolpidem Tartrate (Tablet) 30.0 30 10 MG [facility] [place] / [place]

## 2024-12-23 ENCOUNTER — VBI (OUTPATIENT)
Dept: ADMINISTRATIVE | Facility: OTHER | Age: 54
End: 2024-12-23

## 2024-12-23 RX ORDER — ZOLPIDEM TARTRATE 10 MG/1
10 TABLET ORAL
Qty: 30 TABLET | Refills: 0 | Status: SHIPPED | OUTPATIENT
Start: 2024-12-23

## 2024-12-23 NOTE — TELEPHONE ENCOUNTER
12/23/24 2:05 PM     Chart reviewed for Hemoglobin A1c was/were submitted to the patient's insurance.     Alivia Marcelo   [facility]

## 2025-01-22 DIAGNOSIS — F51.01 PRIMARY INSOMNIA: ICD-10-CM

## 2025-01-22 NOTE — TELEPHONE ENCOUNTER
1 [id number] 12/23/2024 12/23/2024 Zolpidem Tartrate (Tablet) 30.0 30 10 MG NA ROBERT JAVIER POGODZINSKI [facility] [place] / 0 [place]    1 [id number] 11/22/2024 11/22/2024 Zolpidem Tartrate (Tablet) 30.0 30 10 MG NA [facility] [place] / [place]    1 [id number] 10/23/2024 10/23/2024 Zolpidem Tartrate (Tablet) 30.0 30 10 MG NA RISHABH [facility] [place] 0 / 0 [place]    1 [id number] 09/23/2024 09/23/2024 Zolpidem Tartrate (Tablet) 30.0 30 10 MG NA [facility] [place] / 0 [place]    1 [id number] 09/09/2024 09/03/2024 HYDROcodone BITARTRATE 5 MG ORAL TABLET/ACETAMINOPHEN 325 MG (Tablet) 30.0 30 325 MG-5 MG 5.0 [facility] [place] 0 / 0 [place]    1 [id number] 08/23/2024 08/23/2024 Zolpidem Tartrate (Tablet) 30.0 30 10 MG NA [facility] [place] / 0 [place]    1 [id number] 07/25/2024 07/25/2024 Zolpidem Tartrate (Tablet) 30.0 30 10 MG NA [facility] [place] / 0 [place]    1 [id number] 06/26/2024 06/25/2024 Zolpidem Tartrate (Tablet) 30.0 30 10 MG NA [facility] [place] / 0 [place]    1 [id number] 05/29/2024 05/29/2024 Zolpidem Tartrate (Tablet) 30.0 30 10 MG NA [facility] [place] / 0 [place]    1 [id number] 04/29/2024 04/29/2024 Zolpidem Tartrate (Tablet) 30.0 30 10 MG [facility] [place] / 0 [place]

## 2025-01-22 NOTE — TELEPHONE ENCOUNTER
Reason for call:   [x] Refill   [] Prior Auth  [] Other:     Office:   [x] PCP/Provider -   [] Specialty/Provider -     Medication: zolpidem (AMBIEN) 10 mg tablet     Dose/Frequency: Take 1 tablet (10 mg total) by mouth daily     Quantity: 30 tablet    Pharmacy: [facility]/pharmacy #[id number] - [place] -     Does the patient have enough for 3 days?   [x] Yes   [] No - Send as HP to POD

## 2025-01-23 RX ORDER — ZOLPIDEM TARTRATE 10 MG/1
10 TABLET ORAL
Qty: 30 TABLET | Refills: 0 | Status: SHIPPED | OUTPATIENT
Start: 2025-01-23

## 2025-02-10 DIAGNOSIS — B02.9 HERPES ZOSTER WITHOUT COMPLICATION: ICD-10-CM

## 2025-02-10 NOTE — TELEPHONE ENCOUNTER
Reason for call:   [x] Refill   [] Prior Auth  [] Other:     Office:   [x] PCP/Provider - Shanice Mcadams MD   [] Specialty/Provider -     Medication: valACYclovir (VALTREX) 1,000 mg tablet    Dose/Frequency: 1000 mg    Quantity: 14 tablet    Pharmacy: [facility]/pharmacy #[id number] - [place] - [address]     Does the patient have enough for 3 days?   [] Yes   [x] No - Send as HP to POD

## 2025-02-11 RX ORDER — VALACYCLOVIR HYDROCHLORIDE 1 G/1
1000 TABLET, FILM COATED ORAL 2 TIMES DAILY
Qty: 14 TABLET | Refills: 0 | OUTPATIENT
Start: 2025-02-11 | End: 2025-02-18

## 2025-02-11 NOTE — TELEPHONE ENCOUNTER
Patient called back regarding this medication and stated this was for a new outbreak.    Please advise  Thank you

## 2025-02-12 RX ORDER — VALACYCLOVIR HYDROCHLORIDE 1 G/1
1000 TABLET, FILM COATED ORAL 2 TIMES DAILY
Qty: 14 TABLET | Refills: 0 | Status: SHIPPED | OUTPATIENT
Start: 2025-02-12 | End: 2025-02-19

## 2025-02-20 DIAGNOSIS — F51.01 PRIMARY INSOMNIA: ICD-10-CM

## 2025-02-20 RX ORDER — ZOLPIDEM TARTRATE 10 MG/1
10 TABLET ORAL
Qty: 30 TABLET | Refills: 0 | Status: SHIPPED | OUTPATIENT
Start: 2025-02-20

## 2025-02-20 NOTE — TELEPHONE ENCOUNTER
Reason for call:   [x] Refill   [] Prior Auth  [] Other:     Office:   [x] PCP/Provider - Abbe  [] Specialty/Provider -     Medication:   Zolpidem 10 mg, 1 hs, 30    [facility]    Does the patient have enough for 3 days?   [] Yes   [x] No - Send as HP to POD

## 2025-02-20 NOTE — TELEPHONE ENCOUNTER
1 [id number] 01/23/2025 01/23/2025 Zolpidem Tartrate (Tablet) 30.0 30 10 MG NA [facility] [place] / [place]    1 [id number] 12/23/2024 12/23/2024 Zolpidem Tartrate (Tablet) 30.0 30 10 MG NA ROBERT JAVIER POGODZINSKI [facility] [place] 0 / 0 [place]    1 [id number] 11/22/2024 11/22/2024 Zolpidem Tartrate (Tablet) 30.0 30 10 MG NA [facility] [place] 0 / 0 [place]    1 [id number] 10/23/2024 10/23/2024 Zolpidem Tartrate (Tablet) 30.0 30 10 MG NA RISHABH [facility] [place] 0 / 0 [place]    1 [id number] 09/23/2024 09/23/2024 Zolpidem Tartrate (Tablet) 30.0 30 10 MG NA [facility] [place] 0 / 0 [place]    1 [id number] 09/09/2024 09/03/2024 HYDROcodone BITARTRATE 5 MG ORAL TABLET/ACETAMINOPHEN 325 MG (Tablet) 30.0 30 325 MG-5 MG 5.0 [facility] [place] 0 / 0 [place]    1 [id number] 08/23/2024 08/23/2024 Zolpidem Tartrate (Tablet) 30.0 30 10 MG NA [facility] [place] 0 / 0 [place]    1 [id number] 07/25/2024 07/25/2024 Zolpidem Tartrate (Tablet) 30.0 30 10 MG NA [facility]. [place] 0 / 0 [place]    1 [id number] 06/26/2024 06/25/2024 Zolpidem Tartrate (Tablet) 30.0 30 10 MG NA [facility]. [place] / 0 [place]    1 [id number] 05/29/2024 05/29/2024 Zolpidem Tartrate (Tablet) 30.0 30 10 MG NA ELSPETH BLACK-MIRELES [facility] [place] / 0 [place]

## 2025-03-24 DIAGNOSIS — F51.01 PRIMARY INSOMNIA: ICD-10-CM

## 2025-03-24 NOTE — TELEPHONE ENCOUNTER
Reason for call:   [x] Refill   [] Prior Auth  [] Other:     Office:   [x] PCP/Provider - Shanice Mcadams  [] Specialty/Provider -     Medication: Zolpidem    Dose/Frequency: 1 mg HS PRN    Quantity: 30    [facility] [place]    Local Pharmacy   Does the patient have enough for 3 days?   [] Yes   [x] No - Send as HP to POD    Mail Away Pharmacy   Does the patient have enough for 10 days?   [] Yes   [] No - Send as HP to POD

## 2025-03-24 NOTE — TELEPHONE ENCOUNTER
1 [id number] 02/20/2025 02/20/2025 Zolpidem Tartrate (Tablet) 30.0 30 10 MG NA [facility] [place] / [place]    1 [id number] 01/23/2025 01/23/2025 Zolpidem Tartrate (Tablet) 30.0 30 10 MG NA [facility] [place] / [place]    1 [id number] 12/23/2024 12/23/2024 Zolpidem Tartrate (Tablet) 30.0 30 10 MG NA ROBERT JAVIER POGODZINSKI [facility] [place] 0 / 0 [place]    1 [id number] 11/22/2024 11/22/2024 Zolpidem Tartrate (Tablet) 30.0 30 10 MG NA [facility] [place] 0 / 0 [place]    1 [id number] 10/23/2024 10/23/2024 Zolpidem Tartrate (Tablet) 30.0 30 10 MG NA RISHABH [facility] [place] 0 / 0 [place]    1 [id number] 09/23/2024 09/23/2024 Zolpidem Tartrate (Tablet) 30.0 30 10 MG NA [facility] [place] / 0 [place]    1 [id number] 09/09/2024 09/03/2024 HYDROcodone BITARTRATE 5 MG ORAL TABLET/ACETAMINOPHEN 325 MG (Tablet) 30.0 30 325 MG-5 MG 5.0 [facility] [place] 0 / 0 [place]    1 [id number] 08/23/2024 08/23/2024 Zolpidem Tartrate (Tablet) 30.0 30 10 MG NA [facility]. [place] / 0 [place]    1 [id number] 07/25/2024 07/25/2024 Zolpidem Tartrate (Tablet) 30.0 30 10 MG NA [facility]. [place] / 0 [place]    1 [id number] 06/26/2024 06/25/2024 Zolpidem Tartrate (Tablet) 30.0 30 10 MG NA SHENA [facility]

## 2025-03-25 RX ORDER — ZOLPIDEM TARTRATE 10 MG/1
10 TABLET ORAL
Qty: 30 TABLET | Refills: 0 | Status: SHIPPED | OUTPATIENT
Start: 2025-03-25

## 2025-03-25 NOTE — TELEPHONE ENCOUNTER
Pt last seen in Sept. This will be his last refill for this med, needs appt within next 30 days with me.

## 2025-04-22 DIAGNOSIS — E11.65 TYPE 2 DIABETES MELLITUS WITH HYPERGLYCEMIA, WITHOUT LONG-TERM CURRENT USE OF INSULIN (HCC): Primary | ICD-10-CM

## 2025-04-22 DIAGNOSIS — F51.01 PRIMARY INSOMNIA: ICD-10-CM

## 2025-04-22 RX ORDER — ZOLPIDEM TARTRATE 10 MG/1
10 TABLET ORAL
Qty: 30 TABLET | Refills: 0 | OUTPATIENT
Start: 2025-04-22

## 2025-04-22 NOTE — TELEPHONE ENCOUNTER
1 [id number] 03/25/2025 03/25/2025 Zolpidem Tartrate (Tablet) 30.0 30 10 MG NA [facility] [place] 0 / 0 [place]    1 [id number] 02/20/2025 02/20/2025 Zolpidem Tartrate (Tablet) 30.0 30 10 MG NA [facility]. [place] 0 / 0 [place]    1 [id number] 01/23/2025 01/23/2025 Zolpidem Tartrate (Tablet) 30.0 30 10 MG NA [facility]. [place] 0 / 0 [place]    1 [id number] 12/23/2024 12/23/2024 Zolpidem Tartrate (Tablet) 30.0 30 10 MG NA ROBERT JAVIER POGODZINSKI [facility] [place] 0 / 0 [place]    1 [id number] 11/22/2024 11/22/2024 Zolpidem Tartrate (Tablet) 30.0 30 10 MG NA [facility] [place] 0 / 0 [place]    1 [id number] 10/23/2024 10/23/2024 Zolpidem Tartrate (Tablet) 30.0 30 10 MG NA RISHABH [facility] Medicare 0 / 0 [place]    1 [id number] 09/23/2024 09/23/2024 Zolpidem Tartrate (Tablet) 30.0 30 10 MG NA [facility] [place] 0 / 0 [place]    1 [id number] 09/09/2024 09/03/2024 HYDROcodone BITARTRATE 5 MG ORAL TABLET/ACETAMINOPHEN 325 MG (Tablet) 30.0 30 325 MG-5 MG 5.0 [facility]. Medicare 0 / 0 [place]    1 [id number] 08/23/2024 08/23/2024 Zolpidem Tartrate (Tablet) 30.0 30 10 MG NA [facility]. [place] 0 / 0 [place]    1 [id number] 07/25/2024 07/25/2024 Zolpidem Tartrate (Tablet) 30.0 30 10 MG NA ELSPETH BLACK-MIRELES [facility] Medicare 0 / 0 [place]

## 2025-04-22 NOTE — TELEPHONE ENCOUNTER
Reason for call:   [x] Refill   [] Prior Auth  [] Other:     Office:   [x] PCP/Provider - Shanice Mcadams MD   [] Specialty/Provider -     Medication: zolpidem (AMBIEN) 10 mg tablet     Dose/Frequency: 10 mg, Oral, Daily at bedtime PRN     Quantity: 30    Pharmacy: [facility]/pharmacy #[id number] - [place], [place] - [address] [facility]     Local Pharmacy   Does the patient have enough for 3 days?   [x] Yes   [] No - Send as HP to POD    Mail Away Pharmacy   Does the patient have enough for 10 days?   [] Yes   [] No - Send as HP to POD

## 2025-04-23 NOTE — TELEPHONE ENCOUNTER
Pt did not follow up in December  and has not scheduled follow ups. Needs labs and needs appt for refill.

## 2025-04-25 ENCOUNTER — OFFICE VISIT (OUTPATIENT)
Dept: URGENT CARE | Facility: MEDICAL CENTER | Age: 55
End: 2025-04-25
Payer: COMMERCIAL

## 2025-04-25 VITALS
DIASTOLIC BLOOD PRESSURE: 74 MMHG | BODY MASS INDEX: 31.99 KG/M2 | HEIGHT: 69 IN | HEART RATE: 93 BPM | TEMPERATURE: 98.1 F | RESPIRATION RATE: 18 BRPM | WEIGHT: 216 LBS | OXYGEN SATURATION: 94 % | SYSTOLIC BLOOD PRESSURE: 144 MMHG

## 2025-04-25 DIAGNOSIS — H60.501 ACUTE OTITIS EXTERNA OF RIGHT EAR, UNSPECIFIED TYPE: Primary | ICD-10-CM

## 2025-04-25 PROCEDURE — S9083 URGENT CARE CENTER GLOBAL: HCPCS | Performed by: PHYSICIAN ASSISTANT

## 2025-04-25 PROCEDURE — 99213 OFFICE O/P EST LOW 20 MIN: CPT | Performed by: PHYSICIAN ASSISTANT

## 2025-04-25 RX ORDER — OFLOXACIN 3 MG/ML
10 SOLUTION AURICULAR (OTIC) DAILY
Qty: 3.5 ML | Refills: 0 | Status: SHIPPED | OUTPATIENT
Start: 2025-04-25 | End: 2025-05-02

## 2025-04-25 NOTE — PATIENT INSTRUCTIONS
"Patient Education     Outer ear infection   The Basics   Written by the doctors and editors at [facility]   What is an outer ear infection? -- An outer ear infection is a condition that can cause pain in the ear canal. The ear canal is the part of the ear that goes from the outer ear to the eardrum (figure 1).  An outer ear infection is sometimes called \"swimmer's ear.\" But an outer ear infection does not just happen in people who swim. People who do not swim can also get it.  What causes an outer ear infection? -- An outer ear infection happens when the skin in the ear canal gets irritated or scratched, and then gets infected. This can happen when a person:   Puts cotton swabs, fingers, or other things inside the ear   Cleans the ear canal to remove ear wax   Swims on a regular basis - Water can soften the skin of the ear canal, which allows germs to infect the skin more easily.   Wears hearing aids, headphones, or ear plugs that can irritate or damage the skin inside the ear canal  What are the symptoms of an outer ear infection? -- The most common symptoms are:   Pain inside the ear, especially when the ear is pulled or moved   Itching inside the ear   Fluid or pus leaking from the ear   Trouble hearing  Is there a test for an outer ear infection? -- No. There is no test. Your doctor or nurse will be able to tell if you have it by asking about your symptoms and looking in your ear. During the visit, your doctor might also clean out your ear so that it can heal more quickly.  How is an outer ear infection treated? -- Treatments can include:   Ear drops - Finish all of the medicine, even if you feel better after a few days. When you use ear drops, you should:   Lie on your side or tilt your head, with the affected ear facing up.   Make sure that the ear drops go into the ear canal. It can help to pull your ear up and toward the back of your head to straighten the ear canal. If you are giving ear drops to a child, " pull their ear down and toward the back of their head.   Stay in this position for 3 to 5 minutes after the ear drops are put in.   Medicines to relieve pain  What else should I do during treatment? -- Keep the inside of the ear dry while the infection heals. Do not swim for 7 to 10 days after starting treatment. You can take a shower, but make sure to keep the ear dry. You can put some petroleum jelly (sample brand name: Vaseline) on a cotton ball, and then put the cotton ball in your outer ear, covering the opening of your ear canal. Do not push the cotton ball into the ear canal.  You should also avoid wearing hearing aids or ear buds, or putting anything into the infected ear, until your symptoms improve.  Can an outer ear infection be prevented? -- Sometimes. To reduce your chances of getting an outer ear infection:   Do not put anything into your ears, even to clean them - The inside of the ears do not usually need to be cleaned. It is normal to have some ear wax in your ears. Ear wax protects the ear canal. But if you are worried that you have too much ear wax, talk to your doctor or nurse. They can look in your ears and tell you how to clean them safely.   Follow these tips if you swim a lot:   Shake your ears dry after you swim, or use a blow dryer to help dry them. If you use a blow dryer, put it on the lowest setting and be careful to avoid burns.   Use over-the-counter ear-drying drops after you swim. These usually contain alcohol or vinegar, and might help prevent infection.   Wear ear plugs to prevent water from getting into your ears. Keep the ear plugs clean, and get new ones if your ear plugs are too dirty or start to fall apart.  Should I see a doctor or nurse? -- Call your doctor or nurse if:   Your symptoms get worse at any point.   Your symptoms have not gone away 6 days after starting treatment.  All topics are updated as new evidence becomes available and our peer review process is  complete.  This topic retrieved from [employer] on: Feb 28, 2024.  Topic 21842 Version 19.0  Release: 32.2.4 - C32.58  © 2024 UpToDate, Inc. and/or its affiliates. All rights reserved.  figure 1: Normal ear     This figure shows the normal parts of the outer, middle, and inner ear.  Graphic 92917 Version 5.0  Consumer Information Use and Disclaimer   Disclaimer: This generalized information is a limited summary of diagnosis, treatment, and/or medication information. It is not meant to be comprehensive and should be used as a tool to help the user understand and/or assess potential diagnostic and treatment options. It does NOT include all information about conditions, treatments, medications, side effects, or risks that may apply to a specific patient. It is not intended to be medical advice or a substitute for the medical advice, diagnosis, or treatment of a health care provider based on the health care provider's examination and assessment of a patient's specific and unique circumstances. Patients must speak with a health care provider for complete information about their health, medical questions, and treatment options, including any risks or benefits regarding use of medications. This information does not endorse any treatments or medications as safe, effective, or approved for treating a specific patient. UpToDate, Inc. and its affiliates disclaim any warranty or liability relating to this information or the use thereof.The use of this information is governed by the Terms of Use, available at https://www.woltersSpeechCycleuwer.com/en/know/clinical-effectiveness-terms. 2024© UpToDate, Inc. and its affiliates and/or licensors. All rights reserved.  Copyright   © 2024 UpToDate, Inc. and/or its affiliates. All rights reserved.

## 2025-04-25 NOTE — PROGRESS NOTES
"  [facility]        NAME: Ankush Richmond is a 54 y.o. male  : 1970    MRN: [id number]  DATE: 2025  TIME: 4:49 PM    Assessment and Plan   Acute otitis externa of right ear, unspecified type [H60.501]  1. Acute otitis externa of right ear, unspecified type  ofloxacin (FLOXIN) 0.3 % otic solution            Patient Instructions     Patient Instructions   Patient Education     Outer ear infection   The Basics   Written by the doctors and editors at [facility]   What is an outer ear infection? -- An outer ear infection is a condition that can cause pain in the ear canal. The ear canal is the part of the ear that goes from the outer ear to the eardrum (figure 1).  An outer ear infection is sometimes called \"swimmer's ear.\" But an outer ear infection does not just happen in people who swim. People who do not swim can also get it.  What causes an outer ear infection? -- An outer ear infection happens when the skin in the ear canal gets irritated or scratched, and then gets infected. This can happen when a person:   Puts cotton swabs, fingers, or other things inside the ear   Cleans the ear canal to remove ear wax   Swims on a regular basis - Water can soften the skin of the ear canal, which allows germs to infect the skin more easily.   Wears hearing aids, headphones, or ear plugs that can irritate or damage the skin inside the ear canal  What are the symptoms of an outer ear infection? -- The most common symptoms are:   Pain inside the ear, especially when the ear is pulled or moved   Itching inside the ear   Fluid or pus leaking from the ear   Trouble hearing  Is there a test for an outer ear infection? -- No. There is no test. Your doctor or nurse will be able to tell if you have it by asking about your symptoms and looking in your ear. During the visit, your doctor might also clean out your ear so that it can heal more quickly.  How is an outer ear infection treated? -- Treatments can include:   " Ear drops - Finish all of the medicine, even if you feel better after a few days. When you use ear drops, you should:   Lie on your side or tilt your head, with the affected ear facing up.   Make sure that the ear drops go into the ear canal. It can help to pull your ear up and toward the back of your head to straighten the ear canal. If you are giving ear drops to a child, pull their ear down and toward the back of their head.   Stay in this position for 3 to 5 minutes after the ear drops are put in.   Medicines to relieve pain  What else should I do during treatment? -- Keep the inside of the ear dry while the infection heals. Do not swim for 7 to 10 days after starting treatment. You can take a shower, but make sure to keep the ear dry. You can put some petroleum jelly (sample brand name: Vaseline) on a cotton ball, and then put the cotton ball in your outer ear, covering the opening of your ear canal. Do not push the cotton ball into the ear canal.  You should also avoid wearing hearing aids or ear buds, or putting anything into the infected ear, until your symptoms improve.  Can an outer ear infection be prevented? -- Sometimes. To reduce your chances of getting an outer ear infection:   Do not put anything into your ears, even to clean them - The inside of the ears do not usually need to be cleaned. It is normal to have some ear wax in your ears. Ear wax protects the ear canal. But if you are worried that you have too much ear wax, talk to your doctor or nurse. They can look in your ears and tell you how to clean them safely.   Follow these tips if you swim a lot:   Shake your ears dry after you swim, or use a blow dryer to help dry them. If you use a blow dryer, put it on the lowest setting and be careful to avoid burns.   Use over-the-counter ear-drying drops after you swim. These usually contain alcohol or vinegar, and might help prevent infection.   Wear ear plugs to prevent water from getting into your  ears. Keep the ear plugs clean, and get new ones if your ear plugs are too dirty or start to fall apart.  Should I see a doctor or nurse? -- Call your doctor or nurse if:   Your symptoms get worse at any point.   Your symptoms have not gone away 6 days after starting treatment.  All topics are updated as new evidence becomes available and our peer review process is complete.  This topic retrieved from [employer] on: Feb 28, 2024.  Topic 51669 Version 19.0  Release: 32.2.4 - C32.58  © 2024 UpToDate, Inc. and/or its affiliates. All rights reserved.  figure 1: Normal ear     This figure shows the normal parts of the outer, middle, and inner ear.  Graphic 00147 Version 5.0  Consumer Information Use and Disclaimer   Disclaimer: This generalized information is a limited summary of diagnosis, treatment, and/or medication information. It is not meant to be comprehensive and should be used as a tool to help the user understand and/or assess potential diagnostic and treatment options. It does NOT include all information about conditions, treatments, medications, side effects, or risks that may apply to a specific patient. It is not intended to be medical advice or a substitute for the medical advice, diagnosis, or treatment of a health care provider based on the health care provider's examination and assessment of a patient's specific and unique circumstances. Patients must speak with a health care provider for complete information about their health, medical questions, and treatment options, including any risks or benefits regarding use of medications. This information does not endorse any treatments or medications as safe, effective, or approved for treating a specific patient. UpToDate, Inc. and its affiliates disclaim any warranty or liability relating to this information or the use thereof.The use of this information is governed by the Terms of Use, available at  https://www.woltersEnviroMissionuwer.com/en/know/clinical-effectiveness-terms. 2024© UpToDate, Inc. and its affiliates and/or licensors. All rights reserved.  Copyright   © 2024 UpToDate, Inc. and/or its affiliates. All rights reserved.        Follow up with PCP in 3-5 days.  Proceed to  ER if symptoms worsen.    Chief Complaint     Chief Complaint   Patient presents with    Earache     Started couple days ago with congestion.  Right ear pain and clear liquid coming out.         History of Present Illness       The patient is a 54-year-old male presenting today for a few days of cough and congestion and now with right ear pain.  Reports that a yellow/clear liquid has been leaking out.        Review of Systems   Review of Systems   Constitutional:  Negative for activity change, appetite change, chills, diaphoresis and fever.   HENT:  Positive for ear pain. Negative for congestion, rhinorrhea and sore throat.    Eyes:  Negative for pain and visual disturbance.   Respiratory:  Negative for cough, chest tightness and shortness of breath.    Cardiovascular:  Negative for chest pain and palpitations.   Gastrointestinal:  Negative for abdominal pain, diarrhea, nausea and vomiting.   Genitourinary:  Negative for dysuria and hematuria.   Musculoskeletal:  Negative for arthralgias, back pain and myalgias.   Skin:  Negative for color change, pallor and rash.   Neurological:  Negative for seizures, syncope and headaches.   All other systems reviewed and are negative.        Current Medications       Current Outpatient Medications:     baclofen (LIORESAL) 10 MG/20ML, by Intrathecal route if needed, Disp: , Rfl:     baclofen 10 mg tablet, TAKE 1 TABLET BY MOUTH THREE TIMES A DAY, Disp: 270 tablet, Rfl: 1    Blood Glucose Monitoring Suppl (OneTouch Verio Flex System) w/Device KIT, CHECK BLOOD SUGARS ONCE DAILY., Disp: 1 kit, Rfl: 0    dapagliflozin (Farxiga) 10 MG tablet, Take 1 tablet (10 mg total) by mouth daily, Disp: 90 tablet, Rfl: 1     glucose blood (OneTouch Verio) test strip, Check blood sugars once daily. Please substitute with appropriate alternative as covered by patient's insurance. Dx: E11.65, Disp: 100 each, Rfl: 3    HYDROcodone-acetaminophen (NORCO) 5-325 mg per tablet, Take 1 tablet by mouth daily as needed (severe pain) Max Daily Amount: 1 tablet, Disp: 30 tablet, Rfl: 0    meloxicam (MOBIC) 15 mg tablet, TAKE 1 TABLET BY MOUTH EVERY DAY, Disp: 30 tablet, Rfl: 2    metFORMIN (GLUCOPHAGE) 1000 MG tablet, TAKE 1 TABLET BY MOUTH TWICE A DAY WITH FOOD, Disp: 180 tablet, Rfl: 1    ofloxacin (FLOXIN) 0.3 % otic solution, Administer 10 drops to the right ear daily for 7 days, Disp: 3.5 mL, Rfl: 0    OneTouch Delica Lancets 33G MISC, Check blood sugars once daily. Please substitute with appropriate alternative as covered by patient's insurance. Dx: E11.65, Disp: 100 each, Rfl: 3    semaglutide, 0.25 or 0.5 mg/dose, (Ozempic, 0.25 or 0.5 MG/DOSE,) 2 mg/3 mL injection pen, Inject 0.25 mg under the skin once weekly for 28 days, THEN inject 0.5 mg under the skin once weekly, Disp: 9 mL, Rfl: 0    sertraline (ZOLOFT) 50 mg tablet, TAKE 1 TABLET BY MOUTH EVERY DAY, Disp: 90 tablet, Rfl: 1    zolpidem (AMBIEN) 10 mg tablet, Take 1 tablet (10 mg total) by mouth daily at bedtime as needed for sleep, Disp: 30 tablet, Rfl: 0    atorvastatin (LIPITOR) 40 mg tablet, Take 1 tablet (40 mg total) by mouth daily (Patient not taking: Reported on 4/25/2025), Disp: 90 tablet, Rfl: 1    ibuprofen (MOTRIN) 600 mg tablet, Take 1 tablet by mouth every 8 (eight) hours as needed for moderate pain (pain) for up to 30 days, Disp: 15 tablet, Rfl: 0    valACYclovir (VALTREX) 1,000 mg tablet, Take 1 tablet (1,000 mg total) by mouth 2 (two) times a day for 7 days, Disp: 14 tablet, Rfl: 0    Current Allergies     Allergies as of 04/25/2025 - Reviewed 04/25/2025   Allergen Reaction Noted    Gadolinium derivatives Itching 09/06/2020    Ozempic (0.25 or 0.5 mg-dose)  "[semaglutide(0.25 or 0.5mg-dos)] Headache 06/06/2023    Trulicity [dulaglutide] Dizziness 06/11/2024            The following portions of the patient's history were reviewed and updated as appropriate: allergies, current medications, past family history, past medical history, past social history, past surgical history and problem list.     Past Medical History:   Diagnosis Date    Diabetes mellitus (HCC)        Past Surgical History:   Procedure Laterality Date    BACK SURGERY      KNEE SURGERY Left     VENTRAL HERNIA REPAIR         Family History   Problem Relation Age of Onset    Diabetes Mother     Lung cancer Father     Skin cancer Father     Prostate cancer Father     Colon cancer Neg Hx     Hypertension Neg Hx     Hyperlipidemia Neg Hx     Thyroid disease Neg Hx          Medications have been verified.        Objective   /74   Pulse 93   Temp 98.1 °F (36.7 °C)   Resp 18   Ht 5' 9\" (1.753 m)   Wt 98 kg (216 lb)   SpO2 94%   BMI 31.90 kg/m²        Physical Exam     Physical Exam  Vitals and nursing note reviewed.   Constitutional:       General: He is not in acute distress.     Appearance: Normal appearance. He is normal weight. He is not ill-appearing, toxic-appearing or diaphoretic.   HENT:      Head: Normocephalic and atraumatic.      Left Ear: Tympanic membrane, ear canal and external ear normal. There is no impacted cerumen.      Ears:      Comments: Swelling of R ear canal   Drainage - yellow clear and fluid bubbles by the TM   Part of the TM visualized and intact.      Nose: Nose normal. No congestion or rhinorrhea.      Mouth/Throat:      Mouth: Mucous membranes are moist.      Pharynx: Oropharynx is clear. No oropharyngeal exudate or posterior oropharyngeal erythema.   Cardiovascular:      Rate and Rhythm: Normal rate and regular rhythm.      Heart sounds: Normal heart sounds. No murmur heard.     No friction rub. No gallop.   Pulmonary:      Effort: Pulmonary effort is normal. No " respiratory distress.      Breath sounds: Normal breath sounds. No stridor. No wheezing, rhonchi or rales.   Chest:      Chest wall: No tenderness.   Abdominal:      General: Abdomen is flat. Bowel sounds are normal. There is no distension.      Palpations: Abdomen is soft. There is no mass.      Tenderness: There is no abdominal tenderness. There is no guarding or rebound.      Hernia: No hernia is present.   Musculoskeletal:         General: Normal range of motion.      Cervical back: Normal range of motion.   Lymphadenopathy:      Cervical: No cervical adenopathy.   Skin:     General: Skin is warm and dry.      Capillary Refill: Capillary refill takes less than 2 seconds.   Neurological:      Mental Status: He is alert.

## 2025-04-28 RX ORDER — ZOLPIDEM TARTRATE 10 MG/1
10 TABLET ORAL
Qty: 30 TABLET | Refills: 0 | OUTPATIENT
Start: 2025-04-28

## 2025-04-28 NOTE — TELEPHONE ENCOUNTER
Pt did not follow up in December  and has not scheduled follow ups. Needs labs and needs appt for refill.         See prior message above- I don't see documentation he was called or message relayed. Please document that this message is given to pt. Call for appt.

## 2025-04-28 NOTE — TELEPHONE ENCOUNTER
1 [id number] 03/25/2025 03/25/2025 Zolpidem Tartrate (Tablet) 30.0 30 10 MG NA [facility] Medicare 0 / 0 [place]    1 [id number] 02/20/2025 02/20/2025 Zolpidem Tartrate (Tablet) 30.0 30 10 MG NA [facility] Medicare 0 / 0 PA    1 [id number] 01/23/2025 01/23/2025 Zolpidem Tartrate (Tablet) 30.0 30 10 MG NA [facility] Medicare 0 / 0 PA    1 [id number] 12/23/2024 12/23/2024 Zolpidem Tartrate (Tablet) 30.0 30 10 MG NA [facility] JAV[facility]OGODZ[facility] Medicare 0 / 0 PA    1 [id number] 11/22/2024 11/22/2024 Zolpidem Tartrate (Tablet) 30.0 30 10 MG NA [facility] Medicare 0 / 0 PA    1 [id number] 10/23/2024 10/23/2024 Zolpidem Tartrate (Tablet) 30.0 30 10 MG NA RISHABH [facility] Medicare 0 / 0 PA    1 [id number] 09/23/2024 09/23/2024 Zolpidem Tartrate (Tablet) 30.0 30 10 MG NA [facility] Medicare 0 / 0 PA    1 [id number] 09/09/2024 09/03/2024 HYDROcodone BITARTRATE 5 MG ORAL TABLET/ACETAMINOPHEN 325 MG (Tablet) 30.0 30 325 MG-5 MG 5.0 [facility] Medicare 0 / 0 PA    1 [id number] 08/23/2024 08/23/2024 Zolpidem Tartrate (Tablet) 30.0 30 10 MG NA [facility] Medicare 0 / 0 PA    1 [id number] 07/25/2024 07/25/2024 Zolpidem Tartrate (Tablet) 30.0 30 10 MG NA ELSPETH BLACK-MIRELES [facility] Medicare 0 / 0 PA

## 2025-04-29 DIAGNOSIS — F51.01 PRIMARY INSOMNIA: ICD-10-CM

## 2025-04-29 NOTE — TELEPHONE ENCOUNTER
Patient called in and scheduled appointment for 05/21. Patient stated has been out of medication 3 days.Please advise. Thank you

## 2025-04-29 NOTE — TELEPHONE ENCOUNTER
Patient was made aware he is due for labs and an appointment he is being transferred to scheduling. He is asking for a courtesy fill.

## 2025-04-30 RX ORDER — ZOLPIDEM TARTRATE 10 MG/1
10 TABLET ORAL
Qty: 30 TABLET | Refills: 0 | Status: SHIPPED | OUTPATIENT
Start: 2025-04-30

## 2025-04-30 NOTE — TELEPHONE ENCOUNTER
1 [id number] 03/25/2025 03/25/2025 Zolpidem Tartrate (Tablet) 30.0 30 10 MG NA [facility] Medicare 0 / 0 [place]    1 [id number] 02/20/2025 02/20/2025 Zolpidem Tartrate (Tablet) 30.0 30 10 MG NA [facility] Medicare 0 / 0 PA    1 [id number] 01/23/2025 01/23/2025 Zolpidem Tartrate (Tablet) 30.0 30 10 MG NA [facility] Medicare 0 / 0 PA    1 [id number] 12/23/2024 12/23/2024 Zolpidem Tartrate (Tablet) 30.0 30 10 MG NA ROBERT JAVIER POGODZINSKI [facility] Medicare 0 / 0 PA    1 [id number] 11/22/2024 11/22/2024 Zolpidem Tartrate (Tablet) 30.0 30 10 MG NA [facility] Medicare 0 / 0 PA    1 [id number] 10/23/2024 10/23/2024 Zolpidem Tartrate (Tablet) 30.0 30 10 MG NA RISHABH [facility] Medicare 0 / 0 PA    1 [id number] 09/23/2024 09/23/2024 Zolpidem Tartrate (Tablet) 30.0 30 10 MG NA [facility] Medicare 0 / 0 PA    1 [id number] 09/09/2024 09/03/2024 HYDROcodone BITARTRATE 5 MG ORAL TABLET/ACETAMINOPHEN 325 MG (Tablet) 30.0 30 325 MG-5 MG 5.0 [facility] Medicare 0 / 0 PA    1 [id number] 08/23/2024 08/23/2024 Zolpidem Tartrate (Tablet) 30.0 30 10 MG NA [facility] Medicare 0 / 0 PA    1 [id number] 07/25/2024 07/25/2024 Zolpidem Tartrate (Tablet) 30.0 30 10 MG NA ELSPETH BLACK-MIRELES [facility] Medicare 0 / 0 PA

## 2025-05-01 DIAGNOSIS — E11.65 CONTROLLED TYPE 2 DIABETES MELLITUS WITH HYPERGLYCEMIA, WITHOUT LONG-TERM CURRENT USE OF INSULIN (HCC): ICD-10-CM

## 2025-05-05 ENCOUNTER — OFFICE VISIT (OUTPATIENT)
Dept: FAMILY MEDICINE CLINIC | Facility: CLINIC | Age: 55
End: 2025-05-05
Payer: COMMERCIAL

## 2025-05-05 VITALS
WEIGHT: 217 LBS | TEMPERATURE: 97.9 F | SYSTOLIC BLOOD PRESSURE: 118 MMHG | OXYGEN SATURATION: 95 % | HEART RATE: 80 BPM | DIASTOLIC BLOOD PRESSURE: 78 MMHG | BODY MASS INDEX: 31.07 KG/M2 | HEIGHT: 70 IN

## 2025-05-05 DIAGNOSIS — G83.9 SPASTIC PARESIS (HCC): ICD-10-CM

## 2025-05-05 DIAGNOSIS — F33.1 MODERATE RECURRENT MAJOR DEPRESSION (HCC): ICD-10-CM

## 2025-05-05 DIAGNOSIS — F11.20 CONTINUOUS OPIOID DEPENDENCE (HCC): ICD-10-CM

## 2025-05-05 DIAGNOSIS — H65.191 OTHER NON-RECURRENT ACUTE NONSUPPURATIVE OTITIS MEDIA OF RIGHT EAR: Primary | ICD-10-CM

## 2025-05-05 DIAGNOSIS — S24.109S: ICD-10-CM

## 2025-05-05 DIAGNOSIS — E11.42 DIABETIC POLYNEUROPATHY ASSOCIATED WITH TYPE 2 DIABETES MELLITUS (HCC): ICD-10-CM

## 2025-05-05 DIAGNOSIS — J96.10 CHRONIC RESPIRATORY FAILURE, UNSPECIFIED WHETHER WITH HYPOXIA OR HYPERCAPNIA (HCC): ICD-10-CM

## 2025-05-05 PROCEDURE — G2211 COMPLEX E/M VISIT ADD ON: HCPCS | Performed by: NURSE PRACTITIONER

## 2025-05-05 PROCEDURE — 99214 OFFICE O/P EST MOD 30 MIN: CPT | Performed by: NURSE PRACTITIONER

## 2025-05-05 RX ORDER — AMOXICILLIN 500 MG/1
500 TABLET, FILM COATED ORAL 2 TIMES DAILY
Qty: 14 TABLET | Refills: 0 | Status: SHIPPED | OUTPATIENT
Start: 2025-05-05 | End: 2025-05-12

## 2025-05-05 NOTE — ASSESSMENT & PLAN NOTE
Chronic - stable - continue to monitor and continue to follow with pain management for baclofen pump

## 2025-05-05 NOTE — PROGRESS NOTES
Name: Ankush Richmond      : 1970      MRN: [id number]  Encounter Provider: GUERRERO Quick  Encounter Date: 2025   Encounter department: [facility]  :  Assessment & Plan  Other non-recurrent acute nonsuppurative otitis media of right ear    Orders:  •  amoxicillin (AMOXIL) 500 MG tablet; Take 1 tablet (500 mg total) by mouth 2 (two) times a day for 7 days    Thoracic cord injury without spinal bone injury, sequela (HCC)  Chronic - stable - continue to monitor       Continuous opioid dependence (HCC)  Patient being followed by PCP        Chronic respiratory failure, unspecified whether with hypoxia or hypercapnia (HCC)  Chronic - stable - continue to monitor       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HCC)  Chronic - stable - continue to monitor and continue to follow with pain management for baclofen pump       Moderate recurrent major depression (HCC)  Chronic - stable - continue to monitor and continue on current medication         Diabetic polyneuropathy associated with type 2 diabetes mellitus (HCC)  Chronic - not at goal and worsening - patient no compliant with visits and medications  Continue on Metformin and Farxiga - follow-up with PCP for medication management  Lab Results   Component Value Date    HGBA1C 8.5 (A) 2024     Patient instructed to return on 2025 with previously scheduled appointment with PCP for next follow-up/exam - sooner if needed                  History of Present Illness   54 y.o.male presenting with ear pain for the past 2 weeks. He did go to [facility] on 2025 at start of the ear pain. He was diagnosed with morgan externa and given ofloxacin drops.H reports that the drops only helped a little.       Review of Systems   Constitutional: Negative.    HENT:  Positive for ear pain.    Respiratory: Negative.     Cardiovascular: Negative.    Gastrointestinal: Negative.    Musculoskeletal: Negative.    Neurological: Negative.   "  Psychiatric/Behavioral: Negative.         Objective   /78   Pulse 80   Temp 97.9 °F (36.6 °C)   Ht 5' 10\" (1.778 m)   Wt 98.4 kg (217 lb)   SpO2 95%   BMI 31.14 kg/m² (Reviewed)     Physical Exam  Vitals reviewed.   Constitutional:       General: He is not in acute distress.     Appearance: He is not ill-appearing.   HENT:      Head: Normocephalic and atraumatic.      Right Ear: Ear canal and external ear normal. Tenderness present. A middle ear effusion is present. Tympanic membrane is erythematous.      Left Ear: Tympanic membrane, ear canal and external ear normal.      Nose: Nose normal.      Mouth/Throat:      Mouth: Mucous membranes are moist.      Pharynx: Oropharynx is clear.   Eyes:      Extraocular Movements: Extraocular movements intact.      Conjunctiva/sclera: Conjunctivae normal.      Pupils: Pupils are equal, round, and reactive to light.   Cardiovascular:      Rate and Rhythm: Normal rate and regular rhythm.      Heart sounds: Normal heart sounds.   Pulmonary:      Effort: Pulmonary effort is normal.      Breath sounds: Normal breath sounds.   Musculoskeletal:      Cervical back: Neck supple.   Lymphadenopathy:      Cervical: No cervical adenopathy.   Skin:     General: Skin is warm and dry.      Capillary Refill: Capillary refill takes less than 2 seconds.   Neurological:      Mental Status: He is alert and oriented to person, place, and time.   Psychiatric:         Mood and Affect: Mood normal.         Behavior: Behavior normal.           BMI Counseling: Body mass index is 31.14 kg/m². The BMI is above normal. Nutrition recommendations include reducing portion sizes, decreasing overall calorie intake, 3-5 servings of fruits/vegetables daily, reducing fast food intake, consuming healthier snacks, decreasing soda and/or juice intake, moderation in carbohydrate intake, and increasing intake of lean protein. Exercise recommendations include moderate aerobic physical activity for 150 " minutes/week.

## 2025-05-20 DIAGNOSIS — F33.0 MILD EPISODE OF RECURRENT MAJOR DEPRESSIVE DISORDER (HCC): ICD-10-CM

## 2025-05-21 ENCOUNTER — OFFICE VISIT (OUTPATIENT)
Dept: FAMILY MEDICINE CLINIC | Facility: CLINIC | Age: 55
End: 2025-05-21
Payer: COMMERCIAL

## 2025-05-21 ENCOUNTER — APPOINTMENT (OUTPATIENT)
Dept: LAB | Facility: CLINIC | Age: 55
End: 2025-05-21
Payer: COMMERCIAL

## 2025-05-21 VITALS
TEMPERATURE: 97.3 F | DIASTOLIC BLOOD PRESSURE: 80 MMHG | OXYGEN SATURATION: 96 % | BODY MASS INDEX: 30.64 KG/M2 | WEIGHT: 214 LBS | SYSTOLIC BLOOD PRESSURE: 126 MMHG | HEIGHT: 70 IN | HEART RATE: 97 BPM

## 2025-05-21 DIAGNOSIS — S24.109S: ICD-10-CM

## 2025-05-21 DIAGNOSIS — H69.91 DYSFUNCTION OF RIGHT EUSTACHIAN TUBE: ICD-10-CM

## 2025-05-21 DIAGNOSIS — F51.01 PRIMARY INSOMNIA: ICD-10-CM

## 2025-05-21 DIAGNOSIS — E78.2 MIXED HYPERLIPIDEMIA: ICD-10-CM

## 2025-05-21 DIAGNOSIS — E11.65 TYPE 2 DIABETES MELLITUS WITH HYPERGLYCEMIA, WITHOUT LONG-TERM CURRENT USE OF INSULIN (HCC): Primary | ICD-10-CM

## 2025-05-21 DIAGNOSIS — E11.65 TYPE 2 DIABETES MELLITUS WITH HYPERGLYCEMIA, WITHOUT LONG-TERM CURRENT USE OF INSULIN (HCC): ICD-10-CM

## 2025-05-21 LAB
ALBUMIN SERPL BCG-MCNC: 4.8 G/DL (ref 3.5–5)
ALP SERPL-CCNC: 112 U/L (ref 34–104)
ALT SERPL W P-5'-P-CCNC: 21 U/L (ref 7–52)
ANION GAP SERPL CALCULATED.3IONS-SCNC: 9 MMOL/L (ref 4–13)
AST SERPL W P-5'-P-CCNC: 17 U/L (ref 13–39)
BILIRUB SERPL-MCNC: 0.66 MG/DL (ref 0.2–1)
BUN SERPL-MCNC: 19 MG/DL (ref 5–25)
CALCIUM SERPL-MCNC: 10.2 MG/DL (ref 8.4–10.2)
CHLORIDE SERPL-SCNC: 99 MMOL/L (ref 96–108)
CHOLEST SERPL-MCNC: 216 MG/DL (ref ?–200)
CO2 SERPL-SCNC: 32 MMOL/L (ref 21–32)
CREAT SERPL-MCNC: 0.95 MG/DL (ref 0.6–1.3)
ERYTHROCYTE [DISTWIDTH] IN BLOOD BY AUTOMATED COUNT: 12.8 % (ref 11.6–15.1)
EST. AVERAGE GLUCOSE BLD GHB EST-MCNC: 229 MG/DL
GFR SERPL CREATININE-BSD FRML MDRD: 88 ML/MIN/1.73SQ M
GLUCOSE P FAST SERPL-MCNC: 172 MG/DL (ref 65–99)
HBA1C MFR BLD: 9.6 %
HCT VFR BLD AUTO: 53.6 % (ref 36.5–49.3)
HDLC SERPL-MCNC: 62 MG/DL
HGB BLD-MCNC: 16.2 G/DL (ref 12–17)
LDLC SERPL CALC-MCNC: 125 MG/DL (ref 0–100)
MCH RBC QN AUTO: 27.7 PG (ref 26.8–34.3)
MCHC RBC AUTO-ENTMCNC: 30.2 G/DL (ref 31.4–37.4)
MCV RBC AUTO: 92 FL (ref 82–98)
PLATELET # BLD AUTO: 255 THOUSANDS/UL (ref 149–390)
PMV BLD AUTO: 9.9 FL (ref 8.9–12.7)
POTASSIUM SERPL-SCNC: 5 MMOL/L (ref 3.5–5.3)
PROT SERPL-MCNC: 8.1 G/DL (ref 6.4–8.4)
RBC # BLD AUTO: 5.85 MILLION/UL (ref 3.88–5.62)
SL AMB POCT HEMOGLOBIN AIC: 9.7 (ref ?–6.5)
SODIUM SERPL-SCNC: 140 MMOL/L (ref 135–147)
TRIGL SERPL-MCNC: 144 MG/DL (ref ?–150)
TSH SERPL DL<=0.05 MIU/L-ACNC: 2.43 UIU/ML (ref 0.45–4.5)
WBC # BLD AUTO: 6.24 THOUSAND/UL (ref 4.31–10.16)

## 2025-05-21 PROCEDURE — G2211 COMPLEX E/M VISIT ADD ON: HCPCS | Performed by: FAMILY MEDICINE

## 2025-05-21 PROCEDURE — 83036 HEMOGLOBIN GLYCOSYLATED A1C: CPT | Performed by: FAMILY MEDICINE

## 2025-05-21 PROCEDURE — 85027 COMPLETE CBC AUTOMATED: CPT

## 2025-05-21 PROCEDURE — 80053 COMPREHEN METABOLIC PANEL: CPT

## 2025-05-21 PROCEDURE — 84443 ASSAY THYROID STIM HORMONE: CPT

## 2025-05-21 PROCEDURE — 83036 HEMOGLOBIN GLYCOSYLATED A1C: CPT

## 2025-05-21 PROCEDURE — 99214 OFFICE O/P EST MOD 30 MIN: CPT | Performed by: FAMILY MEDICINE

## 2025-05-21 PROCEDURE — 80061 LIPID PANEL: CPT

## 2025-05-21 PROCEDURE — 36415 COLL VENOUS BLD VENIPUNCTURE: CPT

## 2025-05-21 RX ORDER — ACYCLOVIR 400 MG/1
1 TABLET ORAL
Qty: 9 EACH | Refills: 3 | Status: SHIPPED | OUTPATIENT
Start: 2025-05-21

## 2025-05-21 RX ORDER — PREDNISONE 20 MG/1
40 TABLET ORAL DAILY
Qty: 10 TABLET | Refills: 0 | Status: SHIPPED | OUTPATIENT
Start: 2025-05-21 | End: 2025-05-26

## 2025-05-21 RX ORDER — INSULIN GLARGINE 100 [IU]/ML
10 INJECTION, SOLUTION SUBCUTANEOUS
Qty: 9 ML | Refills: 1 | Status: SHIPPED | OUTPATIENT
Start: 2025-05-21

## 2025-05-21 NOTE — ASSESSMENT & PLAN NOTE
Lab Results   Component Value Date    HGBA1C 9.7 (A) 05/21/2025       Orders:  •  POCT hemoglobin A1c

## 2025-05-21 NOTE — PROGRESS NOTES
Name: Ankush Richmond      : 1970      MRN: [id number]  Encounter Provider: Shanice Mcadams MD  Encounter Date: 2025   Encounter department: [facility]    Assessment & Plan  Type 2 diabetes mellitus with hyperglycemia, without long-term current use of insulin (HCC)    Lab Results   Component Value Date    HGBA1C 9.6 (H) 2025       Orders:  •  POCT hemoglobin A1c  •  Continuous Glucose Sensor (Dexcom G7 Sensor); Use 1 Device every 10 days  •  Insulin Glargine Solostar (Lantus SoloStar) 100 UNIT/ML SOPN; Inject 0.1 mL (10 Units total) under the skin daily at bedtime  •  metFORMIN (GLUCOPHAGE) 1000 MG tablet; Take 1 tablet (1,000 mg total) by mouth 2 (two) times a day with meals  •  dapagliflozin (Farxiga) 10 MG tablet; Take 1 tablet (10 mg total) by mouth daily    Dysfunction of right eustachian tube  If not improving would recommend ENT follow up   Orders:  •  predniSONE 20 mg tablet; Take 2 tablets (40 mg total) by mouth daily for 5 days    Thoracic cord injury without spinal bone injury, sequela (HCC)  Chronic, managed by specialist with baclofen pump       Primary insomnia  Chronic, managed on Ambien  We discussed medication will not be filled if he does not present for visits regularly  PDMP reviewed below   Orders:  •  zolpidem (AMBIEN) 10 mg tablet; Take 1 tablet (10 mg total) by mouth daily at bedtime as needed for sleep Do not start before May 28, 2025.    Mixed hyperlipidemia    Orders:  •  atorvastatin (LIPITOR) 40 mg tablet; Take 1 tablet (40 mg total) by mouth daily      2025 1 Zolpidem Tartrate 10 Mg Tablet 30.00 30 [place] [id number] Pen (7267) 0 0.50 LME Medicare [place]                       History of Present Illness   Chief Complaint   Patient presents with   • Follow-up   • right ear clogged       HPI Patient had only been taking the metformin for the last few months, just started the Farxiga again.      Review of Systems   Constitutional:   "Negative for chills and fever.   HENT:  Negative for congestion and sore throat.    Eyes:  Negative for pain and visual disturbance.   Respiratory:  Negative for cough and shortness of breath.    Cardiovascular:  Negative for chest pain and palpitations.   Gastrointestinal:  Negative for abdominal pain and nausea.   Genitourinary:  Negative for dysuria.   Musculoskeletal:  Positive for arthralgias. Negative for myalgias.   Skin:  Negative for rash and wound.   Neurological:  Positive for numbness. Negative for dizziness and headaches.   All other systems reviewed and are negative.    Objective   /80   Pulse 97   Temp (!) 97.3 °F (36.3 °C)   Ht 5' 10\" (1.778 m)   Wt 97.1 kg (214 lb)   SpO2 96%   BMI 30.71 kg/m²      Physical Exam  Vitals and nursing note reviewed.   Constitutional:       General: He is not in acute distress.     Appearance: He is well-developed. He is not diaphoretic.   HENT:      Head: Normocephalic and atraumatic.      Right Ear: External ear normal.      Left Ear: External ear normal.     Eyes:      Conjunctiva/sclera: Conjunctivae normal.     Pulmonary:      Effort: Pulmonary effort is normal. No respiratory distress.     Skin:     Findings: No rash.     Neurological:      Mental Status: He is alert.      Cranial Nerves: No cranial nerve deficit.       "

## 2025-05-21 NOTE — Clinical Note
New insulin patient - call patient for glucose #'s / review Dexcom to make sure it's connected and make insulin adjustments.

## 2025-05-21 NOTE — Clinical Note
Please call pt and let him know our clinic code for the Dexcom is [id number]  If he has any problems with setting up the Dexcom, encourage him to schedule a nurse visit to review settings.

## 2025-05-22 ENCOUNTER — TELEPHONE (OUTPATIENT)
Dept: FAMILY MEDICINE CLINIC | Facility: CLINIC | Age: 55
End: 2025-05-22

## 2025-05-22 PROBLEM — F51.01 PRIMARY INSOMNIA: Status: ACTIVE | Noted: 2025-05-22

## 2025-05-22 RX ORDER — DAPAGLIFLOZIN 10 MG/1
10 TABLET, FILM COATED ORAL DAILY
Qty: 90 TABLET | Refills: 1 | Status: SHIPPED | OUTPATIENT
Start: 2025-05-22

## 2025-05-22 RX ORDER — ZOLPIDEM TARTRATE 10 MG/1
10 TABLET ORAL
Qty: 30 TABLET | Refills: 0 | Status: SHIPPED | OUTPATIENT
Start: 2025-05-28

## 2025-05-22 RX ORDER — ATORVASTATIN CALCIUM 40 MG/1
40 TABLET, FILM COATED ORAL DAILY
Qty: 90 TABLET | Refills: 1 | Status: SHIPPED | OUTPATIENT
Start: 2025-05-22

## 2025-05-22 NOTE — ASSESSMENT & PLAN NOTE
Chronic, managed on Ambien  We discussed medication will not be filled if he does not present for visits regularly  PDMP reviewed below   Orders:  •  zolpidem (AMBIEN) 10 mg tablet; Take 1 tablet (10 mg total) by mouth daily at bedtime as needed for sleep Do not start before May 28, 2025.

## 2025-05-28 ENCOUNTER — TELEPHONE (OUTPATIENT)
Dept: FAMILY MEDICINE CLINIC | Facility: CLINIC | Age: 55
End: 2025-05-28

## 2025-05-28 DIAGNOSIS — E11.65 TYPE 2 DIABETES MELLITUS WITH HYPERGLYCEMIA, WITHOUT LONG-TERM CURRENT USE OF INSULIN (HCC): ICD-10-CM

## 2025-05-28 RX ORDER — INSULIN GLARGINE 100 [IU]/ML
10 INJECTION, SOLUTION SUBCUTANEOUS
Qty: 9 ML | Refills: 1 | Status: SHIPPED | OUTPATIENT
Start: 2025-05-28

## 2025-05-28 RX ORDER — PEN NEEDLE, DIABETIC 32GX 5/32"
NEEDLE, DISPOSABLE MISCELLANEOUS
Qty: 100 EACH | Refills: 3 | Status: SHIPPED | OUTPATIENT
Start: 2025-05-28

## 2025-05-29 ENCOUNTER — APPOINTMENT (OUTPATIENT)
Dept: LAB | Facility: CLINIC | Age: 55
End: 2025-05-29
Payer: COMMERCIAL

## 2025-05-29 LAB
BACTERIA UR QL AUTO: ABNORMAL /HPF
BILIRUB UR QL STRIP: NEGATIVE
CLARITY UR: ABNORMAL
COLOR UR: ABNORMAL
CREAT UR-MCNC: 185.3 MG/DL
GLUCOSE UR STRIP-MCNC: ABNORMAL MG/DL
HGB UR QL STRIP.AUTO: NEGATIVE
KETONES UR STRIP-MCNC: NEGATIVE MG/DL
LEUKOCYTE ESTERASE UR QL STRIP: ABNORMAL
MICROALBUMIN UR-MCNC: 8.2 MG/L
MICROALBUMIN/CREAT 24H UR: 4 MG/G CREATININE (ref 0–30)
MUCOUS THREADS UR QL AUTO: ABNORMAL
NITRITE UR QL STRIP: NEGATIVE
NON-SQ EPI CELLS URNS QL MICRO: ABNORMAL /HPF
PH UR STRIP.AUTO: 6 [PH]
PROT UR STRIP-MCNC: ABNORMAL MG/DL
RBC #/AREA URNS AUTO: ABNORMAL /HPF
SP GR UR STRIP.AUTO: 1.04 (ref 1–1.03)
UROBILINOGEN UR STRIP-ACNC: 2 MG/DL
WBC #/AREA URNS AUTO: ABNORMAL /HPF

## 2025-05-29 PROCEDURE — 82570 ASSAY OF URINE CREATININE: CPT

## 2025-05-29 PROCEDURE — 82043 UR ALBUMIN QUANTITATIVE: CPT

## 2025-05-29 PROCEDURE — 81001 URINALYSIS AUTO W/SCOPE: CPT

## 2025-06-01 ENCOUNTER — HOSPITAL ENCOUNTER (EMERGENCY)
Facility: HOSPITAL | Age: 55
Discharge: HOME | End: 2025-06-01
Attending: EMERGENCY MEDICINE | Admitting: EMERGENCY MEDICINE
Payer: COMMERCIAL

## 2025-06-01 ENCOUNTER — APPOINTMENT (OUTPATIENT)
Dept: RADIOLOGY | Facility: HOSPITAL | Age: 55
End: 2025-06-01
Payer: COMMERCIAL

## 2025-06-01 ENCOUNTER — APPOINTMENT (EMERGENCY)
Dept: RADIOLOGY | Facility: HOSPITAL | Age: 55
End: 2025-06-01
Payer: COMMERCIAL

## 2025-06-01 VITALS
DIASTOLIC BLOOD PRESSURE: 74 MMHG | SYSTOLIC BLOOD PRESSURE: 136 MMHG | RESPIRATION RATE: 20 BRPM | TEMPERATURE: 99.2 F | HEART RATE: 86 BPM | OXYGEN SATURATION: 97 %

## 2025-06-01 DIAGNOSIS — S52.501A DISTAL RADIUS FRACTURE, RIGHT: Primary | ICD-10-CM

## 2025-06-01 PROCEDURE — 73100 X-RAY EXAM OF WRIST: CPT

## 2025-06-01 PROCEDURE — 99284 EMERGENCY DEPT VISIT MOD MDM: CPT

## 2025-06-01 PROCEDURE — 73090 X-RAY EXAM OF FOREARM: CPT

## 2025-06-01 PROCEDURE — 73110 X-RAY EXAM OF WRIST: CPT

## 2025-06-01 RX ORDER — OXYCODONE HYDROCHLORIDE 5 MG/1
5 TABLET ORAL EVERY 4 HOURS PRN
Refills: 0 | Status: DISCONTINUED | OUTPATIENT
Start: 2025-06-01 | End: 2025-06-01

## 2025-06-01 RX ORDER — OXYCODONE HYDROCHLORIDE 5 MG/1
5 TABLET ORAL ONCE
Refills: 0 | Status: COMPLETED | OUTPATIENT
Start: 2025-06-01 | End: 2025-06-01

## 2025-06-01 RX ORDER — OXYCODONE HYDROCHLORIDE 5 MG/1
5 TABLET ORAL EVERY 6 HOURS PRN
Qty: 12 TABLET | Refills: 0 | Status: ON HOLD | OUTPATIENT
Start: 2025-06-01 | End: 2025-06-11 | Stop reason: ALTCHOICE

## 2025-06-01 RX ADMIN — OXYCODONE HYDROCHLORIDE 5 MG: 5 TABLET ORAL at 16:56

## 2025-06-01 NOTE — ED PROVIDER NOTES
ED Disposition       None          Assessment & Plan       Medical Decision Making  Amount and/or Complexity of Data Reviewed  Radiology: independent interpretation performed.    Risk  Prescription drug management.    Right hand xray shows fracture on the radial side of right hand. Cleaned the wound with betadine. Placed patient's hand in surgical finger trap with a 10 pound weight. For pain oxycodone 5 mg once.  After 15-20 minutes in the surgical trap there was a reduction in swelling and there is more alignment. Wrapped in ace bandage.          Medications   oxyCODONE (ROXICODONE) IR tablet 5 mg (5 mg Oral Given 6/1/25 3146)       ED Risk Strat Scores                    No data recorded                            History of Present Illness       Chief Complaint   Patient presents with    Fall     Pt states had a mechanical fall and injured his R forearm. +pulse and sensation in RUE       Past Medical History[1]   Past Surgical History[2]   Family History[3]   Social History[4]   E-Cigarette/Vaping    E-Cigarette Use Never User       E-Cigarette/Vaping Substances    Nicotine No     THC No     CBD No     Flavoring No     Other No     Unknown No       I have reviewed and agree with the history as documented.       Fall  Associated symptoms: no abdominal pain, no back pain, no chest pain, no neck pain, no seizures and no vomiting        Patient is a 55 year-old male with a history spinal cord tumor status postresection complicated by neuropathy, diabetes type 2, history of multiple falls who presents post fall.  Patient fell on the handle of a lawnmower left before coming to the ED he excellently tripped over it and he fell onto his right wrist.  He fell on the ulnar side of his left hand he denied head strike or falling on his back.  He has neuropathy as a complication of his final cord tumor resection.  Patient's fiancé immediately brought him into the ED.  She states that he is at his baseline ambulation and  sensation ability.  He rated his hand wrist pain at a 6 out of 10 he normally cannot feel much.  He is right-handed.  He has an allergy to iodine he got his tetanus shot left knee surgery recently less than 10 years ago.    Review of Systems   Constitutional:  Negative for chills and fever.   HENT:  Negative for ear pain and sore throat.    Eyes:  Negative for pain and visual disturbance.   Respiratory:  Negative for cough and shortness of breath.    Cardiovascular:  Negative for chest pain and palpitations.   Gastrointestinal:  Negative for abdominal pain and vomiting.   Genitourinary:  Negative for dysuria and hematuria.   Musculoskeletal:  Positive for gait problem and joint swelling. Negative for arthralgias, back pain, myalgias, neck pain and neck stiffness.   Skin:  Positive for color change and wound. Negative for pallor and rash.   Allergic/Immunologic: Negative.    Neurological:  Negative for seizures and syncope.   Hematological: Negative.    Psychiatric/Behavioral: Negative.     All other systems reviewed and are negative.          Objective       ED Triage Vitals [06/01/25 1518]   Temperature Pulse Blood Pressure Respirations SpO2 Patient Position - Orthostatic VS   99.2 °F (37.3 °C) 86 136/74 20 97 % --      Temp Source Heart Rate Source BP Location FiO2 (%) Pain Score    Oral Monitor Left arm -- --      Vitals      Date and Time Temp Pulse SpO2 Resp BP Pain Score FACES Pain Rating User   06/01/25 1518 99.2 °F (37.3 °C) 86 97 % 20 136/74 -- -- ZC            Physical Exam  Constitutional:       Appearance: Normal appearance. He is normal weight.   HENT:      Head: Normocephalic and atraumatic.      Mouth/Throat:      Mouth: Mucous membranes are moist.     Eyes:      Extraocular Movements: Extraocular movements intact.      Conjunctiva/sclera: Conjunctivae normal.      Pupils: Pupils are equal, round, and reactive to light.       Cardiovascular:      Pulses: Normal pulses.      Heart sounds: Normal heart  sounds.   Pulmonary:      Effort: Pulmonary effort is normal.      Breath sounds: Normal breath sounds.   Abdominal:      General: Abdomen is flat.     Musculoskeletal:         General: Swelling present. No tenderness.      Right lower leg: No edema.      Left lower leg: No edema.      Comments: Mild laceration along dorsal aspect of wrist. Some scraps on the right ulnar side.   Mild blood loss   Swelling present on the ulnar side of dorsum. No erythema or active oozing  Pulses appreciable   Patient is reduced motor strength of right hand. Cannot make an okay sign with fingers.  No reported tenderness on palpation     Skin:     Capillary Refill: Capillary refill takes less than 2 seconds.     Neurological:      General: No focal deficit present.      Mental Status: He is alert and oriented to person, place, and time. Mental status is at baseline.      Cranial Nerves: No cranial nerve deficit.      Sensory: Sensory deficit present.      Motor: Weakness present.      Coordination: Coordination abnormal.      Gait: Gait abnormal.      Comments: At baseline coordination and sensory deficit due to spinal cord tumor resection      Psychiatric:         Mood and Affect: Mood normal.         Behavior: Behavior normal.         Thought Content: Thought content normal.         Judgment: Judgment normal.       Results Reviewed       None            XR forearm 2 views RIGHT   ED Interpretation by Kt Holguin MD (06/01 1647)   Comminuted displaced distal radius fracture.      XR wrist 3+ views RIGHT   ED Interpretation by Kt Holguin MD (06/01 1647)   Comminuted displaced distal radius fracture.          Procedures    ED Medication and Procedure Management   Prior to Admission Medications   Prescriptions Last Dose Informant Patient Reported? Taking?   Blood Glucose Monitoring Suppl (OneTouch Verio Flex System) w/Device KIT  Self No No   Sig: CHECK BLOOD SUGARS ONCE DAILY.   Continuous Glucose Sensor (Dexcom G7  Sensor)   No No   Sig: Use 1 Device every 10 days   Insulin Glargine Solostar (Lantus SoloStar) 100 UNIT/ML SOPN   No No   Sig: Inject 0.1 mL (10 Units total) under the skin daily at bedtime   Insulin Pen Needle (BD Pen Needle Tami U/F) 32G X 4 MM MISC   No No   Sig: Use daily as directed with insulin pen   OneTouch Delica Lancets 33G MISC  Self No No   Sig: Check blood sugars once daily. Please substitute with appropriate alternative as covered by patient's insurance. Dx: E11.65   atorvastatin (LIPITOR) 40 mg tablet   No No   Sig: Take 1 tablet (40 mg total) by mouth daily   baclofen (LIORESAL) 10 MG/20ML  Self Yes No   Sig: by Intrathecal route if needed   baclofen 10 mg tablet  Self No No   Sig: TAKE 1 TABLET BY MOUTH THREE TIMES A DAY   dapagliflozin (Farxiga) 10 MG tablet   No No   Sig: Take 1 tablet (10 mg total) by mouth daily   glucose blood (OneTouch Verio) test strip  Self No No   Sig: Check blood sugars once daily. Please substitute with appropriate alternative as covered by patient's insurance. Dx: E11.65   meloxicam (MOBIC) 15 mg tablet  Self No No   Sig: TAKE 1 TABLET BY MOUTH EVERY DAY   metFORMIN (GLUCOPHAGE) 1000 MG tablet   No No   Sig: Take 1 tablet (1,000 mg total) by mouth 2 (two) times a day with meals   sertraline (ZOLOFT) 50 mg tablet  Self No No   Sig: TAKE 1 TABLET BY MOUTH EVERY DAY   zolpidem (AMBIEN) 10 mg tablet   No No   Sig: Take 1 tablet (10 mg total) by mouth daily at bedtime as needed for sleep Do not start before May 28, 2025.      Facility-Administered Medications: None     Patient's Medications   Discharge Prescriptions    No medications on file     No discharge procedures on file.  ED SEPSIS DOCUMENTATION              Violetta Cedillo MD  06/01/25 1703     Violetta Cedillo MD  06/01/25 1708         [1]   Past Medical History:  Diagnosis Date    Diabetes mellitus (HCC)     Shingles    [2]   Past Surgical History:  Procedure Laterality Date    BACK SURGERY      KNEE SURGERY Left      SPINE SURGERY  1997    VENTRAL HERNIA REPAIR     [3]   Family History  Problem Relation Name Age of Onset    Diabetes Mother Amanda     Lung cancer Father Ankush     Skin cancer Father Ankush     Prostate cancer Father Aknush     Cancer Father Ankush     Colon cancer Neg Hx      Hypertension Neg Hx      Hyperlipidemia Neg Hx      Thyroid disease Neg Hx     [4]   Social History  Tobacco Use    Smoking status: Never     Passive exposure: Never    Smokeless tobacco: Never   Vaping Use    Vaping status: Never Used   Substance Use Topics    Alcohol use: No    Drug use: No        Violetta Cedillo MD  06/01/25 7695

## 2025-06-01 NOTE — ED ATTENDING ATTESTATION
6/1/2025  I, Kt Holguin MD, saw and evaluated the patient. I have discussed the patient with the resident/non-physician practitioner and agree with the resident's/non-physician practitioner's findings, Plan of Care, and MDM as documented in the resident's/non-physician practitioner's note, except where noted. All available labs and Radiology studies were reviewed.  I was present for key portions of any procedure(s) performed by the resident/non-physician practitioner and I was immediately available to provide assistance.       At this point I agree with the current assessment done in the Emergency Department.  I have conducted an independent evaluation of this patient a history and physical is as follows:    55-year-old right-handed male presented for evaluation of right wrist injury after trip and fall over a lift in his garage, landing on an outstretched right hand on his driveway.  Believes he struck the dorsal aspect of his wrist having few abrasions to the area.  He has a history of peripheral neuropathy and has minimal pain otherwise.  Denies head strike, any neck or back pain or any other injuries.  Last tetanus was within the last few years.  On exam he has an obvious deformity of the wrist.  X-ray done prior to my evaluation shows a comminuted displaced distal radial shaft fracture.  Ulna intact.  Plan finger traps for lengthening and will splint.  Patient declined hematoma block.    He has some difficulty with finger movement secondary to pain but no nerve palsy.    ED Course  ED Course as of 06/01/25 1748   Sun Jun 01, 2025   1742 Postreduction alignment improved.  Follow-up with orthopedics.         Critical Care Time  Orthopedic injury treatment    Date/Time: 6/1/2025 5:26 PM    Performed by: Kt Holguin MD  Authorized by: Kt Holguin MD    Patient Location:  ED    [place] Protocol:  Procedure performed by: (Dr. Cedillo, PGYI)  Consent: Verbal consent obtained  Consent given by:  patient  Patient identity confirmed: verbally with patient    Injury location:  Wrist  Location details:  Right wrist  Injury type:  Fracture  Fracture type: distal radius    Fracture type: distal radius    Neurovascular status: Neurovascularly intact    Distal perfusion: normal    Range of motion: normal    Local anesthesia used?: No    General anesthesia used?: No    Manipulation performed?: Yes    Skeletal traction used?: Yes (Finger traps, gravity + 10lbs)    Reduction successful?: Yes    Confirmation: Reduction confirmed by x-ray    Immobilization:  Splint  Splint type:  Sugar tong  Supplies used:  Cotton padding, Ortho-Glass and elastic bandage  Neurovascular status: Neurovascularly intact    Distal perfusion: normal    Neurological function: normal    Patient tolerance:  Patient tolerated the procedure well with no immediate complications

## 2025-06-04 ENCOUNTER — TELEPHONE (OUTPATIENT)
Dept: FAMILY MEDICINE CLINIC | Facility: CLINIC | Age: 55
End: 2025-06-04

## 2025-06-04 NOTE — TELEPHONE ENCOUNTER
We just started him on insulin last visit. Please obtain several of his recent fasting glucoses so I can adjust his dose.   Did he get the Dexcom? If so we would like to sync to our office.

## 2025-06-04 NOTE — TELEPHONE ENCOUNTER
----- Message from Shanice Mcadams MD sent at 5/22/2025  9:15 AM EDT -----  New insulin patient - call patient for glucose #'s / review Dexcom to make sure it's connected and make insulin adjustments.

## 2025-06-05 ENCOUNTER — OFFICE VISIT (OUTPATIENT)
Dept: OBGYN CLINIC | Facility: MEDICAL CENTER | Age: 55
End: 2025-06-05
Payer: COMMERCIAL

## 2025-06-05 VITALS — BODY MASS INDEX: 31.01 KG/M2 | RESPIRATION RATE: 18 BRPM | HEIGHT: 70 IN | WEIGHT: 216.6 LBS

## 2025-06-05 DIAGNOSIS — S52.501A CLOSED FRACTURE OF DISTAL END OF RIGHT RADIUS, UNSPECIFIED FRACTURE MORPHOLOGY, INITIAL ENCOUNTER: Primary | ICD-10-CM

## 2025-06-05 PROCEDURE — 99203 OFFICE O/P NEW LOW 30 MIN: CPT | Performed by: STUDENT IN AN ORGANIZED HEALTH CARE EDUCATION/TRAINING PROGRAM

## 2025-06-05 NOTE — TELEPHONE ENCOUNTER
Patient returned call and stated his fasting blood sugars have been under 200, except for [facility] Day and 2 days ago when he over did it.  His reading this morning was 180.  His current reading is 263, however he has eaten and has not taken his insulin yet.  He is using the Dexcom.

## 2025-06-05 NOTE — PROGRESS NOTES
ASSESSMENT/PLAN:    Assessment & Plan  Closed fracture of distal end of right radius, unspecified fracture morphology, initial encounter  Discussed history, exam, and imaging with patient. Presentation most consistent with right distal radial metadiaphysis fracture.  Referral placed to trauma surgeon Dr. Butler for further management and consideration of surgical intervention.  Discussed oral/topical medication regimen. Will plan for continued use of medications as prescribed by emergency department.  Discussed rehabilitation efforts.  Sugar-tong splint replaced at today's visit.  Plan for nonweightbearing right upper extremity in splint until follow-up with Dr. Butler.     Return with Dr. Butler.   _____________________________________________________  CHIEF COMPLAINT:  Chief Complaint   Patient presents with    Right Wrist - Pain     Patient tripped over [employer] on 6/1/25       SUBJECTIVE:  Ankush Richmond is a 55 y.o. year old male,  who presents for evaluation of right distal radius fracture. The issue began 6/1/25 when he fell while in walking outside his house.  He notes that he fell on outstretched right arm.  He had pain immediately to the right wrist and presented to the [place] emergency department.  He underwent closed reduction and splinting in the ED and was discharged with referral to orthopedics.  He notes that he has had mild to moderate pain in the wrist since the injury, but does note that he has neuropathy at baseline with decreased sensation in his bilateral upper extremities.  He notes that he had a spine injury many years ago, and underwent spine surgery.  Ever since surgery, he has had decrease sensation to his bilateral upper extremities.  He denies previous injury to the right wrist.  He notes that he was given a prescription for oxycodone by the emergency department, and he is mainly only been taking at nighttime to help sleep.  He denies any changes in sensation since the injury on  "6/1/2025.      PAST MEDICAL HISTORY:  Past Medical History[1]    PAST SURGICAL HISTORY:  Past Surgical History[2]    FAMILY HISTORY:  Family History[3]    SOCIAL HISTORY:  Social History[4]    MEDICATIONS:  Current Medications[5]    ALLERGIES:  Allergies[6]    Review of systems:   Constitutional: Negative for fatigue, fever or loss of apetite.   HENT: Negative.    Respiratory: Negative for shortness of breath, dyspnea.    Cardiovascular: Negative for chest pain/tightness.   Gastrointestinal: Negative for abdominal pain, N/V.   Endocrine: Negative for cold/heat intolerance, unexplained weight loss/gain.   Genitourinary: Negative for flank pain, dysuria, hematuria.   Musculoskeletal: As in HPI  Skin: Negative for rash.    Neurological: As in HPI  Psychiatric/Behavioral: Negative for agitation.  _____________________________________________________  PHYSICAL EXAMINATION:    Resp. rate 18, height 5' 10\" (1.778 m), weight 98.2 kg (216 lb 9.6 oz).    General: well developed, well nourished\", alert and oriented times 3, no acute distress  HEENT: Benign, normocephalic, atraumatic  Cardiovascular: regular rate    Pulmonary: No wheezing or stridor  Abdomen: Soft, Nontender  Skin: No open wounds, erythema, rash  Neurovascular: per examination below    MUSCULOSKELETAL EXAMINATION:    Right Wrist exam    Moderate bruising and swelling to forearm. No overt eformity  Small superficial abrasion to the ulnar aspect of the dorsal distal forearm, well-healing and without surrounding erythema  TTP about distal radius  Passive wrist and elbow range of motion deferred  Fires EPL, FPL, IO  Sensation intact to m/r/u nerves, with subjective decrease in sensation to all distributions which is his baseline since spine surgery many years ago, and is equal to that of his contralateral extremity   2+ radial pulse   _____________________________________________________  STUDIES REVIEWED:  I have personally reviewed pertinent films in PACS and my " interpretation is:     Xrays of the right wrist taken on 6/1/2025 demonstrate comminuted oblique fracture of the distal radial metadiaphysis with dorsal displacement of the distal fragment.  Postreduction x-rays of the right wrist taken 6/1/2025 demonstrate moderate improvement in alignment.  Small ulnar styloid process avulsion is also demonstrated.  No other acute fracture or dislocation is demonstrated.    Scribe Attestation      I,:  Shaun Carver PA-C am acting as a scribe while in the presence of the attending physician.:       I,:  Ajay Merida MD personally performed the services described in this documentation    as scribed in my presence.:                    [1]   Past Medical History:  Diagnosis Date    Diabetes mellitus (HCC)     Shingles    [2]   Past Surgical History:  Procedure Laterality Date    BACK SURGERY      KNEE SURGERY Left     SPINE SURGERY  1997    VENTRAL HERNIA REPAIR     [3]   Family History  Problem Relation Name Age of Onset    Diabetes Mother Amanda     Lung cancer Father Ankush     Skin cancer Father Ankush     Prostate cancer Father Ankush     Cancer Father Ankush     Colon cancer Neg Hx      Hypertension Neg Hx      Hyperlipidemia Neg Hx      Thyroid disease Neg Hx     [4]   Social History  Tobacco Use    Smoking status: Never     Passive exposure: Never    Smokeless tobacco: Never   Vaping Use    Vaping status: Never Used   Substance Use Topics    Alcohol use: No    Drug use: No   [5]   Current Outpatient Medications:     atorvastatin (LIPITOR) 40 mg tablet, Take 1 tablet (40 mg total) by mouth daily, Disp: 90 tablet, Rfl: 1    baclofen (LIORESAL) 10 MG/20ML, by Intrathecal route if needed, Disp: , Rfl:     baclofen 10 mg tablet, TAKE 1 TABLET BY MOUTH THREE TIMES A DAY, Disp: 270 tablet, Rfl: 1    Insulin Glargine Solostar (Lantus SoloStar) 100 UNIT/ML SOPN, Inject 0.1 mL (10 Units total) under the skin daily at bedtime, Disp: 9 mL, Rfl: 1    meloxicam (MOBIC) 15 mg tablet,  TAKE 1 TABLET BY MOUTH EVERY DAY, Disp: 30 tablet, Rfl: 2    oxyCODONE (Roxicodone) 5 immediate release tablet, Take 1 tablet (5 mg total) by mouth every 6 (six) hours as needed for moderate pain Max Daily Amount: 20 mg, Disp: 12 tablet, Rfl: 0    sertraline (ZOLOFT) 50 mg tablet, TAKE 1 TABLET BY MOUTH EVERY DAY, Disp: 90 tablet, Rfl: 1    zolpidem (AMBIEN) 10 mg tablet, Take 1 tablet (10 mg total) by mouth daily at bedtime as needed for sleep Do not start before May 28, 2025., Disp: 30 tablet, Rfl: 0    Blood Glucose Monitoring Suppl (OneTouch Verio Flex System) w/Device KIT, CHECK BLOOD SUGARS ONCE DAILY. (Patient not taking: Reported on 6/5/2025), Disp: 1 kit, Rfl: 0    Continuous Glucose Sensor (Dexcom G7 Sensor), Use 1 Device every 10 days (Patient not taking: Reported on 6/5/2025), Disp: 9 each, Rfl: 3    dapagliflozin (Farxiga) 10 MG tablet, Take 1 tablet (10 mg total) by mouth daily (Patient not taking: Reported on 6/5/2025), Disp: 90 tablet, Rfl: 1    glucose blood (OneTouch Verio) test strip, Check blood sugars once daily. Please substitute with appropriate alternative as covered by patient's insurance. Dx: E11.65 (Patient not taking: Reported on 6/5/2025), Disp: 100 each, Rfl: 3    Insulin Pen Needle (BD Pen Needle Tami U/F) 32G X 4 MM MISC, Use daily as directed with insulin pen (Patient not taking: Reported on 6/5/2025), Disp: 100 each, Rfl: 3    metFORMIN (GLUCOPHAGE) 1000 MG tablet, Take 1 tablet (1,000 mg total) by mouth 2 (two) times a day with meals (Patient not taking: Reported on 6/5/2025), Disp: 180 tablet, Rfl: 1    OneTouch Delica Lancets 33G MISC, Check blood sugars once daily. Please substitute with appropriate alternative as covered by patient's insurance. Dx: E11.65 (Patient not taking: Reported on 6/5/2025), Disp: 100 each, Rfl: 3  [6]   Allergies  Allergen Reactions    Gadolinium Derivatives Itching     Media Ready-Box (Contrast Dye)     Ozempic (0.25 Or 0.5 Mg-Dose) [Semaglutide(0.25  Or 0.5mg-Dos)] Headache    Trulicity [Dulaglutide] Dizziness

## 2025-06-05 NOTE — TELEPHONE ENCOUNTER
Please return his call  - Increase Lantus to 14u HS  - Have him connect the Dexcom to our office using our office code: [email or web address]

## 2025-06-06 ENCOUNTER — TELEPHONE (OUTPATIENT)
Dept: FAMILY MEDICINE CLINIC | Facility: CLINIC | Age: 55
End: 2025-06-06

## 2025-06-06 NOTE — TELEPHONE ENCOUNTER
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   It is only recommended to use narcotic medication for 2-3 days following an acute fracture. If he were to continue to use narcotic medication, it could make his post-op recovery worse (if he needs surgery, as I know he has consultation next week). Recommend ICE, tylenol, motrin.

## 2025-06-06 NOTE — TELEPHONE ENCOUNTER
Patient called the RX Refill Line. Message is being forwarded to the office.     Patient is requesting a refill of Hydrocodone - Acetaminophen 5-325 mg be sent to [facility]/pharmacy #[id number] - [place] - [address].  Patient broke his arm a couple days ago and needs something to help with the pain.     Please contact patient at [phone]

## 2025-06-07 DIAGNOSIS — S52.501A CLOSED FRACTURE OF DISTAL END OF RIGHT RADIUS, UNSPECIFIED FRACTURE MORPHOLOGY, INITIAL ENCOUNTER: Primary | ICD-10-CM

## 2025-06-10 ENCOUNTER — OFFICE VISIT (OUTPATIENT)
Dept: OBGYN CLINIC | Facility: CLINIC | Age: 55
End: 2025-06-10
Payer: COMMERCIAL

## 2025-06-10 ENCOUNTER — OFFICE VISIT (OUTPATIENT)
Dept: LAB | Facility: HOSPITAL | Age: 55
End: 2025-06-10
Payer: COMMERCIAL

## 2025-06-10 ENCOUNTER — APPOINTMENT (OUTPATIENT)
Dept: RADIOLOGY | Facility: CLINIC | Age: 55
End: 2025-06-10
Attending: ORTHOPAEDIC SURGERY
Payer: COMMERCIAL

## 2025-06-10 VITALS — BODY MASS INDEX: 30.92 KG/M2 | WEIGHT: 216 LBS | HEIGHT: 70 IN

## 2025-06-10 DIAGNOSIS — S52.501A CLOSED FRACTURE OF DISTAL END OF RIGHT RADIUS, UNSPECIFIED FRACTURE MORPHOLOGY, INITIAL ENCOUNTER: ICD-10-CM

## 2025-06-10 DIAGNOSIS — S52.391A OTHER CLOSED FRACTURE OF SHAFT OF RIGHT RADIUS, INITIAL ENCOUNTER: Primary | ICD-10-CM

## 2025-06-10 DIAGNOSIS — S52.591A OTHER CLOSED FRACTURE OF DISTAL END OF RIGHT RADIUS, INITIAL ENCOUNTER: ICD-10-CM

## 2025-06-10 PROBLEM — S52.301A CLOSED FRACTURE OF SHAFT OF RIGHT RADIUS: Status: ACTIVE | Noted: 2025-06-10

## 2025-06-10 PROCEDURE — 73110 X-RAY EXAM OF WRIST: CPT

## 2025-06-10 PROCEDURE — 73090 X-RAY EXAM OF FOREARM: CPT

## 2025-06-10 PROCEDURE — 93005 ELECTROCARDIOGRAM TRACING: CPT

## 2025-06-10 PROCEDURE — 99214 OFFICE O/P EST MOD 30 MIN: CPT | Performed by: ORTHOPAEDIC SURGERY

## 2025-06-10 RX ORDER — SODIUM CHLORIDE, SODIUM LACTATE, POTASSIUM CHLORIDE, CALCIUM CHLORIDE 600; 310; 30; 20 MG/100ML; MG/100ML; MG/100ML; MG/100ML
20 INJECTION, SOLUTION INTRAVENOUS CONTINUOUS
Status: CANCELLED | OUTPATIENT
Start: 2025-06-10

## 2025-06-10 RX ORDER — CHLORHEXIDINE GLUCONATE ORAL RINSE 1.2 MG/ML
15 SOLUTION DENTAL ONCE
Status: CANCELLED | OUTPATIENT
Start: 2025-06-10 | End: 2025-06-10

## 2025-06-10 NOTE — PROGRESS NOTES
Name: Ankush Richmond      : 1970       MRN: [id number]   Encounter Provider: Enrique Butler MD   Encounter Date: 06/10/25  Encounter department: [facility] [place]         Assessment & Plan  Other closed fracture of distal end of right radius, initial encounter    Orders:    ECG 12 lead; Future    Other closed fracture of shaft of right radius, initial encounter  Xrays obtained and reviewed; pt has radial shaft fracture with progressive shortening and worsening coronal angulation from previous films, dominant extremity  Treatment options discussed including operative and nonoperative management. Risks and benefits of each discussed in detail  Operative management was discussed in the form of right distal radius fracture ORIF. risks of surgery, including but not limited to, infection, nerve and blood vessel injury, postoperative stiffness, persistent pain, malunion, nonunion, DVT/PE, impaired limb function, loss of limb, symptomatic hardware, tendon ruptureanesthesia complications, and need for subsequent surgery were discussed in detail.  Patient understands that he is at increased risk of healing complications due to DM most recent HbA1C of 9.7 on 2025 which includeb ut are not limited to infection, wound healing complications, impaired limb function, loss of limb  He was resplinted in the office today  Advised patient he should not be driving at this time.  Plan to go to OR tomorrow for ORIF, NPO after midnight  Follow up post operatively  Orders:    Case request operating room: OPEN REDUCTION W/ INTERNAL FIXATION (ORIF) RIGHT RADIAL SHAFT FRACTURE; Standing         To Do Next Visit:  Post op 1    _____________________________________________________  CHIEF COMPLAINT:  Chief Complaint   Patient presents with    Right Wrist - Pain         SUBJECTIVE:  Ankush Richmond is a 55 y.o. male who presents for evaluation of right wrist pain. The patient was seen in the ED on  "6/1/2025 after a fall. Xrays were performed showing distal radius fracture. He saw Dr. Merida on 6/5/25 who placed the patient in sugar tong splint and referred the patient to me. Patient is RHD. Patient does not work. He lives at home with his fiance.     PAST MEDICAL HISTORY:  Past Medical History[1]    PAST SURGICAL HISTORY:  Past Surgical History[2]    FAMILY HISTORY:  Family History[3]    SOCIAL HISTORY:  Social History[4]    MEDICATIONS:  Current Medications[5]    ALLERGIES:  Allergies[6]    LABS:  HgA1c:   Lab Results   Component Value Date    HGBA1C 9.6 (H) 05/21/2025     BMP:   Lab Results   Component Value Date    GLUCOSE 180 (H) 02/12/2016    CALCIUM 10.2 05/21/2025     11/03/2014    K 5.0 05/21/2025    CO2 32 05/21/2025    CL 99 05/21/2025    BUN 19 05/21/2025    CREATININE 0.95 05/21/2025     CBC: No components found for: \"CBC\"    _____________________________________________________  PHYSICAL EXAMINATION:  Vital signs: Ht 5' 10\" (1.778 m)   Wt 98 kg (216 lb)   BMI 30.99 kg/m²   General: No acute distress, awake and alert  Psychiatric: Mood and affect appear appropriate  HEENT: Trachea Midline, No torticollis, no apparent facial trauma  Cardiovascular: No audible murmurs; Extremities appear perfused  Pulmonary: No audible wheezing or stridor  Skin: No open lesions; see further details (if any) below    MUSCULOSKELETAL EXAMINATION:  Extremities:    Right wrist  Distal radius mild tender to palpation.   Range of motion of the right wrist  is limited in all planes  There is moderate swelling present.   There is no ecchymosis noted.    Pulses are present and capillary fill is normal.   Skin abrasion ulnar side of wrist.  Globally decreased sensation to touch all distal nerve distributions          _____________________________________________________  STUDIES REVIEWED:  I personally reviewed the images obtained in office today and my independent interpretation is as follows:  Xrays right wrist " obtained 6/1/25 demonstrate displaced distal radial shaft fracture, worsening alignment and shortening from previous study      PROCEDURES PERFORMED:  Procedures      Scribe Attestation      I,:  Namei Romo am acting as a scribe while in the presence of the attending physician.:       I,:  Enrique Butler MD personally performed the services described in this documentation    as scribed in my presence.:                  [1]   Past Medical History:  Diagnosis Date    Diabetes mellitus (HCC)     Shingles    [2]   Past Surgical History:  Procedure Laterality Date    BACK SURGERY      KNEE SURGERY Left     SPINE SURGERY  1997    VENTRAL HERNIA REPAIR     [3]   Family History  Problem Relation Name Age of Onset    Diabetes Mother Amanda     Lung cancer Father Ankush     Skin cancer Father Ankush     Prostate cancer Father Ankush     Cancer Father Ankush     Colon cancer Neg Hx      Hypertension Neg Hx      Hyperlipidemia Neg Hx      Thyroid disease Neg Hx     [4]   Social History  Tobacco Use    Smoking status: Never     Passive exposure: Never    Smokeless tobacco: Never   Vaping Use    Vaping status: Never Used   Substance Use Topics    Alcohol use: No    Drug use: No   [5]   Current Outpatient Medications:     atorvastatin (LIPITOR) 40 mg tablet, Take 1 tablet (40 mg total) by mouth daily, Disp: 90 tablet, Rfl: 1    baclofen (LIORESAL) 10 MG/20ML, by Intrathecal route if needed, Disp: , Rfl:     baclofen 10 mg tablet, TAKE 1 TABLET BY MOUTH THREE TIMES A DAY, Disp: 270 tablet, Rfl: 1    Insulin Glargine Solostar (Lantus SoloStar) 100 UNIT/ML SOPN, Inject 0.1 mL (10 Units total) under the skin daily at bedtime, Disp: 9 mL, Rfl: 1    meloxicam (MOBIC) 15 mg tablet, TAKE 1 TABLET BY MOUTH EVERY DAY, Disp: 30 tablet, Rfl: 2    oxyCODONE (Roxicodone) 5 immediate release tablet, Take 1 tablet (5 mg total) by mouth every 6 (six) hours as needed for moderate pain Max Daily Amount: 20 mg, Disp: 12 tablet, Rfl: 0     sertraline (ZOLOFT) 50 mg tablet, TAKE 1 TABLET BY MOUTH EVERY DAY, Disp: 90 tablet, Rfl: 1    zolpidem (AMBIEN) 10 mg tablet, Take 1 tablet (10 mg total) by mouth daily at bedtime as needed for sleep Do not start before May 28, 2025., Disp: 30 tablet, Rfl: 0    Blood Glucose Monitoring Suppl (OneTouch Verio Flex System) w/Device KIT, CHECK BLOOD SUGARS ONCE DAILY. (Patient not taking: Reported on 6/5/2025), Disp: 1 kit, Rfl: 0    Continuous Glucose Sensor (Dexcom G7 Sensor), Use 1 Device every 10 days (Patient not taking: Reported on 6/5/2025), Disp: 9 each, Rfl: 3    dapagliflozin (Farxiga) 10 MG tablet, Take 1 tablet (10 mg total) by mouth daily (Patient not taking: Reported on 6/5/2025), Disp: 90 tablet, Rfl: 1    glucose blood (OneTouch Verio) test strip, Check blood sugars once daily. Please substitute with appropriate alternative as covered by patient's insurance. Dx: E11.65 (Patient not taking: Reported on 6/5/2025), Disp: 100 each, Rfl: 3    Insulin Pen Needle (BD Pen Needle Tami U/F) 32G X 4 MM MISC, Use daily as directed with insulin pen (Patient not taking: Reported on 6/5/2025), Disp: 100 each, Rfl: 3    metFORMIN (GLUCOPHAGE) 1000 MG tablet, Take 1 tablet (1,000 mg total) by mouth 2 (two) times a day with meals (Patient not taking: Reported on 6/5/2025), Disp: 180 tablet, Rfl: 1    OneTouch Delica Lancets 33G MISC, Check blood sugars once daily. Please substitute with appropriate alternative as covered by patient's insurance. Dx: E11.65 (Patient not taking: Reported on 6/5/2025), Disp: 100 each, Rfl: 3  [6]   Allergies  Allergen Reactions    Gadolinium Derivatives Itching     Media Ready-Box (Contrast Dye)     Ozempic (0.25 Or 0.5 Mg-Dose) [Semaglutide(0.25 Or 0.5mg-Dos)] Headache    Trulicity [Dulaglutide] Dizziness

## 2025-06-10 NOTE — H&P (VIEW-ONLY)
Name: Ankush Richmond      : 1970       MRN: [id number]   Encounter Provider: Enrique Butler MD   Encounter Date: 06/10/25  Encounter department: [facility] [place]         Assessment & Plan  Other closed fracture of distal end of right radius, initial encounter    Orders:    ECG 12 lead; Future    Other closed fracture of shaft of right radius, initial encounter  Xrays obtained and reviewed; pt has radial shaft fracture with progressive shortening and worsening coronal angulation from previous films, dominant extremity  Treatment options discussed including operative and nonoperative management. Risks and benefits of each discussed in detail  Operative management was discussed in the form of right distal radius fracture ORIF. risks of surgery, including but not limited to, infection, nerve and blood vessel injury, postoperative stiffness, persistent pain, malunion, nonunion, DVT/PE, impaired limb function, loss of limb, symptomatic hardware, tendon ruptureanesthesia complications, and need for subsequent surgery were discussed in detail.  Patient understands that he is at increased risk of healing complications due to DM most recent HbA1C of 9.7 on 2025 which includeb ut are not limited to infection, wound healing complications, impaired limb function, loss of limb  He was resplinted in the office today  Advised patient he should not be driving at this time.  Plan to go to OR tomorrow for ORIF, NPO after midnight  Follow up post operatively  Orders:    Case request operating room: OPEN REDUCTION W/ INTERNAL FIXATION (ORIF) RIGHT RADIAL SHAFT FRACTURE; Standing         To Do Next Visit:  Post op 1    _____________________________________________________  CHIEF COMPLAINT:  Chief Complaint   Patient presents with    Right Wrist - Pain         SUBJECTIVE:  Ankush Richmond is a 55 y.o. male who presents for evaluation of right wrist pain. The patient was seen in the ED on  "6/1/2025 after a fall. Xrays were performed showing distal radius fracture. He saw Dr. Merida on 6/5/25 who placed the patient in sugar tong splint and referred the patient to me. Patient is RHD. Patient does not work. He lives at home with his fiance.     PAST MEDICAL HISTORY:  Past Medical History[1]    PAST SURGICAL HISTORY:  Past Surgical History[2]    FAMILY HISTORY:  Family History[3]    SOCIAL HISTORY:  Social History[4]    MEDICATIONS:  Current Medications[5]    ALLERGIES:  Allergies[6]    LABS:  HgA1c:   Lab Results   Component Value Date    HGBA1C 9.6 (H) 05/21/2025     BMP:   Lab Results   Component Value Date    GLUCOSE 180 (H) 02/12/2016    CALCIUM 10.2 05/21/2025     11/03/2014    K 5.0 05/21/2025    CO2 32 05/21/2025    CL 99 05/21/2025    BUN 19 05/21/2025    CREATININE 0.95 05/21/2025     CBC: No components found for: \"CBC\"    _____________________________________________________  PHYSICAL EXAMINATION:  Vital signs: Ht 5' 10\" (1.778 m)   Wt 98 kg (216 lb)   BMI 30.99 kg/m²   General: No acute distress, awake and alert  Psychiatric: Mood and affect appear appropriate  HEENT: Trachea Midline, No torticollis, no apparent facial trauma  Cardiovascular: No audible murmurs; Extremities appear perfused  Pulmonary: No audible wheezing or stridor  Skin: No open lesions; see further details (if any) below    MUSCULOSKELETAL EXAMINATION:  Extremities:    Right wrist  Distal radius mild tender to palpation.   Range of motion of the right wrist  is limited in all planes  There is moderate swelling present.   There is no ecchymosis noted.    Pulses are present and capillary fill is normal.   Skin abrasion ulnar side of wrist.  Globally decreased sensation to touch all distal nerve distributions          _____________________________________________________  STUDIES REVIEWED:  I personally reviewed the images obtained in office today and my independent interpretation is as follows:  Xrays right wrist " obtained 6/1/25 demonstrate displaced distal radial shaft fracture, worsening alignment and shortening from previous study      PROCEDURES PERFORMED:  Procedures      Scribe Attestation      I,:  Namei Romo am acting as a scribe while in the presence of the attending physician.:       I,:  Enrique Butler MD personally performed the services described in this documentation    as scribed in my presence.:                  [1]   Past Medical History:  Diagnosis Date    Diabetes mellitus (HCC)     Shingles    [2]   Past Surgical History:  Procedure Laterality Date    BACK SURGERY      KNEE SURGERY Left     SPINE SURGERY  1997    VENTRAL HERNIA REPAIR     [3]   Family History  Problem Relation Name Age of Onset    Diabetes Mother Amanda     Lung cancer Father Ankush     Skin cancer Father Ankush     Prostate cancer Father Ankush     Cancer Father Ankush     Colon cancer Neg Hx      Hypertension Neg Hx      Hyperlipidemia Neg Hx      Thyroid disease Neg Hx     [4]   Social History  Tobacco Use    Smoking status: Never     Passive exposure: Never    Smokeless tobacco: Never   Vaping Use    Vaping status: Never Used   Substance Use Topics    Alcohol use: No    Drug use: No   [5]   Current Outpatient Medications:     atorvastatin (LIPITOR) 40 mg tablet, Take 1 tablet (40 mg total) by mouth daily, Disp: 90 tablet, Rfl: 1    baclofen (LIORESAL) 10 MG/20ML, by Intrathecal route if needed, Disp: , Rfl:     baclofen 10 mg tablet, TAKE 1 TABLET BY MOUTH THREE TIMES A DAY, Disp: 270 tablet, Rfl: 1    Insulin Glargine Solostar (Lantus SoloStar) 100 UNIT/ML SOPN, Inject 0.1 mL (10 Units total) under the skin daily at bedtime, Disp: 9 mL, Rfl: 1    meloxicam (MOBIC) 15 mg tablet, TAKE 1 TABLET BY MOUTH EVERY DAY, Disp: 30 tablet, Rfl: 2    oxyCODONE (Roxicodone) 5 immediate release tablet, Take 1 tablet (5 mg total) by mouth every 6 (six) hours as needed for moderate pain Max Daily Amount: 20 mg, Disp: 12 tablet, Rfl: 0     sertraline (ZOLOFT) 50 mg tablet, TAKE 1 TABLET BY MOUTH EVERY DAY, Disp: 90 tablet, Rfl: 1    zolpidem (AMBIEN) 10 mg tablet, Take 1 tablet (10 mg total) by mouth daily at bedtime as needed for sleep Do not start before May 28, 2025., Disp: 30 tablet, Rfl: 0    Blood Glucose Monitoring Suppl (OneTouch Verio Flex System) w/Device KIT, CHECK BLOOD SUGARS ONCE DAILY. (Patient not taking: Reported on 6/5/2025), Disp: 1 kit, Rfl: 0    Continuous Glucose Sensor (Dexcom G7 Sensor), Use 1 Device every 10 days (Patient not taking: Reported on 6/5/2025), Disp: 9 each, Rfl: 3    dapagliflozin (Farxiga) 10 MG tablet, Take 1 tablet (10 mg total) by mouth daily (Patient not taking: Reported on 6/5/2025), Disp: 90 tablet, Rfl: 1    glucose blood (OneTouch Verio) test strip, Check blood sugars once daily. Please substitute with appropriate alternative as covered by patient's insurance. Dx: E11.65 (Patient not taking: Reported on 6/5/2025), Disp: 100 each, Rfl: 3    Insulin Pen Needle (BD Pen Needle Tami U/F) 32G X 4 MM MISC, Use daily as directed with insulin pen (Patient not taking: Reported on 6/5/2025), Disp: 100 each, Rfl: 3    metFORMIN (GLUCOPHAGE) 1000 MG tablet, Take 1 tablet (1,000 mg total) by mouth 2 (two) times a day with meals (Patient not taking: Reported on 6/5/2025), Disp: 180 tablet, Rfl: 1    OneTouch Delica Lancets 33G MISC, Check blood sugars once daily. Please substitute with appropriate alternative as covered by patient's insurance. Dx: E11.65 (Patient not taking: Reported on 6/5/2025), Disp: 100 each, Rfl: 3  [6]   Allergies  Allergen Reactions    Gadolinium Derivatives Itching     Media Ready-Box (Contrast Dye)     Ozempic (0.25 Or 0.5 Mg-Dose) [Semaglutide(0.25 Or 0.5mg-Dos)] Headache    Trulicity [Dulaglutide] Dizziness

## 2025-06-11 ENCOUNTER — HOSPITAL ENCOUNTER (OUTPATIENT)
Dept: RADIOLOGY | Facility: HOSPITAL | Age: 55
Setting detail: OUTPATIENT SURGERY
Discharge: HOME | End: 2025-06-11
Payer: COMMERCIAL

## 2025-06-11 ENCOUNTER — ANESTHESIA (OUTPATIENT)
Dept: PERIOP | Facility: HOSPITAL | Age: 55
End: 2025-06-11
Payer: COMMERCIAL

## 2025-06-11 ENCOUNTER — ANESTHESIA EVENT (OUTPATIENT)
Dept: PERIOP | Facility: HOSPITAL | Age: 55
End: 2025-06-11
Payer: COMMERCIAL

## 2025-06-11 ENCOUNTER — HOSPITAL ENCOUNTER (OUTPATIENT)
Facility: HOSPITAL | Age: 55
Setting detail: OUTPATIENT SURGERY
Discharge: HOME | End: 2025-06-11
Attending: ORTHOPAEDIC SURGERY | Admitting: ORTHOPAEDIC SURGERY
Payer: COMMERCIAL

## 2025-06-11 VITALS
SYSTOLIC BLOOD PRESSURE: 133 MMHG | DIASTOLIC BLOOD PRESSURE: 74 MMHG | BODY MASS INDEX: 30.92 KG/M2 | WEIGHT: 216 LBS | HEART RATE: 89 BPM | OXYGEN SATURATION: 97 % | HEIGHT: 70 IN | TEMPERATURE: 96.8 F | RESPIRATION RATE: 16 BRPM

## 2025-06-11 DIAGNOSIS — S52.391A OTHER CLOSED FRACTURE OF SHAFT OF RIGHT RADIUS, INITIAL ENCOUNTER: Primary | ICD-10-CM

## 2025-06-11 DIAGNOSIS — S52.391A OTHER CLOSED FRACTURE OF SHAFT OF RIGHT RADIUS, INITIAL ENCOUNTER: ICD-10-CM

## 2025-06-11 LAB
GLUCOSE SERPL-MCNC: 130 MG/DL (ref 65–140)
GLUCOSE SERPL-MCNC: 158 MG/DL (ref 65–140)

## 2025-06-11 PROCEDURE — C1713 ANCHOR/SCREW BN/BN,TIS/BN: HCPCS | Performed by: ORTHOPAEDIC SURGERY

## 2025-06-11 PROCEDURE — 25515 OPTX RADIAL SHAFT FRACTURE: CPT | Performed by: ORTHOPAEDIC SURGERY

## 2025-06-11 PROCEDURE — 82948 REAGENT STRIP/BLOOD GLUCOSE: CPT

## 2025-06-11 PROCEDURE — 73100 X-RAY EXAM OF WRIST: CPT

## 2025-06-11 DEVICE — 3.5MM CORTEX SCREW SELF-TAPPING 16MM: Type: IMPLANTABLE DEVICE | Site: WRIST | Status: FUNCTIONAL

## 2025-06-11 DEVICE — 2.4MM LOCKING SCREW SLF-TPNG WITH STARDRIVE RECESS 16MM: Type: IMPLANTABLE DEVICE | Site: WRIST | Status: FUNCTIONAL

## 2025-06-11 DEVICE — 2.4MM CORTEX SCREW SLF-TPNG WITH T8 STARDRIVE RECESS 20MM: Type: IMPLANTABLE DEVICE | Site: WRIST | Status: FUNCTIONAL

## 2025-06-11 DEVICE — LCP DIA-META VOLAR DISTAL RADIUS PL 7H SHAFT/RT
Type: IMPLANTABLE DEVICE | Site: WRIST | Status: FUNCTIONAL
Brand: LCP

## 2025-06-11 DEVICE — 3.5MM CORTEX SCREW SELF-TAPPING 18MM: Type: IMPLANTABLE DEVICE | Site: WRIST | Status: FUNCTIONAL

## 2025-06-11 RX ORDER — PROPOFOL 10 MG/ML
INJECTION, EMULSION INTRAVENOUS AS NEEDED
Status: DISCONTINUED | OUTPATIENT
Start: 2025-06-11 | End: 2025-06-11

## 2025-06-11 RX ORDER — CHLORHEXIDINE GLUCONATE ORAL RINSE 1.2 MG/ML
15 SOLUTION DENTAL ONCE
Status: DISCONTINUED | OUTPATIENT
Start: 2025-06-11 | End: 2025-06-11

## 2025-06-11 RX ORDER — OXYCODONE HYDROCHLORIDE 5 MG/1
5 TABLET ORAL EVERY 6 HOURS PRN
Status: DISCONTINUED | OUTPATIENT
Start: 2025-06-11 | End: 2025-06-11 | Stop reason: HOSPADM

## 2025-06-11 RX ORDER — SODIUM CHLORIDE, SODIUM LACTATE, POTASSIUM CHLORIDE, CALCIUM CHLORIDE 600; 310; 30; 20 MG/100ML; MG/100ML; MG/100ML; MG/100ML
100 INJECTION, SOLUTION INTRAVENOUS CONTINUOUS
Status: DISCONTINUED | OUTPATIENT
Start: 2025-06-11 | End: 2025-06-11 | Stop reason: HOSPADM

## 2025-06-11 RX ORDER — ONDANSETRON 2 MG/ML
4 INJECTION INTRAMUSCULAR; INTRAVENOUS EVERY 6 HOURS PRN
Status: DISCONTINUED | OUTPATIENT
Start: 2025-06-11 | End: 2025-06-11 | Stop reason: HOSPADM

## 2025-06-11 RX ORDER — CEFAZOLIN SODIUM 2 G/50ML
2000 SOLUTION INTRAVENOUS ONCE
Status: COMPLETED | OUTPATIENT
Start: 2025-06-11 | End: 2025-06-11

## 2025-06-11 RX ORDER — HYDROMORPHONE HCL/PF 1 MG/ML
0.5 SYRINGE (ML) INJECTION
Status: DISCONTINUED | OUTPATIENT
Start: 2025-06-11 | End: 2025-06-11 | Stop reason: HOSPADM

## 2025-06-11 RX ORDER — MAGNESIUM HYDROXIDE 1200 MG/15ML
LIQUID ORAL AS NEEDED
Status: DISCONTINUED | OUTPATIENT
Start: 2025-06-11 | End: 2025-06-11 | Stop reason: HOSPADM

## 2025-06-11 RX ORDER — PROMETHAZINE HYDROCHLORIDE 25 MG/ML
25 INJECTION, SOLUTION INTRAMUSCULAR; INTRAVENOUS ONCE AS NEEDED
Status: DISCONTINUED | OUTPATIENT
Start: 2025-06-11 | End: 2025-06-11 | Stop reason: HOSPADM

## 2025-06-11 RX ORDER — DOCUSATE SODIUM 100 MG/1
100 CAPSULE, LIQUID FILLED ORAL 2 TIMES DAILY
Qty: 30 CAPSULE | Refills: 0 | Status: SHIPPED | OUTPATIENT
Start: 2025-06-11

## 2025-06-11 RX ORDER — FENTANYL CITRATE 50 UG/ML
INJECTION, SOLUTION INTRAMUSCULAR; INTRAVENOUS AS NEEDED
Status: DISCONTINUED | OUTPATIENT
Start: 2025-06-11 | End: 2025-06-11

## 2025-06-11 RX ORDER — OXYCODONE HYDROCHLORIDE 5 MG/1
5 TABLET ORAL EVERY 6 HOURS PRN
Qty: 30 TABLET | Refills: 0 | Status: SHIPPED | OUTPATIENT
Start: 2025-06-11 | End: 2025-06-21

## 2025-06-11 RX ORDER — FENTANYL CITRATE/PF 50 MCG/ML
50 SYRINGE (ML) INJECTION
Status: DISCONTINUED | OUTPATIENT
Start: 2025-06-11 | End: 2025-06-11 | Stop reason: HOSPADM

## 2025-06-11 RX ORDER — ONDANSETRON 2 MG/ML
4 INJECTION INTRAMUSCULAR; INTRAVENOUS ONCE AS NEEDED
Status: DISCONTINUED | OUTPATIENT
Start: 2025-06-11 | End: 2025-06-11 | Stop reason: HOSPADM

## 2025-06-11 RX ORDER — SODIUM CHLORIDE, SODIUM LACTATE, POTASSIUM CHLORIDE, CALCIUM CHLORIDE 600; 310; 30; 20 MG/100ML; MG/100ML; MG/100ML; MG/100ML
20 INJECTION, SOLUTION INTRAVENOUS CONTINUOUS
Status: DISCONTINUED | OUTPATIENT
Start: 2025-06-11 | End: 2025-06-11 | Stop reason: HOSPADM

## 2025-06-11 RX ORDER — SODIUM CHLORIDE, SODIUM LACTATE, POTASSIUM CHLORIDE, CALCIUM CHLORIDE 600; 310; 30; 20 MG/100ML; MG/100ML; MG/100ML; MG/100ML
INJECTION, SOLUTION INTRAVENOUS CONTINUOUS PRN
Status: DISCONTINUED | OUTPATIENT
Start: 2025-06-11 | End: 2025-06-11

## 2025-06-11 RX ORDER — ONDANSETRON 2 MG/ML
INJECTION INTRAMUSCULAR; INTRAVENOUS AS NEEDED
Status: DISCONTINUED | OUTPATIENT
Start: 2025-06-11 | End: 2025-06-11

## 2025-06-11 RX ORDER — ROCURONIUM BROMIDE 10 MG/ML
INJECTION, SOLUTION INTRAVENOUS AS NEEDED
Status: DISCONTINUED | OUTPATIENT
Start: 2025-06-11 | End: 2025-06-11

## 2025-06-11 RX ORDER — MIDAZOLAM HYDROCHLORIDE 2 MG/2ML
INJECTION, SOLUTION INTRAMUSCULAR; INTRAVENOUS AS NEEDED
Status: DISCONTINUED | OUTPATIENT
Start: 2025-06-11 | End: 2025-06-11

## 2025-06-11 RX ORDER — LIDOCAINE HYDROCHLORIDE 10 MG/ML
INJECTION, SOLUTION EPIDURAL; INFILTRATION; INTRACAUDAL; PERINEURAL AS NEEDED
Status: DISCONTINUED | OUTPATIENT
Start: 2025-06-11 | End: 2025-06-11

## 2025-06-11 RX ORDER — DEXAMETHASONE SODIUM PHOSPHATE 10 MG/ML
INJECTION, SOLUTION INTRAMUSCULAR; INTRAVENOUS AS NEEDED
Status: DISCONTINUED | OUTPATIENT
Start: 2025-06-11 | End: 2025-06-11

## 2025-06-11 RX ORDER — ACETAMINOPHEN 325 MG/1
650 TABLET ORAL EVERY 6 HOURS PRN
Status: DISCONTINUED | OUTPATIENT
Start: 2025-06-11 | End: 2025-06-11 | Stop reason: HOSPADM

## 2025-06-11 RX ORDER — BUPIVACAINE HYDROCHLORIDE 2.5 MG/ML
INJECTION, SOLUTION EPIDURAL; INFILTRATION; INTRACAUDAL; PERINEURAL AS NEEDED
Status: DISCONTINUED | OUTPATIENT
Start: 2025-06-11 | End: 2025-06-11 | Stop reason: HOSPADM

## 2025-06-11 RX ADMIN — LIDOCAINE HYDROCHLORIDE 50 MG: 10 INJECTION, SOLUTION EPIDURAL; INFILTRATION; INTRACAUDAL; PERINEURAL at 13:24

## 2025-06-11 RX ADMIN — PROPOFOL 160 MG: 10 INJECTION, EMULSION INTRAVENOUS at 13:27

## 2025-06-11 RX ADMIN — OXYCODONE HYDROCHLORIDE 5 MG: 5 TABLET ORAL at 16:43

## 2025-06-11 RX ADMIN — MIDAZOLAM 2 MG: 1 INJECTION INTRAMUSCULAR; INTRAVENOUS at 13:19

## 2025-06-11 RX ADMIN — ROCURONIUM BROMIDE 20 MG: 10 INJECTION, SOLUTION INTRAVENOUS at 14:16

## 2025-06-11 RX ADMIN — SUGAMMADEX 200 MG: 100 INJECTION, SOLUTION INTRAVENOUS at 15:28

## 2025-06-11 RX ADMIN — SUGAMMADEX 200 MG: 100 INJECTION, SOLUTION INTRAVENOUS at 15:21

## 2025-06-11 RX ADMIN — CEFAZOLIN SODIUM 2000 MG: 2 SOLUTION INTRAVENOUS at 13:27

## 2025-06-11 RX ADMIN — DEXAMETHASONE SODIUM PHOSPHATE 10 MG: 10 INJECTION, SOLUTION INTRAMUSCULAR; INTRAVENOUS at 13:27

## 2025-06-11 RX ADMIN — SODIUM CHLORIDE, SODIUM LACTATE, POTASSIUM CHLORIDE, AND CALCIUM CHLORIDE 20 ML/HR: .6; .31; .03; .02 INJECTION, SOLUTION INTRAVENOUS at 13:15

## 2025-06-11 RX ADMIN — ROCURONIUM BROMIDE 50 MG: 10 INJECTION, SOLUTION INTRAVENOUS at 13:27

## 2025-06-11 RX ADMIN — ONDANSETRON 4 MG: 2 INJECTION INTRAMUSCULAR; INTRAVENOUS at 13:30

## 2025-06-11 RX ADMIN — SODIUM CHLORIDE, SODIUM LACTATE, POTASSIUM CHLORIDE, AND CALCIUM CHLORIDE: .6; .31; .03; .02 INJECTION, SOLUTION INTRAVENOUS at 15:23

## 2025-06-11 RX ADMIN — FENTANYL CITRATE 50 MCG: 50 INJECTION INTRAMUSCULAR; INTRAVENOUS at 14:20

## 2025-06-11 RX ADMIN — SODIUM CHLORIDE, SODIUM LACTATE, POTASSIUM CHLORIDE, AND CALCIUM CHLORIDE: .6; .31; .03; .02 INJECTION, SOLUTION INTRAVENOUS at 13:21

## 2025-06-11 RX ADMIN — FENTANYL CITRATE 50 MCG: 50 INJECTION INTRAMUSCULAR; INTRAVENOUS at 15:46

## 2025-06-11 RX ADMIN — PHENYLEPHRINE HYDROCHLORIDE 30 MCG/MIN: 50 INJECTION INTRAVENOUS at 13:39

## 2025-06-11 RX ADMIN — FENTANYL CITRATE 50 MCG: 50 INJECTION INTRAMUSCULAR; INTRAVENOUS at 13:24

## 2025-06-11 NOTE — INTERVAL H&P NOTE
H&P reviewed. After examining the patient I find no changes in the patients condition since the H&P had been written.    Vitals:    06/11/25 1243   BP: 114/74   Pulse: 82   Resp: 18   Temp: (!) 96.9 °F (36.1 °C)   SpO2: 95%

## 2025-06-11 NOTE — ANESTHESIA PREPROCEDURE EVALUATION
Procedure:  OPEN REDUCTION W/ INTERNAL FIXATION (ORIF) RIGHT RADIAL SHAFT FRACTURE (Right: Wrist)    Relevant Problems   ENDO   (+) Type 2 diabetes mellitus with diabetic neuropathy, without long-term current use of insulin (HCC)      NEURO/PSYCH   (+) Chronic pain disorder   (+) Continuous opioid dependence (HCC)   (+) Moderate recurrent major depression (HCC)   (+) Thoracic cord injury without spinal bone injury (HCC)   (+) Type 2 diabetes mellitus with diabetic neuropathy, without long-term current use of insulin (HCC)      PULMONARY   (+) Chronic respiratory failure (HCC)   (+) DEONTE (obstructive sleep apnea)        Physical Exam    Airway     Mallampati score: II  TM Distance: >3 FB  Neck ROM: full      Cardiovascular      Dental   No notable dental hx     Pulmonary      Neurological      Other Findings        Anesthesia Plan  ASA Score- 3     Anesthesia Type- general with ASA Monitors.         Additional Monitors:     Airway Plan:     Comment: Discussed option of nerve block, patient does not think he will want one, but consented for it as an option for pain control in PACU.       Plan Factors-Exercise tolerance (METS): >4 METS.    Chart reviewed.   Existing labs reviewed. Patient summary reviewed.                  Induction-     Postoperative Plan- .   Monitoring Plan - Monitoring plan - standard ASA monitoring          Informed Consent- Anesthetic plan and risks discussed with patient.  I personally reviewed this patient with the CRNA. Discussed and agreed on the Anesthesia Plan with the CRNA..      NPO Status:  Vitals Value Taken Time   Date of last liquid 06/10/25 06/11/25 12:44   Time of last liquid 2359 06/11/25 12:44   Date of last solid 06/10/25 06/11/25 12:44   Time of last solid 2100 06/11/25 12:44

## 2025-06-11 NOTE — ANESTHESIA POSTPROCEDURE EVALUATION
Post-Op Assessment Note    CV Status:  Stable  Pain Score: 0    Pain management: adequate       Mental Status:  Alert and awake   Hydration Status:  Euvolemic   PONV Controlled:  Controlled   Airway Patency:  Patent     Post Op Vitals Reviewed: Yes    No anethesia notable event occurred.    Staff: Anesthesiologist, CRNA           Last Filed PACU Vitals:  Vitals Value Taken Time   Temp 96.7    Pulse 77    /79    Resp 18    SpO2 91

## 2025-06-11 NOTE — ASSESSMENT & PLAN NOTE
Xrays obtained and reviewed; pt has radial shaft fracture with progressive shortening and worsening coronal angulation from previous films, dominant extremity  Treatment options discussed including operative and nonoperative management. Risks and benefits of each discussed in detail  Operative management was discussed in the form of right distal radius fracture ORIF. risks of surgery, including but not limited to, infection, nerve and blood vessel injury, postoperative stiffness, persistent pain, malunion, nonunion, DVT/PE, impaired limb function, loss of limb, symptomatic hardware, tendon ruptureanesthesia complications, and need for subsequent surgery were discussed in detail.  Patient understands that he is at increased risk of healing complications due to DM most recent HbA1C of 9.7 on 5/21/2025 which includeb ut are not limited to infection, wound healing complications, impaired limb function, loss of limb  He was resplinted in the office today  Advised patient he should not be driving at this time.  Plan to go to OR tomorrow for ORIF, NPO after midnight  Follow up post operatively  Orders:    Case request operating room: OPEN REDUCTION W/ INTERNAL FIXATION (ORIF) RIGHT RADIAL SHAFT FRACTURE; Standing

## 2025-06-11 NOTE — DISCHARGE INSTR - AVS FIRST PAGE
Discharge Instructions - Orthopedics  Ankush T Page 55 y.o. male MRN: [id number]  Unit/Bed#: BE OR 18    Weight Bearing Status:                                           Non-weight bearing right upper extremity in splint at all times     DVT prophylaxis:  N/A     Pain:  Continue analgesics as directed    Dressing Instructions:   Please keep clean, dry and intact until follow up.  Please keep splint dry at all times. Contact office if splint becomes wet or damaged    Appt Instructions:   If you do not have your appointment, please call the clinic at [phone]  Otherwise follow up as scheduled.    Contact the office sooner if you experience any increased numbness/tingling in the extremities.

## 2025-06-12 ENCOUNTER — TELEPHONE (OUTPATIENT)
Dept: OBGYN CLINIC | Facility: HOSPITAL | Age: 55
End: 2025-06-12

## 2025-06-12 DIAGNOSIS — S52.591A OTHER CLOSED FRACTURE OF DISTAL END OF RIGHT RADIUS, INITIAL ENCOUNTER: Primary | ICD-10-CM

## 2025-06-12 LAB
ATRIAL RATE: 84 BPM
P AXIS: 28 DEGREES
PR INTERVAL: 128 MS
QRS AXIS: -13 DEGREES
QRSD INTERVAL: 104 MS
QT INTERVAL: 390 MS
QTC INTERVAL: 461 MS
T WAVE AXIS: 33 DEGREES
VENTRICULAR RATE: 84 BPM

## 2025-06-12 PROCEDURE — 93010 ELECTROCARDIOGRAM REPORT: CPT | Performed by: INTERNAL MEDICINE

## 2025-06-12 RX ORDER — OXYCODONE HYDROCHLORIDE 5 MG/1
5 TABLET ORAL EVERY 6 HOURS PRN
Qty: 30 TABLET | Refills: 0 | Status: SHIPPED | OUTPATIENT
Start: 2025-06-12

## 2025-06-12 NOTE — OP NOTE
OPERATIVE REPORT  PATIENT NAME: Ankush Richmond    :  1970  MRN: [id number]  Pt Location:  OR ROOM 18    SURGERY DATE: 2025    Surgeons and Role:     * Enrique Butler MD - Primary     * Nelson Miranda PA-C - Assisting     * Adia Johnson MD - Assisting     * Jesse Henderson MD - Assisting    Preop Diagnosis:  Other closed fracture of shaft of right radius, initial encounter [S52.391A]    Post-Op Diagnosis Codes:     * Other closed fracture of shaft of right radius, initial encounter [S52.391A]    Procedure(s):  Right - OPEN REDUCTION W/ INTERNAL FIXATION (ORIF) RIGHT RADIAL SHAFT FRACTURE (CPT 99865)    Specimen(s):  * No specimens in log *    Estimated Blood Loss:   50 cc    Drains:  * No LDAs found *    Anesthesia Type:   general    Operative Indications:  Displaced, shortened, angulated radial shaft fracture with worsening alignment    Operative Findings:  See below     Complications:   None    Implants: Synthes 7 hl LCP eve-meta volar locking plate    Procedure and Technique:  The patient is a 55M with radial shaft fracture with no evidence of DRUJ instability/Galeazzi injury who progressed to worsening alignment of fracture with significant shortening as well as displacement in the coronal and sagittal planes. He understands he is at high risk for complications secondary to A1c 9.6% including but not limited to infection , wound healing complications, impaired limb function, loss of limb, loss of fixation, need for additional procedures, death and agrees to proceed with surgery.    The patient's operative site, laterality, procedure, consent were verified in the preop area and the patient was taken to the OR. General anesthesia was induced and a nonsterile tourniquet was applied to the operative extremity and used for less than 120 minutes. The operative extremity was prepped and draped in the usual sterile fashion. After timeout, volar Rio approach incisions was made and dissection was  carried down to the FCR sheath which was incised. Tendon was retracted and dorsal FCR sheath was incised. Care was taken to protect nv structures and radial artery. FPL was retracted and PQ was elevated from bone. Interval was followed proximally to expose the full extent of the fracture which was rather comminuted. Best length, alignment, rotation were restored and held in place with clamp and wires. Above plate was placed and balanced fixation took place with a mix of cortical and locking screws in bridging fashion. Fluoro imaging revealed satisfactory fixation and safe placement of screws. Wound was copiously irrigated with sterile saline. Closure took place with vicryl and nylon sutures. Sterile dressings were applied. The patient was placed in a well padded sugar tong splint. All final counts were correct. I was present for the entire procedure. The patient was extubated and awakened and taken to the PACU in stable condition. There were no immediate complications.    The patient will be NWB in splint. We will consider removing sutures in 2-3 wks and transition to clamshell brace.    Patient Disposition:  PACU          SIGNATURE: Enrique Butler MD  DATE: June 11, 2025  TIME: 8:50 PM

## 2025-06-12 NOTE — ANESTHESIA POSTPROCEDURE EVALUATION
Post-Op Assessment Note    CV Status:  Stable    Pain management: adequate       Hydration Status:  Stable   Airway Patency:  Patent     Post Op Vitals Reviewed: Yes    No anethesia notable event occurred.    Staff: Anesthesiologist           Last Filed PACU Vitals:  Vitals Value Taken Time   Temp 96.7 °F (35.9 °C) 06/11/25 15:38   Pulse 74 06/11/25 16:23   /64 06/11/25 16:15   Resp 36 06/11/25 16:19   SpO2 96 % 06/11/25 16:23   Vitals shown include unfiled device data.    Modified Lilliam:     Vitals Value Taken Time   Activity 2 06/11/25 15:38   Respiration 2 06/11/25 15:38   Circulation 2 06/11/25 15:38   Consciousness 1 06/11/25 15:38   Oxygen Saturation 1 06/11/25 15:38     Modified Lilliam Score: 8

## 2025-06-12 NOTE — TELEPHONE ENCOUNTER
Caller: Pt     Doctor: Dr. Butler    Reason for call: requesting PO medication to be sent to the pharm on file ([facility] [address]) [facility] to [facility] [place].     Please advise when completed     Call back#: Phone: [phone]

## 2025-06-13 ENCOUNTER — TELEPHONE (OUTPATIENT)
Age: 55
End: 2025-06-13

## 2025-06-13 NOTE — TELEPHONE ENCOUNTER
Pt said he has been on Metformin 1000mg for years. The doctor recently added an insulin pen. Pt wants to know if he is to continue the Metformin with the pen. Please, advise.

## 2025-06-20 DIAGNOSIS — S52.591A OTHER CLOSED FRACTURE OF DISTAL END OF RIGHT RADIUS, INITIAL ENCOUNTER: Primary | ICD-10-CM

## 2025-06-23 NOTE — TELEPHONE ENCOUNTER
Can you please clarify the message? Does he need insulin or pen needles?       Disregard I'll just sent both.   
Patient called the RX Refill Line. Message is being forwarded to the office.     Patient is requesting prescription for pen needles was given Lantus solosatr insulin with no pen needles     [facility]/pharmacy #[id number] - [place] -   [address] [phone]     Any questions Please contact patient at [phone]    
yes

## 2025-06-24 ENCOUNTER — APPOINTMENT (OUTPATIENT)
Dept: RADIOLOGY | Facility: CLINIC | Age: 55
End: 2025-06-24
Attending: ORTHOPAEDIC SURGERY
Payer: COMMERCIAL

## 2025-06-24 ENCOUNTER — OFFICE VISIT (OUTPATIENT)
Dept: OBGYN CLINIC | Facility: CLINIC | Age: 55
End: 2025-06-24

## 2025-06-24 VITALS — BODY MASS INDEX: 30.92 KG/M2 | HEIGHT: 70 IN | WEIGHT: 216 LBS

## 2025-06-24 DIAGNOSIS — S52.591A OTHER CLOSED FRACTURE OF DISTAL END OF RIGHT RADIUS, INITIAL ENCOUNTER: ICD-10-CM

## 2025-06-24 DIAGNOSIS — S52.391D OTHER CLOSED FRACTURE OF SHAFT OF RIGHT RADIUS WITH ROUTINE HEALING, SUBSEQUENT ENCOUNTER: Primary | ICD-10-CM

## 2025-06-24 PROCEDURE — 73110 X-RAY EXAM OF WRIST: CPT

## 2025-06-24 PROCEDURE — 99024 POSTOP FOLLOW-UP VISIT: CPT | Performed by: ORTHOPAEDIC SURGERY

## 2025-06-24 NOTE — ASSESSMENT & PLAN NOTE
Reviewed physical exam findings and x-ray findings with patient at time of visit  Sutures removed  Referral provided to OT/hand therapy to work on pain reduction, swelling reduction, intrinsic strengthening and range of motion  Continue OTC NSAIDs/Tylenol as needed for pain and soreness  Reviewed incision care with patient at time of visit  He will be seen for follow-up in 4 weeks for reevaluation and repeat x-rays, 3 views right wrist  Should any questions or concerns arise prior to that time he can contact the office  Patient expresses understanding and is in agreement with this treatment plan  Orders:    Ambulatory referral to PT/OT hand therapy; Future    Cock Up Wrist Splint

## 2025-06-24 NOTE — PROGRESS NOTES
Name: Ankush Richmond      : 1970       MRN: [id number]   Encounter Provider: Enrique Butler MD   Encounter Date: 25  Encounter department: [facility] [place]         Assessment & Plan  Other closed fracture of shaft of right radius with routine healing, subsequent encounter  Reviewed physical exam findings and x-ray findings with patient at time of visit  Sutures removed  Referral provided to OT/hand therapy to work on pain reduction, swelling reduction, intrinsic strengthening and range of motion  Continue OTC NSAIDs/Tylenol as needed for pain and soreness  Reviewed incision care with patient at time of visit  He will be seen for follow-up in 4 weeks for reevaluation and repeat x-rays, 3 views right wrist  Should any questions or concerns arise prior to that time he can contact the office  Patient expresses understanding and is in agreement with this treatment plan  Orders:    Ambulatory referral to PT/OT hand therapy; Future    Cock Up Wrist Splint       _____________________________________________________  CHIEF COMPLAINT:  Chief Complaint   Patient presents with    Right Wrist - Post-op         SUBJECTIVE:  Ankush Richmond is a 55 y.o. male who presents for initial postop visit 13 days s/p ORIF right radial shaft fracture performed 2025. Patient states that he has only mild soreness at rest. He does report continued swelling postoperatively. He denies any new onset bruising, numbness, or tingling. He states he is using OTC NSAIDs/Tylenol as needed for pain. He has been consistent with nonweightbearing restriction of the right upper extremity.    PAST MEDICAL HISTORY:  Past Medical History[1]    PAST SURGICAL HISTORY:  Past Surgical History[2]    FAMILY HISTORY:  Family History[3]    SOCIAL HISTORY:  Social History[4]    MEDICATIONS:  Current Medications[5]    ALLERGIES:  Allergies[6]    LABS:  HgA1c:   Lab Results   Component Value Date    HGBA1C 9.6 (H)  "05/21/2025     BMP:   Lab Results   Component Value Date    GLUCOSE 180 (H) 02/12/2016    CALCIUM 10.2 05/21/2025     11/03/2014    K 5.0 05/21/2025    CO2 32 05/21/2025    CL 99 05/21/2025    BUN 19 05/21/2025    CREATININE 0.95 05/21/2025     CBC: No components found for: \"CBC\"    _____________________________________________________  PHYSICAL EXAMINATION:  Vital signs: Ht 5' 10\" (1.778 m)   Wt 98 kg (216 lb)   BMI 30.99 kg/m²   General: No acute distress, awake and alert  Psychiatric: Mood and affect appear appropriate  HEENT: Trachea Midline, No torticollis, no apparent facial trauma  Cardiovascular: No audible murmurs; Extremities appear perfused  Pulmonary: No audible wheezing or stridor  Skin: No open lesions; see further details (if any) below    MUSCULOSKELETAL EXAMINATION:  Right wrist -   Incision(s): Clean, dry, healing -edges well-approximated with sutures -sutures removed at time of visit without difficulty -no signs of drainage or dehiscence  Skin is warm and dry to touch with no signs of erythema, ecchymosis, infection  Generalized soft tissue swelling extending to the fingers as expected postoperatively   No effusion  ROM: Intrinsic, extensor, and flexor mechanisms intact  Strength: Deferred  2+ distal radial pulse with brisk capillary refill to the fingers  Radial, median, and ulnar motor and sensory distributions intact  Sensation light touch intact distally      _____________________________________________________  STUDIES REVIEWED:  Attending Physician has personally reviewed pertinent imaging in PACS, impression is as follows:    Review of radiographic series taken 6/24/2025 of the right wrist shows successful reduction of radial shaft fracture. Surgical hardware to be in maintained alignment as compared to intraoperative imaging.    PROCEDURES PERFORMED:  Procedures  No new procedures    Scribe Attestation      I,:  Troy Turner am acting as a scribe while in the presence of the " attending physician.:       I,:  Enrique Butler MD personally performed the services described in this documentation    as scribed in my presence.:                 [1]   Past Medical History:  Diagnosis Date    Diabetes mellitus (HCC)     Shingles    [2]   Past Surgical History:  Procedure Laterality Date    BACK SURGERY      KNEE SURGERY Left     OH OPTX RADIAL&ULNAR SHFT FX W/INT FIXJ RADIUS&ULNA Right 6/11/2025    Procedure: OPEN REDUCTION W/ INTERNAL FIXATION (ORIF) RIGHT RADIAL SHAFT FRACTURE;  Surgeon: Enrique Butler MD;  Location: [facility];  Service: Orthopedics    SPINE SURGERY  1997    VENTRAL HERNIA REPAIR     [3]   Family History  Problem Relation Name Age of Onset    Diabetes Mother Amanda     Lung cancer Father Ankush     Skin cancer Father Ankush     Prostate cancer Father Ankush     Cancer Father Ankush     Colon cancer Neg Hx      Hypertension Neg Hx      Hyperlipidemia Neg Hx      Thyroid disease Neg Hx     [4]   Social History  Tobacco Use    Smoking status: Never     Passive exposure: Never    Smokeless tobacco: Never   Vaping Use    Vaping status: Never Used   Substance Use Topics    Alcohol use: No    Drug use: No   [5]   Current Outpatient Medications:     atorvastatin (LIPITOR) 40 mg tablet, Take 1 tablet (40 mg total) by mouth daily, Disp: 90 tablet, Rfl: 1    baclofen (LIORESAL) 10 MG/20ML, by Intrathecal route if needed, Disp: , Rfl:     baclofen 10 mg tablet, TAKE 1 TABLET BY MOUTH THREE TIMES A DAY, Disp: 270 tablet, Rfl: 1    docusate sodium (COLACE) 100 mg capsule, Take 1 capsule (100 mg total) by mouth 2 (two) times a day, Disp: 30 capsule, Rfl: 0    Insulin Glargine Solostar (Lantus SoloStar) 100 UNIT/ML SOPN, Inject 0.1 mL (10 Units total) under the skin daily at bedtime, Disp: 9 mL, Rfl: 1    meloxicam (MOBIC) 15 mg tablet, TAKE 1 TABLET BY MOUTH EVERY DAY, Disp: 30 tablet, Rfl: 2    oxyCODONE (Roxicodone) 5 immediate release tablet, Take 1 tablet (5 mg total) by mouth  every 6 (six) hours as needed for moderate pain Max Daily Amount: 20 mg, Disp: 30 tablet, Rfl: 0    sertraline (ZOLOFT) 50 mg tablet, TAKE 1 TABLET BY MOUTH EVERY DAY, Disp: 90 tablet, Rfl: 1    zolpidem (AMBIEN) 10 mg tablet, Take 1 tablet (10 mg total) by mouth daily at bedtime as needed for sleep Do not start before May 28, 2025., Disp: 30 tablet, Rfl: 0    Blood Glucose Monitoring Suppl (OneTouch Verio Flex System) w/Device KIT, CHECK BLOOD SUGARS ONCE DAILY. (Patient not taking: Reported on 6/5/2025), Disp: 1 kit, Rfl: 0    Continuous Glucose Sensor (Dexcom G7 Sensor), Use 1 Device every 10 days (Patient not taking: Reported on 6/5/2025), Disp: 9 each, Rfl: 3    glucose blood (OneTouch Verio) test strip, Check blood sugars once daily. Please substitute with appropriate alternative as covered by patient's insurance. Dx: E11.65 (Patient not taking: Reported on 6/5/2025), Disp: 100 each, Rfl: 3    Insulin Pen Needle (BD Pen Needle Tami U/F) 32G X 4 MM MISC, Use daily as directed with insulin pen (Patient not taking: Reported on 6/5/2025), Disp: 100 each, Rfl: 3    OneTouch Delica Lancets 33G MISC, Check blood sugars once daily. Please substitute with appropriate alternative as covered by patient's insurance. Dx: E11.65 (Patient not taking: Reported on 6/5/2025), Disp: 100 each, Rfl: 3  [6]   Allergies  Allergen Reactions    Gadolinium Derivatives Itching     Media Ready-Box (Contrast Dye)     Ozempic (0.25 Or 0.5 Mg-Dose) [Semaglutide(0.25 Or 0.5mg-Dos)] Headache    Trulicity [Dulaglutide] Dizziness

## 2025-06-26 DIAGNOSIS — F51.01 PRIMARY INSOMNIA: ICD-10-CM

## 2025-06-26 RX ORDER — ZOLPIDEM TARTRATE 10 MG/1
10 TABLET ORAL
Qty: 30 TABLET | Refills: 0 | Status: SHIPPED | OUTPATIENT
Start: 2025-06-26

## 2025-06-26 NOTE — TELEPHONE ENCOUNTER
Reason for call:   [x] Refill   [] Prior Auth  [] Other:     Office:   [x] PCP/Provider -   Ordering Department: [facility] [place]  Authorized By: Shanice Mcadams MD  [] Specialty/Provider -     Medication: zolpidem (AMBIEN) 10 mg tablet    Dose/Frequency: Take 1 tablet (10 mg total) by mouth daily at bedtime as needed for sleep     Quantity: 30    Pharmacy: [facility]/pharm[facility] #[id number] - [place], [place] - [address] [phone]    Local Pharmacy   Does the patient have enough for 3 days?   [] Yes   [x] No - Send as HP to POD    Mail Away Pharmacy   Does the patient have enough for 10 days?   [] Yes   [x] No - Send as HP to POD

## 2025-06-26 NOTE — TELEPHONE ENCOUNTER
Patient Id Prescription # Sold Filled Written Drug Label Qty Days Strength MME* Prescriber Pharmacy Payment REFILL #/Auth State Detail   1 [id number] ** 06/12/2025 06/12/2025 oxyCODONE HCL (Tablet) 30.0 7 5 MG 32.14 YAYA CHIN [facility] [place] / 0 [place]    1 [id number] ** 06/02/2025 06/01/2025 oxyCODONE HCL (Tablet) 12.0 3 5 MG 30.0 SKYLER ERIC [facility] [place] 0 [place]    1 [id number] ** 05/28/2025 05/22/2025 Zolpidem Tartrate (Tablet) 30.0 30 10 MG NA [facility] [place] 0 / 0 [place]    1 [id number] ** 04/30/2025 04/30/2025 Zolpidem Tartrate (Tablet) 30.0 30 10 MG NA [facility] [place] / 0 [place]    1 [id number] ** 03/25/2025 03/25/2025 Zolpidem Tartrate (Tablet) 30.0 30 10 MG NA [facility] [place] / 0 [place]    1 [id number] ** 02/20/2025 02/20/2025 Zolpidem Tartrate (Tablet) 30.0 30 10 MG NA [facility] [place] / 0 [place]    1 [id number] ** 01/23/2025 01/23/2025 Zolpidem Tartrate (Tablet) 30.0 30 10 MG NA [facility] [place] / 0 [place]    1 [id number] ** 12/23/2024 12/23/2024 Zolpidem Tartrate (Tablet) 30.0 30 10 MG NA ROBERT JAVIER POGODZINSKI [facility] [place] / 0 [place]    1 [id number] ** 11/22/2024 11/22/2024 Zolpidem Tartrate (Tablet) 30.0 30 10 MG NA ELSPETH BLACK-MIRELES [facility] [place] / [place]    1 [id number] ** 10/23/2024 10/23/2024 Zolpidem Tartrate (Tablet) 30.0 30 10 MG NA RISHABH JOAQUIN [facility] [place] 0 / 0 [place]

## 2025-07-07 NOTE — PROGRESS NOTES
OT Evaluation     Today's date: 2025  Patient name: Ankush Richmond  : 1970  MRN: [id number]  Referring provider: Enrique Butler MD  Dx:   Encounter Diagnosis     ICD-10-CM    1. Other closed fracture of shaft of right radius with routine healing, subsequent encounter  S52.391D                      Assessment  Impairments: abnormal coordination, abnormal or restricted ROM, abnormal movement, activity intolerance, impaired physical strength, lacks appropriate home exercise program, pain with function, weight-bearing intolerance, unable to perform ADL and activity limitations    Assessment details: Pt presents for OT eval on 25 with s/p R radial shaft ORIF (DOS: 25). Subjectively, Pt reports mod pain at this time at rest and instances of mod-max pain (~8/10) at worst during attempted inc function with his RUE. He has difficulty completing a majority of daily activity at this time with hi RUE, including dressing, lifting/grasping objects, opening jars, twisting doorknobs, driving and more. Objectively, pt presents with mod scarring at this time in his volar FA around incision from surgery, as well as mod inc in edema around his R FA & wrist. He reported dec sensation in all digits, but this is due to previous nerve issues stemming from his cervical spine. He reports symptoms have not gotten worse since injury. Pt does display dec RUE AROm overall compared to unaffected UE at this time, as well as dec  & pinch strength as well. Created custom wrist cock-up brace for pt to wear during any heavier activity and at night for comfort if needed. Pt also provided with compression glove for edema management. Pt EDU on HEP that includes UE AROM, digit PROM, intrinsic strengthening exercises, and theraputty HEP. Pt demo understanding of initial HEP at this time, as well as precautions from current surgery and for splint use. OT recommending 1-2 times week/ 6-12 weeks in order to attempt to dec  pain/scarring/swelling and inc ROM/strength /function of pt's RUE, which attempt to inc pt's overall activity tolerance/performance in affected daily activities and leisure tasks. Pt understands/agrees with current POC.   Understanding of Dx/Px/POC: good     Prognosis: good    Goals  Short term (within 3-4 visits of IE):  Pt will be indep with recc HEP focusing on UE AROM, digit/intrinsic PROM, theraputty and intrinsic strengthening exercises.  Pt will be EDU on appropriate activity modifications, modalities and manual techniques to dec current symptoms of pain and inc overall function.  Pt will display indep in the management and wearing schedule of their custom orthosis   Pt will display indep in appropriate edema/scar mob techniques   Pt will display tolerance for gentle RUE strengthening exercises in order to attempt to inc overall function of pt's RUE.  Long term (by time of d/c):  Pt will report dec pain (<3/10) during affected ADL, IADL & leisure tasks compared to IE to improve overall activity tolerance.  Pt will display dec edema of affected UE compared to IE (within 0.8 cm of unaffected side)  Pt will display inc AROM (>10 degree inc) of affected FA & wrist compared to IE in order to inc ability to participate in ADL & IADL tasks   Pt will display inc  (>10 lbs) & pinch (>3 lbs) strength of affected UE compared to IE in order to return to inc participation in affected ADL/IADL tasks.  Pt will display inc FOTO score of 10 or more points or more to display overall inc in functional performance compared to IE.  Pt will be agreeable to d/c from OT.     Plan  Patient would benefit from: skilled occupational therapy and custom splinting  Planned modality interventions: thermotherapy: hydrocollator packs, ultrasound and thermotherapy: paraffin bath    Planned therapy interventions: manual therapy, massage, joint mobilization, patient/caregiver education, therapeutic exercise, strengthening, stretching, graded  exercise, IASTM, nerve gliding, flexibility, therapeutic activities, graded activity, functional ROM exercises, fine motor coordination training, activity modification, home exercise program, orthotic fitting/training, orthotic management and training, kinesiology taping, IADL retraining and ADL retraining    Frequency: 1-2x week  Therapy duration (weeks): 8-12.  Plan of Care beginning date: 2025  Plan of Care expiration date: 10/31/2025  Treatment plan discussed with: patient        Subjective Evaluation    History of Present Illness  Date of surgery: 2025  Mechanism of injury: surgery  Mechanism of injury: Ankush is a 54 y/o RHD male who presents for an OT eval with s/p R radial shaft ORIF (DOS: 25; Today is 4 weeks post-op). Pt fell on driveway on top of outstretched right hand. Pt also has previous nerve issues throughout BUE due to cervical spine issues over the past 10+ years.     Occupational Profile  ADLs: difficulty carrying objects, writing, twisting motions, opening doors, jars    IADLs: difficulty with yardwork, cleaning, carrying laundry baskets, etc.    School: n/a    Driving: driving with LUE primarily    Sport/Leisure: difficulty fishing    Work: n/a     Quality of life: good    Patient Goals  Patient goals for therapy: increased strength, decreased edema, decreased pain, increased motion, independence with ADLs/IADLs and return to sport/leisure activities    Pain  Current pain ratin  At best pain ratin  At worst pain ratin  Location: dorsal & volar aspects of R wrist, radial aspect of R wrist/FA  Quality: dull ache  Relieving factors: medications, support and change in position    Treatments  Current treatment: occupational therapy        Objective    Tissue Integrity: mod scarring on volar aspect of FA, inc swelling around wrist at this time    Sensation (Ten-Test ) (Pt has previous nerve issues stemming from cervical spine affected BUE)  Right Hand (thumb to small  finger): 4/10, 5/10, 7/10, 7/10, 7/10  Left Hand (thumb to small finger): 7/10, 8/10, 5/10, 7/10, 5/10    Special Tests: n/a    Edema (circumferential) (cm):    Right Left   Mid forearm 23.8 22   Wrist crease 20.9 20.2   Palm 23.4 21.7       AROM     Elbow/Forearm   Right Left   Extension/Flexion WFL WFL   Supination 53 75   Pronation 55 75     Wrist   Right Left   Flexion 36 60   Extension 62 75   Radial Dev. 14 15   Ulnar Dev. 22 35     Right Hand (ext/flex)   D2 D3 D4 D5   MCP 0/55 0/65 0/60 0/60   PIP 0/74 0/82 0/80 0/80   DIP 0/30 0/30 0/40 0/50   Kapandji Score (Opposition) 6/10        Left Hand (ext/flex)   D2 D3 D4 D5   MCP 0/68 0/78 0/78 0/80   PIP 0/90 0/85 0/88 0/85   DIP 0/60 0/60 0/50 0/70   Kapandji Score (opposition) 7/10          Thumb   Right Left   MP  40 60   IP 12 65   Palmar Abd. 53 58   Radial Abd. 60 73     /Pinch Strength  Dynamometer R UE  L UE comments   Position #2 (lbs) 5.0 44.7     Pinch Meter          Lateral 6 8      3 JAW JEREMÍAS 3 9      2-point 2  5.5             Precautions: R Radial shaft ORIF (6/11/25)  Please evaluate and treat 4 wks s/p ORIF right distal radius fracture  Focus on pain reducton, swelling reduction, instrinsic  Use all modalities as needed  Please provide HEP  Restrictions: coffee cup WB RUE  Manuals  7/9 IE (4 weeks post-op)                 STM/IASTM                  Edema mobs              desensitization                   scar mobs                     Provided M edema glove                Ther Ex      HEP: UE AROM, digit/thumb AROM, theraputty HEP, intrinsic strengthening, digit/intrinsic stretching                 Shoulder AROM                  Elbow/FA AROM              Wrist AROM              Wrist stretches              TGEs              Thumb AROM             Wrist iso             Intrinsic strengthening             Graded strengthening             Theraband HEP             Theraputty HEP Provided Yellow                                                                Ther Activity                   FMC                  Wrist proprio                weightbearing                ADL sim                                                                   Neuro Re-Ed                                                               Ortho Fit                  Wrist cock-up                                             Modalities                  heat

## 2025-07-09 ENCOUNTER — EVALUATION (OUTPATIENT)
Dept: OCCUPATIONAL THERAPY | Age: 55
End: 2025-07-09
Attending: ORTHOPAEDIC SURGERY
Payer: COMMERCIAL

## 2025-07-09 DIAGNOSIS — S52.391D OTHER CLOSED FRACTURE OF SHAFT OF RIGHT RADIUS WITH ROUTINE HEALING, SUBSEQUENT ENCOUNTER: ICD-10-CM

## 2025-07-09 DIAGNOSIS — S52.391D OTHER CLOSED FRACTURE OF SHAFT OF RIGHT RADIUS WITH ROUTINE HEALING, SUBSEQUENT ENCOUNTER: Primary | ICD-10-CM

## 2025-07-09 PROCEDURE — 97165 OT EVAL LOW COMPLEX 30 MIN: CPT

## 2025-07-09 PROCEDURE — 97760 ORTHOTIC MGMT&TRAING 1ST ENC: CPT

## 2025-07-09 PROCEDURE — 97110 THERAPEUTIC EXERCISES: CPT

## 2025-07-15 ENCOUNTER — OFFICE VISIT (OUTPATIENT)
Dept: OCCUPATIONAL THERAPY | Age: 55
End: 2025-07-15
Attending: ORTHOPAEDIC SURGERY
Payer: COMMERCIAL

## 2025-07-15 DIAGNOSIS — S52.391D OTHER CLOSED FRACTURE OF SHAFT OF RIGHT RADIUS WITH ROUTINE HEALING, SUBSEQUENT ENCOUNTER: Primary | ICD-10-CM

## 2025-07-15 PROCEDURE — 97140 MANUAL THERAPY 1/> REGIONS: CPT

## 2025-07-15 PROCEDURE — 97110 THERAPEUTIC EXERCISES: CPT

## 2025-07-16 NOTE — PROGRESS NOTES
"Daily Note     Today's date: 2025  Patient name: Ankush Richmond  : 1970  MRN: [id number]  Referring provider: Enrique Butler MD  Dx:   Encounter Diagnosis     ICD-10-CM    1. Other closed fracture of shaft of right radius with routine healing, subsequent encounter  S52.391D           Start Time: 915  Stop Time: 1000  Total time in clinic (min): 45 minutes    Subjective: \"I think it is feeling pretty good altogether.\"      Objective: See treatment diary below      Assessment: Tolerated treatment well. Cont EDU on current precautions for pt at this time (ex. [place] weightbearing only at this time). EDU pt to attempt to use LUE only when attempting to stand up for assistance at this time. Pt cont to display inc ROM overall in his R F & wrist. Pt's scarring cont to dec as well. Pt tolerated intrinsic and  strengthening exercises well today also. Patient would benefit from continued OT      Plan: Continue per plan of care.  Progress treatment as tolerated.       Precautions: R Radial shaft ORIF (25)  Please evaluate and treat 4 wks s/p ORIF right distal radius fracture  Focus on pain reducton, swelling reduction, instrinsic  Use all modalities as needed  Please provide HEP  Restrictions: coffee cup WB RUE  Manuals   IE (4 weeks post-op)  7/15 (4+ weeks post-op)   (5+ weeks post-op)             STM/IASTM                  Edema mobs  10' 10'           desensitization                   scar mobs    5'  5'               Provided M edema glove                Ther Ex      HEP: UE AROM, digit/thumb AROM, theraputty HEP, intrinsic strengthening, digit/intrinsic stretching                 Shoulder AROM    x10 shoulder flexion; shoulder abduction    3x15 secs shoulder horizontal add stretch  x10 shoulder flexion; shoulder abduction    3x15 secs shoulder horizontal add stretch            Elbow/FA AROM  X15 elbow flexion/ extension    X20 reps FA pron/sup (w/ 5 sec holds at end-range) x15 elbow " flexion/ extension    X20 reps FA pron/sup (w/ 5 sec holds at end-range)           Wrist AROM  x20 reps wrist flex/ext (w/ 5 sec holds at end-range)    X20 rad/uln deviation x20 reps wrist flex/ext (w/ 5 sec holds at end-range)    X20 rad/uln deviation           Wrist stretches  4x20 secs gentle wrist flex stretch 4x20 secs gentle wrist flex stretch    4x20 secs gentle wrist extension stretch           Digit PROM  Digit comp flexion stretch 3x20 secs           TGEs              Thumb AROM             Wrist iso             Intrinsic strengthening/ stretches  4x15 secs intrinsic stretch    Blue foam digit add x15 b/w each set of fingers 4x15 secs intrinsic stretch    Blue foam digit add x15 b/w each set of fingers          Graded strengthening  G power web x15 center ; x15 side  G  power web x15 center ; x15 side     G digiflex x15 all digits          Theraband HEP             Theraputty HEP Provided Yellow                                                               Ther Activity                   FMC                  Wrist proprio                weightbearing                ADL sim                                                                   Neuro Re-Ed                                                               Ortho Fit                  Wrist cock-up 20'                                            Modalities                  heat    5'  5'

## 2025-07-17 ENCOUNTER — APPOINTMENT (OUTPATIENT)
Dept: OCCUPATIONAL THERAPY | Age: 55
End: 2025-07-17
Attending: ORTHOPAEDIC SURGERY
Payer: COMMERCIAL

## 2025-07-18 ENCOUNTER — OFFICE VISIT (OUTPATIENT)
Dept: OCCUPATIONAL THERAPY | Age: 55
End: 2025-07-18
Attending: ORTHOPAEDIC SURGERY
Payer: COMMERCIAL

## 2025-07-18 ENCOUNTER — OFFICE VISIT (OUTPATIENT)
Dept: FAMILY MEDICINE CLINIC | Facility: CLINIC | Age: 55
End: 2025-07-18
Payer: COMMERCIAL

## 2025-07-18 VITALS
DIASTOLIC BLOOD PRESSURE: 78 MMHG | OXYGEN SATURATION: 94 % | HEIGHT: 70 IN | SYSTOLIC BLOOD PRESSURE: 122 MMHG | TEMPERATURE: 97.9 F | HEART RATE: 96 BPM | BODY MASS INDEX: 31.04 KG/M2 | WEIGHT: 216.8 LBS

## 2025-07-18 DIAGNOSIS — S52.391D OTHER CLOSED FRACTURE OF SHAFT OF RIGHT RADIUS WITH ROUTINE HEALING, SUBSEQUENT ENCOUNTER: Primary | ICD-10-CM

## 2025-07-18 DIAGNOSIS — B35.4 TINEA CORPORIS: Primary | ICD-10-CM

## 2025-07-18 PROCEDURE — 99213 OFFICE O/P EST LOW 20 MIN: CPT | Performed by: FAMILY MEDICINE

## 2025-07-18 PROCEDURE — G2211 COMPLEX E/M VISIT ADD ON: HCPCS | Performed by: FAMILY MEDICINE

## 2025-07-18 PROCEDURE — 97110 THERAPEUTIC EXERCISES: CPT

## 2025-07-18 RX ORDER — KETOCONAZOLE 20 MG/G
CREAM TOPICAL 2 TIMES DAILY
Qty: 60 G | Refills: 3 | Status: SHIPPED | OUTPATIENT
Start: 2025-07-18

## 2025-07-21 ENCOUNTER — OFFICE VISIT (OUTPATIENT)
Dept: OCCUPATIONAL THERAPY | Age: 55
End: 2025-07-21
Attending: ORTHOPAEDIC SURGERY
Payer: COMMERCIAL

## 2025-07-21 DIAGNOSIS — S52.391D OTHER CLOSED FRACTURE OF SHAFT OF RIGHT RADIUS WITH ROUTINE HEALING, SUBSEQUENT ENCOUNTER: Primary | ICD-10-CM

## 2025-07-21 DIAGNOSIS — E11.65 TYPE 2 DIABETES MELLITUS WITH HYPERGLYCEMIA, WITHOUT LONG-TERM CURRENT USE OF INSULIN (HCC): ICD-10-CM

## 2025-07-21 PROCEDURE — 97140 MANUAL THERAPY 1/> REGIONS: CPT

## 2025-07-21 PROCEDURE — 97110 THERAPEUTIC EXERCISES: CPT

## 2025-07-21 RX ORDER — INSULIN GLARGINE 100 [IU]/ML
0.1 INJECTION, SOLUTION SUBCUTANEOUS
Qty: 45 ML | Refills: 1 | Status: SHIPPED | OUTPATIENT
Start: 2025-07-21

## 2025-07-21 NOTE — PROGRESS NOTES
"Daily Note     Today's date: 2025  Patient name: Ankush Richmond  : 1970  MRN: [id number]  Referring provider: Enrique Butler MD  Dx:   Encounter Diagnosis     ICD-10-CM    1. Other closed fracture of shaft of right radius with routine healing, subsequent encounter  S52.391D           Start Time: 1400  Stop Time: 1445  Total time in clinic (min): 45 minutes    Subjective: \"Overall it has been going well, the wrist is still pretty stiff though.\"      Objective: See treatment diary below      Assessment: Tolerated treatment well. Pt reports that overall his RUE is doing well and that he has been abiding to precautions at this time (ex.weightbearing precautions). Cont to encourage edema and scar mobs regularly, as well as frequent A/PROM for his FA & wrist. Will begin to initiate strengthening for pt's R wrist soon within next couple of appointments. Pt cont to display good R wrist ROM overall at this time. Patient would benefit from continued OT      Plan: Continue per plan of care.  Progress treatment as tolerated.       Precautions: R Radial shaft ORIF (25)  Please evaluate and treat 4 wks s/p ORIF right distal radius fracture  Focus on pain reducton, swelling reduction, instrinsic  Use all modalities as needed  Please provide HEP  Restrictions: coffee cup WB RUE  Manuals   IE (4 weeks post-op)  7/15 (4+ weeks post-op)   (5+ weeks post-op)   (5+ weeks post-op)           STM/IASTM                  Edema mobs  10' 10' 10'          desensitization                   scar mobs    5'  5'  5'             Provided M edema glove                Ther Ex      HEP: UE AROM, digit/thumb AROM, theraputty HEP, intrinsic strengthening, digit/intrinsic stretching                 Shoulder AROM    x10 shoulder flexion; shoulder abduction    3x15 secs shoulder horizontal add stretch  x10 shoulder flexion; shoulder abduction    3x15 secs shoulder horizontal add stretch  x10 shoulder flexion; shoulder " abduction    3x20 secs shoulder horizontal add stretch          Elbow/FA AROM  X15 elbow flexion/ extension    X20 reps FA pron/sup (w/ 5 sec holds at end-range) x15 elbow flexion/ extension    X20 reps FA pron/sup (w/ 5 sec holds at end-range) 15 elbow flexion/ extension    X20 reps FA pron/sup (w/ 5 sec holds at end-range)          Wrist AROM  x20 reps wrist flex/ext (w/ 5 sec holds at end-range)    X20 rad/uln deviation x20 reps wrist flex/ext (w/ 5 sec holds at end-range)    X20 rad/uln deviation x20 reps wrist flex/ext (w/ 5 sec holds at end-range)    X20 rad/uln deviation          Wrist stretches  4x20 secs gentle wrist flex stretch 4x20 secs gentle wrist flex stretch    4x20 secs gentle wrist extension stretch 4x20 secs gentle wrist flex stretch    4x20 secs gentle wrist extension stretch          Digit PROM  Digit comp flexion stretch 3x20 secs  Digit comp flexion stretch 3x20 secs         TGEs              Thumb AROM             Wrist iso             Intrinsic strengthening/ stretches  4x15 secs intrinsic stretch    Blue foam digit add x15 b/w each set of fingers 4x15 secs intrinsic stretch    Blue foam digit add x15 b/w each set of fingers 4x15 secs intrinsic stretch    Blue foam digit add x15 b/w each set of fingers         Graded strengthening  G power web x15 center ; x15 side  G power web x15 center ; x15 side     G digiflex x15 all digits G power web x15 center ; x15 side          Theraband HEP             Theraputty HEP Provided Yellow                                                               Ther Activity                   FMC                  Wrist proprio                weightbearing                ADL sim                                                                   Neuro Re-Ed                                                               Ortho Fit                  Wrist cock-up 20'                                            Modalities                  heat     5'  5'  5'

## 2025-07-22 ENCOUNTER — APPOINTMENT (OUTPATIENT)
Dept: RADIOLOGY | Facility: CLINIC | Age: 55
End: 2025-07-22
Attending: ORTHOPAEDIC SURGERY
Payer: COMMERCIAL

## 2025-07-22 VITALS — BODY MASS INDEX: 30.92 KG/M2 | HEIGHT: 70 IN | WEIGHT: 216 LBS

## 2025-07-22 DIAGNOSIS — S52.391D OTHER CLOSED FRACTURE OF SHAFT OF RIGHT RADIUS WITH ROUTINE HEALING, SUBSEQUENT ENCOUNTER: Primary | ICD-10-CM

## 2025-07-22 DIAGNOSIS — S52.391D OTHER CLOSED FRACTURE OF SHAFT OF RIGHT RADIUS WITH ROUTINE HEALING, SUBSEQUENT ENCOUNTER: ICD-10-CM

## 2025-07-22 PROCEDURE — 73110 X-RAY EXAM OF WRIST: CPT

## 2025-07-22 PROCEDURE — 99024 POSTOP FOLLOW-UP VISIT: CPT | Performed by: ORTHOPAEDIC SURGERY

## 2025-07-22 NOTE — PROGRESS NOTES
"Daily Note     Today's date: 2025  Patient name: Ankush Richmond  : 1970  MRN: [id number]  Referring provider: Enrique Butler MD  Dx:   Encounter Diagnosis     ICD-10-CM    1. Other closed fracture of shaft of right radius with routine healing, subsequent encounter  S52.391D           Start Time: 1445  Stop Time: 1530  Total time in clinic (min): 45 minutes    Subjective: \"I can start to put some more weight on it.\"      Objective: See treatment diary below      Assessment: Tolerated treatment well. Pt had f/u with ortho dr, which reported routine healing of R radius and pt can now progress to strengthening and WBAT with his RUE. Pt was able to participated in RUE strengthening exercises today well, req min rest breaks to manage fatigue. Pt was provided with EITAN fitzgerald for home to perform wrist curls for HEP.  Patient would benefit from continued OT      Plan: Continue per plan of care.  Progress treatment as tolerated.       Precautions: R Radial shaft ORIF (25)  Please evaluate and treat 4 wks s/p ORIF right distal radius fracture  Focus on pain reducton, swelling reduction, instrinsic  Use all modalities as needed  Please provide HEP  Restrictions: coffee cup WB RUE  Manuals   IE (4 weeks post-op)  7/15 (4+ weeks post-op)   (5+ weeks post-op)   (5+ weeks post-op)   (6 weeks post-op)         STM/IASTM                  Edema mobs  10' 10' 10' 7'         desensitization                   scar mobs    5'  5'  5'  5'           Provided M edema glove                Ther Ex      HEP: UE AROM, digit/thumb AROM, theraputty HEP, intrinsic strengthening, digit/intrinsic stretching                 Shoulder AROM    x10 shoulder flexion; shoulder abduction    3x15 secs shoulder horizontal add stretch  x10 shoulder flexion; shoulder abduction    3x15 secs shoulder horizontal add stretch  x10 shoulder flexion; shoulder abduction    3x20 secs shoulder horizontal add stretch          Elbow/FA " AROM  X15 elbow flexion/ extension    X20 reps FA pron/sup (w/ 5 sec holds at end-range) x15 elbow flexion/ extension    X20 reps FA pron/sup (w/ 5 sec holds at end-range) 15 elbow flexion/ extension    X20 reps FA pron/sup (w/ 5 sec holds at end-range)  x20 reps FA pron/sup (w/ 5 sec holds at end-range)        Wrist AROM  x20 reps wrist flex/ext (w/ 5 sec holds at end-range)    X20 rad/uln deviation x20 reps wrist flex/ext (w/ 5 sec holds at end-range)    X20 rad/uln deviation x20 reps wrist flex/ext (w/ 5 sec holds at end-range)    X20 rad/uln deviation  x20 reps wrist flex/ext (w/ 5 sec holds at end-range)    X20 rad/uln deviation        Wrist stretches  4x20 secs gentle wrist flex stretch 4x20 secs gentle wrist flex stretch    4x20 secs gentle wrist extension stretch 4x20 secs gentle wrist flex stretch    4x20 secs gentle wrist extension stretch  4x20 secs gentle wrist flex stretch    4x20 secs gentle wrist extension stretch        Digit PROM  Digit comp flexion stretch 3x20 secs  Digit comp flexion stretch 3x20 secs Digit comp flexion stretch 3x20 secs        TGEs              Thumb AROM             Wrist iso             Intrinsic strengthening/ stretches  4x15 secs intrinsic stretch    Blue foam digit add x15 b/w each set of fingers 4x15 secs intrinsic stretch    Blue foam digit add x15 b/w each set of fingers 4x15 secs intrinsic stretch    Blue foam digit add x15 b/w each set of fingers 4x15 secs intrinsic stretch        Graded strengthening  G power web x15 center ; x15 side  G power web x15 center ; x15 side     G digiflex x15 all digits G power web x15 center ; x15 side  R flex bar bends down x15 (3 sec holds)    Y flex bar bends up (3 sec holds)    G theraband wrist curls x15 flex; x15 ext        Theraband HEP             Theraputty HEP Provided Yellow                                                               Ther Activity                   FMC                  Wrist  proprio                weightbearing                ADL sim                                                                   Neuro Re-Ed                                                               Ortho Fit                  Wrist cock-up 20'                                            Modalities                  heat    5'  5'  5'  5'

## 2025-07-22 NOTE — ASSESSMENT & PLAN NOTE
6 weeks s/p ORIF right radial shaft fracture performed on 6/11/2025  Xrays obtained and reviewed  Progress to WBAT  Continue outpatient occupational therapy and home exercise plan  Ice/OTC analgesics as needed for symptom management  Follow up 6 weeks

## 2025-07-22 NOTE — PROGRESS NOTES
Name: Ankush Richmond      : 1970       MRN: [id number]   Encounter Provider: Enrique Butler MD   Encounter Date: 25  Encounter department: [facility] [place]         Assessment & Plan  Other closed fracture of shaft of right radius with routine healing, subsequent encounter  6 weeks s/p ORIF right radial shaft fracture performed on 2025  Xrays obtained and reviewed  Progress to WBAT  Continue outpatient occupational therapy and home exercise plan  Ice/OTC analgesics as needed for symptom management  Follow up 6 weeks            To Do Next Visit:  Xray right wrist    _____________________________________________________  CHIEF COMPLAINT:  Chief Complaint   Patient presents with    Right Wrist - Follow-up, Post-op     Surgery 25. Updated XR today. He states the wrist is feeling pretty good overall. He's currently doing outpatient PT.          SUBJECTIVE:  Ankush Richmond is a 55 y.o. male who presents 6 weeks s/p ORIF right radial shaft fracture performed on 2025. The patient is doing well overall. He is not complaining of any wrist pain. The patient has been completing outpatient occupational therapy as directed and admits this has been helping his motion. He offers no other complaints or concerns today.     PAST MEDICAL HISTORY:  Past Medical History[1]    PAST SURGICAL HISTORY:  Past Surgical History[2]    FAMILY HISTORY:  Family History[3]    SOCIAL HISTORY:  Social History[4]    MEDICATIONS:  Current Medications[5]    ALLERGIES:  Allergies[6]    LABS:  HgA1c:   Lab Results   Component Value Date    HGBA1C 9.6 (H) 2025     BMP:   Lab Results   Component Value Date    GLUCOSE 180 (H) 2016    CALCIUM 10.2 2025     2014    K 5.0 2025    CO2 32 2025    CL 99 2025    BUN 19 2025    CREATININE 0.95 2025     CBC: No components found for:  "\"CBC\"    _____________________________________________________  PHYSICAL EXAMINATION:  Vital signs: Ht 5' 10\" (1.778 m)   Wt 98 kg (216 lb)   BMI 30.99 kg/m²   General: No acute distress, awake and alert  Psychiatric: Mood and affect appear appropriate  HEENT: Trachea Midline, No torticollis, no apparent facial trauma  Cardiovascular: No audible murmurs; Extremities appear perfused  Pulmonary: No audible wheezing or stridor  Skin: No open lesions; see further details (if any) below    MUSCULOSKELETAL EXAMINATION:  Extremities:    Right wrist  No tenderness to palpation.   Range of motion of the right wrist  is near full extension, supination, pronation, decrease in wrist flexion  There is mild swelling present.   There is no ecchymosis noted.    Pulses are present and capillary fill is normal.   Incision well healing        _____________________________________________________  STUDIES REVIEWED:  I personally reviewed the images obtained in office today and my independent interpretation is as follows:  Xrays of the right wrist obtained 7/22/2025 demonstrates orthopedic hardware intact without evidence of loosening or failure. Interval healing noted.      PROCEDURES PERFORMED:  Procedures      Scribe Attestation      I,:  Na Romo am acting as a scribe while in the presence of the attending physician.:       I,:  Enrique Butler MD personally performed the services described in this documentation    as scribed in my presence.:                  [1]   Past Medical History:  Diagnosis Date    Diabetes mellitus (HCC)     Shingles    [2]   Past Surgical History:  Procedure Laterality Date    BACK SURGERY      KNEE SURGERY Left 2007    NECK SURGERY  1997    CO OPTX RADIAL&ULNAR SHFT FX W/INT FIXJ RADIUS&ULNA Right 06/11/2025    Procedure: OPEN REDUCTION W/ INTERNAL FIXATION (ORIF) RIGHT RADIAL SHAFT FRACTURE;  Surgeon: Enrique Butler MD;  Location: [facility];  Service: Orthopedics    SPINE SURGERY  1997 " VENTRAL HERNIA REPAIR     [3]   Family History  Problem Relation Name Age of Onset    Diabetes Mother Amanda     Lung cancer Father Ankush     Skin cancer Father Ankush     Prostate cancer Father Ankush     Cancer Father Ankush     Colon cancer Neg Hx      Hypertension Neg Hx      Hyperlipidemia Neg Hx      Thyroid disease Neg Hx     [4]   Social History  Tobacco Use    Smoking status: Never     Passive exposure: Never    Smokeless tobacco: Never   Vaping Use    Vaping status: Never Used   Substance Use Topics    Alcohol use: No    Drug use: No   [5]   Current Outpatient Medications:     atorvastatin (LIPITOR) 40 mg tablet, Take 1 tablet (40 mg total) by mouth daily, Disp: 90 tablet, Rfl: 1    baclofen (LIORESAL) 10 MG/20ML, by Intrathecal route if needed, Disp: , Rfl:     baclofen 10 mg tablet, TAKE 1 TABLET BY MOUTH THREE TIMES A DAY, Disp: 270 tablet, Rfl: 1    Continuous Glucose Sensor (Dexcom G7 Sensor), Use 1 Device every 10 days, Disp: 9 each, Rfl: 3    docusate sodium (COLACE) 100 mg capsule, Take 1 capsule (100 mg total) by mouth 2 (two) times a day, Disp: 30 capsule, Rfl: 0    Insulin Glargine Solostar (Lantus SoloStar) 100 UNIT/ML SOPN, INJECT 0.1 ML (10 UNITS TOTAL) UNDER THE SKIN DAILY AT BEDTIME, Disp: 45 mL, Rfl: 1    Insulin Pen Needle (BD Pen Needle Tami U/F) 32G X 4 MM MISC, Use daily as directed with insulin pen, Disp: 100 each, Rfl: 3    ketoconazole (NIZORAL) 2 % cream, Apply topically 2 (two) times a day, Disp: 60 g, Rfl: 3    meloxicam (MOBIC) 15 mg tablet, TAKE 1 TABLET BY MOUTH EVERY DAY, Disp: 30 tablet, Rfl: 2    oxyCODONE (Roxicodone) 5 immediate release tablet, Take 1 tablet (5 mg total) by mouth every 6 (six) hours as needed for moderate pain Max Daily Amount: 20 mg, Disp: 30 tablet, Rfl: 0    sertraline (ZOLOFT) 50 mg tablet, TAKE 1 TABLET BY MOUTH EVERY DAY, Disp: 90 tablet, Rfl: 1    zolpidem (AMBIEN) 10 mg tablet, Take 1 tablet (10 mg total) by mouth daily at bedtime as needed for  sleep, Disp: 30 tablet, Rfl: 0    Blood Glucose Monitoring Suppl (OneTouch Verio Flex System) w/Device KIT, CHECK BLOOD SUGARS ONCE DAILY. (Patient not taking: Reported on 6/5/2025), Disp: 1 kit, Rfl: 0  [6]   Allergies  Allergen Reactions    Gadolinium Derivatives Itching     Media Ready-Box (Contrast Dye)     Ozempic (0.25 Or 0.5 Mg-Dose) [Semaglutide(0.25 Or 0.5mg-Dos)] Headache    Trulicity [Dulaglutide] Dizziness

## 2025-07-23 ENCOUNTER — OFFICE VISIT (OUTPATIENT)
Dept: OCCUPATIONAL THERAPY | Age: 55
End: 2025-07-23
Attending: ORTHOPAEDIC SURGERY
Payer: COMMERCIAL

## 2025-07-23 DIAGNOSIS — S52.391D OTHER CLOSED FRACTURE OF SHAFT OF RIGHT RADIUS WITH ROUTINE HEALING, SUBSEQUENT ENCOUNTER: Primary | ICD-10-CM

## 2025-07-23 PROCEDURE — 97110 THERAPEUTIC EXERCISES: CPT

## 2025-07-23 PROCEDURE — 97140 MANUAL THERAPY 1/> REGIONS: CPT

## 2025-07-28 ENCOUNTER — OFFICE VISIT (OUTPATIENT)
Dept: OCCUPATIONAL THERAPY | Age: 55
End: 2025-07-28
Attending: ORTHOPAEDIC SURGERY
Payer: COMMERCIAL

## 2025-07-28 DIAGNOSIS — S52.391D OTHER CLOSED FRACTURE OF SHAFT OF RIGHT RADIUS WITH ROUTINE HEALING, SUBSEQUENT ENCOUNTER: Primary | ICD-10-CM

## 2025-07-28 DIAGNOSIS — F51.01 PRIMARY INSOMNIA: ICD-10-CM

## 2025-07-28 PROCEDURE — 97140 MANUAL THERAPY 1/> REGIONS: CPT

## 2025-07-28 PROCEDURE — 97110 THERAPEUTIC EXERCISES: CPT

## 2025-07-28 RX ORDER — ZOLPIDEM TARTRATE 10 MG/1
10 TABLET ORAL
Qty: 30 TABLET | Refills: 0 | Status: SHIPPED | OUTPATIENT
Start: 2025-07-28

## 2025-07-31 ENCOUNTER — OFFICE VISIT (OUTPATIENT)
Dept: OCCUPATIONAL THERAPY | Age: 55
End: 2025-07-31
Attending: ORTHOPAEDIC SURGERY
Payer: COMMERCIAL

## 2025-07-31 DIAGNOSIS — S52.391D OTHER CLOSED FRACTURE OF SHAFT OF RIGHT RADIUS WITH ROUTINE HEALING, SUBSEQUENT ENCOUNTER: Primary | ICD-10-CM

## 2025-07-31 PROCEDURE — 97110 THERAPEUTIC EXERCISES: CPT

## 2025-07-31 PROCEDURE — 97140 MANUAL THERAPY 1/> REGIONS: CPT

## 2025-08-01 ENCOUNTER — OFFICE VISIT (OUTPATIENT)
Dept: FAMILY MEDICINE CLINIC | Facility: CLINIC | Age: 55
End: 2025-08-01
Payer: COMMERCIAL

## 2025-08-11 ENCOUNTER — OFFICE VISIT (OUTPATIENT)
Dept: OCCUPATIONAL THERAPY | Age: 55
End: 2025-08-11
Attending: ORTHOPAEDIC SURGERY
Payer: COMMERCIAL

## 2025-08-14 ENCOUNTER — OFFICE VISIT (OUTPATIENT)
Dept: OCCUPATIONAL THERAPY | Age: 55
End: 2025-08-14
Attending: ORTHOPAEDIC SURGERY
Payer: COMMERCIAL

## 2025-08-18 ENCOUNTER — OFFICE VISIT (OUTPATIENT)
Dept: OCCUPATIONAL THERAPY | Age: 55
End: 2025-08-18
Attending: ORTHOPAEDIC SURGERY
Payer: COMMERCIAL

## 2025-08-18 DIAGNOSIS — S52.391D OTHER CLOSED FRACTURE OF SHAFT OF RIGHT RADIUS WITH ROUTINE HEALING, SUBSEQUENT ENCOUNTER: Primary | ICD-10-CM

## 2025-08-18 PROCEDURE — 97110 THERAPEUTIC EXERCISES: CPT

## 2025-08-18 PROCEDURE — 97168 OT RE-EVAL EST PLAN CARE: CPT

## 2025-08-21 ENCOUNTER — OFFICE VISIT (OUTPATIENT)
Dept: OCCUPATIONAL THERAPY | Age: 55
End: 2025-08-21
Attending: ORTHOPAEDIC SURGERY
Payer: COMMERCIAL

## 2025-08-21 DIAGNOSIS — S52.391D OTHER CLOSED FRACTURE OF SHAFT OF RIGHT RADIUS WITH ROUTINE HEALING, SUBSEQUENT ENCOUNTER: Primary | ICD-10-CM

## 2025-08-21 PROCEDURE — 97110 THERAPEUTIC EXERCISES: CPT

## 2025-08-21 PROCEDURE — 97140 MANUAL THERAPY 1/> REGIONS: CPT

## (undated) DEVICE — GLOVE INDICATOR PI UNDERGLOVE SZ 8 BLUE

## (undated) DEVICE — IMPERVIOUS STOCKINETTE: Brand: DEROYAL

## (undated) DEVICE — ANTIBACTERIAL UNDYED BRAIDED (POLYGLACTIN 910), SYNTHETIC ABSORBABLE SUTURE: Brand: COATED VICRYL

## (undated) DEVICE — GLOVE SRG BIOGEL ORTHOPEDIC 8

## (undated) DEVICE — NEEDLE 25G X 1 1/2

## (undated) DEVICE — ACE WRAP 4 IN UNSTERILE

## (undated) DEVICE — INTENDED FOR TISSUE SEPARATION, AND OTHER PROCEDURES THAT REQUIRE A SHARP SURGICAL BLADE TO PUNCTURE OR CUT.: Brand: BARD-PARKER SAFETY BLADES SIZE 15, STERILE

## (undated) DEVICE — SPONGE SCRUB 4 PCT CHLORHEXIDINE

## (undated) DEVICE — PENCILETTE PUSH BUTTON COATED

## (undated) DEVICE — STERILE BETHLEHEM PLASTIC HAND: Brand: CARDINAL HEALTH

## (undated) DEVICE — CHLORAPREP HI-LITE 26ML ORANGE

## (undated) DEVICE — INSULATED BLADE ELECTRODE: Brand: EDGE

## (undated) DEVICE — 2.0MM KIRSCHNER WIRE W/TROCAR POINT 150MM
Type: IMPLANTABLE DEVICE | Site: WRIST | Status: NON-FUNCTIONAL
Removed: 2025-06-11

## (undated) DEVICE — 1.8MM DRILL BIT WITH DEPTH MARK/QC/110MM

## (undated) DEVICE — SUT ETHILON 3-0 FS-1 18 IN 663G

## (undated) DEVICE — REM POLYHESIVE ADULT PATIENT RETURN ELECTRODE: Brand: VALLEYLAB

## (undated) DEVICE — INTENDED FOR TISSUE SEPARATION, AND OTHER PROCEDURES THAT REQUIRE A SHARP SURGICAL BLADE TO PUNCTURE OR CUT.: Brand: BARD-PARKER SAFETY BLADES SIZE 10, STERILE

## (undated) DEVICE — CURITY NON-ADHERENT STRIPS: Brand: CURITY

## (undated) DEVICE — 3M™ IOBAN™ 2 ANTIMICROBIAL INCISE DRAPE 6640EZ: Brand: IOBAN™ 2

## (undated) DEVICE — C-ARM: Brand: UNBRANDED

## (undated) DEVICE — 1.6MM KIRSCHNER WIRE WITH 5MM THREAD-TROCAR POINT 150MM
Type: IMPLANTABLE DEVICE | Site: WRIST | Status: NON-FUNCTIONAL
Removed: 2025-06-11

## (undated) DEVICE — 2.5MM DRILL BIT/QC/GOLD/110MM

## (undated) DEVICE — TUBING SUCTION 5MM X 12 FT

## (undated) DEVICE — VIOLET BRAIDED (POLYGLACTIN 910), SYNTHETIC ABSORBABLE SUTURE: Brand: COATED VICRYL

## (undated) DEVICE — PADDING CAST 4 IN  COTTON STRL